# Patient Record
Sex: MALE | Race: WHITE | NOT HISPANIC OR LATINO | URBAN - METROPOLITAN AREA
[De-identification: names, ages, dates, MRNs, and addresses within clinical notes are randomized per-mention and may not be internally consistent; named-entity substitution may affect disease eponyms.]

---

## 2017-04-26 ENCOUNTER — OUTPATIENT (OUTPATIENT)
Dept: OUTPATIENT SERVICES | Facility: HOSPITAL | Age: 47
LOS: 1 days | Discharge: HOME | End: 2017-04-26

## 2017-06-28 DIAGNOSIS — B17.0: ICD-10-CM

## 2017-06-28 DIAGNOSIS — B20 HUMAN IMMUNODEFICIENCY VIRUS [HIV] DISEASE: ICD-10-CM

## 2017-10-25 ENCOUNTER — OUTPATIENT (OUTPATIENT)
Dept: OUTPATIENT SERVICES | Facility: HOSPITAL | Age: 47
LOS: 1 days | Discharge: HOME | End: 2017-10-25

## 2017-10-25 DIAGNOSIS — K76.6 PORTAL HYPERTENSION: ICD-10-CM

## 2017-10-25 DIAGNOSIS — F17.200 NICOTINE DEPENDENCE, UNSPECIFIED, UNCOMPLICATED: ICD-10-CM

## 2017-10-25 DIAGNOSIS — R76.11 NONSPECIFIC REACTION TO TUBERCULIN SKIN TEST WITHOUT ACTIVE TUBERCULOSIS: ICD-10-CM

## 2017-10-25 DIAGNOSIS — F10.20 ALCOHOL DEPENDENCE, UNCOMPLICATED: ICD-10-CM

## 2017-10-25 DIAGNOSIS — B19.20 UNSPECIFIED VIRAL HEPATITIS C WITHOUT HEPATIC COMA: ICD-10-CM

## 2017-10-25 DIAGNOSIS — F10.231 ALCOHOL DEPENDENCE WITH WITHDRAWAL DELIRIUM: ICD-10-CM

## 2017-10-25 DIAGNOSIS — K72.90 HEPATIC FAILURE, UNSPECIFIED WITHOUT COMA: ICD-10-CM

## 2017-10-25 DIAGNOSIS — Z21 ASYMPTOMATIC HUMAN IMMUNODEFICIENCY VIRUS [HIV] INFECTION STATUS: ICD-10-CM

## 2017-10-25 DIAGNOSIS — F11.20 OPIOID DEPENDENCE, UNCOMPLICATED: ICD-10-CM

## 2017-10-26 DIAGNOSIS — B20 HUMAN IMMUNODEFICIENCY VIRUS [HIV] DISEASE: ICD-10-CM

## 2017-12-04 PROBLEM — Z00.00 ENCOUNTER FOR PREVENTIVE HEALTH EXAMINATION: Status: ACTIVE | Noted: 2017-12-04

## 2017-12-18 ENCOUNTER — INPATIENT (INPATIENT)
Facility: HOSPITAL | Age: 47
LOS: 6 days | Discharge: HOME | End: 2017-12-25
Attending: SPECIALIST | Admitting: SPECIALIST

## 2017-12-18 DIAGNOSIS — B19.20 UNSPECIFIED VIRAL HEPATITIS C WITHOUT HEPATIC COMA: ICD-10-CM

## 2017-12-18 DIAGNOSIS — R76.11 NONSPECIFIC REACTION TO TUBERCULIN SKIN TEST WITHOUT ACTIVE TUBERCULOSIS: ICD-10-CM

## 2017-12-18 DIAGNOSIS — F11.20 OPIOID DEPENDENCE, UNCOMPLICATED: ICD-10-CM

## 2017-12-18 DIAGNOSIS — F10.20 ALCOHOL DEPENDENCE, UNCOMPLICATED: ICD-10-CM

## 2017-12-18 DIAGNOSIS — F10.231 ALCOHOL DEPENDENCE WITH WITHDRAWAL DELIRIUM: ICD-10-CM

## 2017-12-18 DIAGNOSIS — Z21 ASYMPTOMATIC HUMAN IMMUNODEFICIENCY VIRUS [HIV] INFECTION STATUS: ICD-10-CM

## 2017-12-18 DIAGNOSIS — K72.90 HEPATIC FAILURE, UNSPECIFIED WITHOUT COMA: ICD-10-CM

## 2017-12-18 DIAGNOSIS — F17.200 NICOTINE DEPENDENCE, UNSPECIFIED, UNCOMPLICATED: ICD-10-CM

## 2017-12-18 DIAGNOSIS — K76.6 PORTAL HYPERTENSION: ICD-10-CM

## 2017-12-28 ENCOUNTER — OUTPATIENT (OUTPATIENT)
Dept: OUTPATIENT SERVICES | Facility: HOSPITAL | Age: 47
LOS: 1 days | Discharge: HOME | End: 2017-12-28

## 2017-12-28 DIAGNOSIS — F17.200 NICOTINE DEPENDENCE, UNSPECIFIED, UNCOMPLICATED: ICD-10-CM

## 2017-12-28 DIAGNOSIS — Z21 ASYMPTOMATIC HUMAN IMMUNODEFICIENCY VIRUS [HIV] INFECTION STATUS: ICD-10-CM

## 2017-12-28 DIAGNOSIS — F10.231 ALCOHOL DEPENDENCE WITH WITHDRAWAL DELIRIUM: ICD-10-CM

## 2017-12-28 DIAGNOSIS — F10.20 ALCOHOL DEPENDENCE, UNCOMPLICATED: ICD-10-CM

## 2017-12-28 DIAGNOSIS — R76.11 NONSPECIFIC REACTION TO TUBERCULIN SKIN TEST WITHOUT ACTIVE TUBERCULOSIS: ICD-10-CM

## 2017-12-28 DIAGNOSIS — F11.20 OPIOID DEPENDENCE, UNCOMPLICATED: ICD-10-CM

## 2017-12-28 DIAGNOSIS — B19.20 UNSPECIFIED VIRAL HEPATITIS C WITHOUT HEPATIC COMA: ICD-10-CM

## 2017-12-28 DIAGNOSIS — B20 HUMAN IMMUNODEFICIENCY VIRUS [HIV] DISEASE: ICD-10-CM

## 2017-12-28 DIAGNOSIS — K76.6 PORTAL HYPERTENSION: ICD-10-CM

## 2017-12-28 DIAGNOSIS — K72.90 HEPATIC FAILURE, UNSPECIFIED WITHOUT COMA: ICD-10-CM

## 2017-12-29 ENCOUNTER — EMERGENCY (EMERGENCY)
Facility: HOSPITAL | Age: 47
LOS: 0 days | Discharge: LEFT BEFORE DISCHARGE | End: 2017-12-29
Admitting: SPECIALIST
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
## 2017-12-29 DIAGNOSIS — R06.02 SHORTNESS OF BREATH: ICD-10-CM

## 2017-12-29 DIAGNOSIS — F11.10 OPIOID ABUSE, UNCOMPLICATED: ICD-10-CM

## 2017-12-29 DIAGNOSIS — F10.231 ALCOHOL DEPENDENCE WITH WITHDRAWAL DELIRIUM: ICD-10-CM

## 2017-12-29 DIAGNOSIS — E87.2 ACIDOSIS: ICD-10-CM

## 2017-12-29 DIAGNOSIS — Z21 ASYMPTOMATIC HUMAN IMMUNODEFICIENCY VIRUS [HIV] INFECTION STATUS: ICD-10-CM

## 2017-12-29 DIAGNOSIS — B19.20 UNSPECIFIED VIRAL HEPATITIS C WITHOUT HEPATIC COMA: ICD-10-CM

## 2017-12-29 DIAGNOSIS — F11.23 OPIOID DEPENDENCE WITH WITHDRAWAL: ICD-10-CM

## 2017-12-29 DIAGNOSIS — R65.10 SYSTEMIC INFLAMMATORY RESPONSE SYNDROME (SIRS) OF NON-INFECTIOUS ORIGIN WITHOUT ACUTE ORGAN DYSFUNCTION: ICD-10-CM

## 2017-12-29 DIAGNOSIS — R76.11 NONSPECIFIC REACTION TO TUBERCULIN SKIN TEST WITHOUT ACTIVE TUBERCULOSIS: ICD-10-CM

## 2017-12-29 DIAGNOSIS — F11.20 OPIOID DEPENDENCE, UNCOMPLICATED: ICD-10-CM

## 2017-12-29 DIAGNOSIS — F10.20 ALCOHOL DEPENDENCE, UNCOMPLICATED: ICD-10-CM

## 2017-12-29 DIAGNOSIS — Z85.05 PERSONAL HISTORY OF MALIGNANT NEOPLASM OF LIVER: ICD-10-CM

## 2017-12-29 DIAGNOSIS — K72.90 HEPATIC FAILURE, UNSPECIFIED WITHOUT COMA: ICD-10-CM

## 2017-12-29 DIAGNOSIS — F17.200 NICOTINE DEPENDENCE, UNSPECIFIED, UNCOMPLICATED: ICD-10-CM

## 2017-12-29 DIAGNOSIS — G40.89 OTHER SEIZURES: ICD-10-CM

## 2017-12-29 DIAGNOSIS — J96.01 ACUTE RESPIRATORY FAILURE WITH HYPOXIA: ICD-10-CM

## 2017-12-29 DIAGNOSIS — R07.9 CHEST PAIN, UNSPECIFIED: ICD-10-CM

## 2017-12-29 DIAGNOSIS — N17.9 ACUTE KIDNEY FAILURE, UNSPECIFIED: ICD-10-CM

## 2017-12-29 DIAGNOSIS — G93.41 METABOLIC ENCEPHALOPATHY: ICD-10-CM

## 2017-12-29 DIAGNOSIS — F13.10 SEDATIVE, HYPNOTIC OR ANXIOLYTIC ABUSE, UNCOMPLICATED: ICD-10-CM

## 2017-12-29 DIAGNOSIS — Z79.899 OTHER LONG TERM (CURRENT) DRUG THERAPY: ICD-10-CM

## 2017-12-29 DIAGNOSIS — E83.42 HYPOMAGNESEMIA: ICD-10-CM

## 2017-12-29 DIAGNOSIS — E87.6 HYPOKALEMIA: ICD-10-CM

## 2017-12-29 DIAGNOSIS — Z87.891 PERSONAL HISTORY OF NICOTINE DEPENDENCE: ICD-10-CM

## 2017-12-29 DIAGNOSIS — Z90.49 ACQUIRED ABSENCE OF OTHER SPECIFIED PARTS OF DIGESTIVE TRACT: ICD-10-CM

## 2017-12-29 DIAGNOSIS — B20 HUMAN IMMUNODEFICIENCY VIRUS [HIV] DISEASE: ICD-10-CM

## 2017-12-29 DIAGNOSIS — K76.6 PORTAL HYPERTENSION: ICD-10-CM

## 2018-01-02 ENCOUNTER — INPATIENT (INPATIENT)
Facility: HOSPITAL | Age: 48
LOS: 14 days | Discharge: NURSING, REHAB OR LONG-TERM CARE | End: 2018-01-17
Attending: SPECIALIST | Admitting: SPECIALIST

## 2018-01-02 DIAGNOSIS — R76.11 NONSPECIFIC REACTION TO TUBERCULIN SKIN TEST WITHOUT ACTIVE TUBERCULOSIS: ICD-10-CM

## 2018-01-02 DIAGNOSIS — Z21 ASYMPTOMATIC HUMAN IMMUNODEFICIENCY VIRUS [HIV] INFECTION STATUS: ICD-10-CM

## 2018-01-02 DIAGNOSIS — F10.231 ALCOHOL DEPENDENCE WITH WITHDRAWAL DELIRIUM: ICD-10-CM

## 2018-01-02 DIAGNOSIS — F19.20 OTHER PSYCHOACTIVE SUBSTANCE DEPENDENCE, UNCOMPLICATED: ICD-10-CM

## 2018-01-02 DIAGNOSIS — R78.81 BACTEREMIA: ICD-10-CM

## 2018-01-02 DIAGNOSIS — B19.20 UNSPECIFIED VIRAL HEPATITIS C WITHOUT HEPATIC COMA: ICD-10-CM

## 2018-01-02 DIAGNOSIS — F10.20 ALCOHOL DEPENDENCE, UNCOMPLICATED: ICD-10-CM

## 2018-01-02 DIAGNOSIS — F11.20 OPIOID DEPENDENCE, UNCOMPLICATED: ICD-10-CM

## 2018-01-02 DIAGNOSIS — K76.6 PORTAL HYPERTENSION: ICD-10-CM

## 2018-01-04 DIAGNOSIS — T65.891A TOXIC EFFECT OF OTHER SPECIFIED SUBSTANCES, ACCIDENTAL (UNINTENTIONAL), INITIAL ENCOUNTER: ICD-10-CM

## 2018-01-04 DIAGNOSIS — G92 TOXIC ENCEPHALOPATHY: ICD-10-CM
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
## 2018-01-04 DIAGNOSIS — F10.239 ALCOHOL DEPENDENCE WITH WITHDRAWAL, UNSPECIFIED: ICD-10-CM

## 2018-01-04 DIAGNOSIS — T40.1X1A POISONING BY HEROIN, ACCIDENTAL (UNINTENTIONAL), INITIAL ENCOUNTER: ICD-10-CM

## 2018-01-20 ENCOUNTER — EMERGENCY (EMERGENCY)
Facility: HOSPITAL | Age: 48
LOS: 0 days | Discharge: NURSING, REHAB OR LONG-TERM CARE | End: 2018-01-20

## 2018-01-20 DIAGNOSIS — K76.6 PORTAL HYPERTENSION: ICD-10-CM
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
## 2018-01-20 DIAGNOSIS — F11.20 OPIOID DEPENDENCE, UNCOMPLICATED: ICD-10-CM

## 2018-01-20 DIAGNOSIS — Z21 ASYMPTOMATIC HUMAN IMMUNODEFICIENCY VIRUS [HIV] INFECTION STATUS: ICD-10-CM
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
## 2018-01-20 DIAGNOSIS — F19.20 OTHER PSYCHOACTIVE SUBSTANCE DEPENDENCE, UNCOMPLICATED: ICD-10-CM

## 2018-01-20 DIAGNOSIS — T82.898A OTHER SPECIFIED COMPLICATION OF VASCULAR PROSTHETIC DEVICES, IMPLANTS AND GRAFTS, INITIAL ENCOUNTER: ICD-10-CM

## 2018-01-20 DIAGNOSIS — B19.20 UNSPECIFIED VIRAL HEPATITIS C WITHOUT HEPATIC COMA: ICD-10-CM

## 2018-01-20 DIAGNOSIS — F10.20 ALCOHOL DEPENDENCE, UNCOMPLICATED: ICD-10-CM

## 2018-01-20 DIAGNOSIS — F10.231 ALCOHOL DEPENDENCE WITH WITHDRAWAL DELIRIUM: ICD-10-CM
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
## 2018-01-20 DIAGNOSIS — R78.81 BACTEREMIA: ICD-10-CM

## 2018-01-20 DIAGNOSIS — R76.11 NONSPECIFIC REACTION TO TUBERCULIN SKIN TEST WITHOUT ACTIVE TUBERCULOSIS: ICD-10-CM
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
## 2018-01-24 DIAGNOSIS — B96.89 OTHER SPECIFIED BACTERIAL AGENTS AS THE CAUSE OF DISEASES CLASSIFIED ELSEWHERE: ICD-10-CM

## 2018-01-24 DIAGNOSIS — B20 HUMAN IMMUNODEFICIENCY VIRUS [HIV] DISEASE: ICD-10-CM

## 2018-01-24 DIAGNOSIS — F11.10 OPIOID ABUSE, UNCOMPLICATED: ICD-10-CM

## 2018-01-24 DIAGNOSIS — R78.81 BACTEREMIA: ICD-10-CM

## 2018-01-24 DIAGNOSIS — I38 ENDOCARDITIS, VALVE UNSPECIFIED: ICD-10-CM

## 2018-01-24 DIAGNOSIS — F17.210 NICOTINE DEPENDENCE, CIGARETTES, UNCOMPLICATED: ICD-10-CM

## 2018-01-24 DIAGNOSIS — F10.10 ALCOHOL ABUSE, UNCOMPLICATED: ICD-10-CM

## 2018-01-24 DIAGNOSIS — K70.30 ALCOHOLIC CIRRHOSIS OF LIVER WITHOUT ASCITES: ICD-10-CM

## 2018-01-24 DIAGNOSIS — C22.0 LIVER CELL CARCINOMA: ICD-10-CM

## 2018-01-31 ENCOUNTER — INPATIENT (INPATIENT)
Facility: HOSPITAL | Age: 48
LOS: 2 days | Discharge: HOME | End: 2018-02-03
Attending: INTERNAL MEDICINE | Admitting: SPECIALIST

## 2018-02-07 DIAGNOSIS — F41.9 ANXIETY DISORDER, UNSPECIFIED: ICD-10-CM

## 2018-02-07 DIAGNOSIS — F11.20 OPIOID DEPENDENCE, UNCOMPLICATED: ICD-10-CM
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
## 2018-02-07 DIAGNOSIS — Z85.05 PERSONAL HISTORY OF MALIGNANT NEOPLASM OF LIVER: ICD-10-CM

## 2018-02-07 DIAGNOSIS — F10.20 ALCOHOL DEPENDENCE, UNCOMPLICATED: ICD-10-CM

## 2018-02-07 DIAGNOSIS — F17.210 NICOTINE DEPENDENCE, CIGARETTES, UNCOMPLICATED: ICD-10-CM

## 2018-02-15 ENCOUNTER — OUTPATIENT (OUTPATIENT)
Dept: OUTPATIENT SERVICES | Facility: HOSPITAL | Age: 48
LOS: 1 days | Discharge: HOME | End: 2018-02-15

## 2018-02-15 DIAGNOSIS — Z21 ASYMPTOMATIC HUMAN IMMUNODEFICIENCY VIRUS [HIV] INFECTION STATUS: ICD-10-CM

## 2018-02-16 ENCOUNTER — HOSPITAL ENCOUNTER (INPATIENT)
Dept: HOSPITAL 74 - YASAS | Age: 48
LOS: 4 days | Discharge: HOME | DRG: 773 | End: 2018-02-20
Attending: INTERNAL MEDICINE | Admitting: INTERNAL MEDICINE
Payer: COMMERCIAL

## 2018-02-16 VITALS — BODY MASS INDEX: 25.2 KG/M2

## 2018-02-16 DIAGNOSIS — F11.23: Primary | ICD-10-CM

## 2018-02-16 DIAGNOSIS — B20 HUMAN IMMUNODEFICIENCY VIRUS [HIV] DISEASE: ICD-10-CM

## 2018-02-16 DIAGNOSIS — Z21: ICD-10-CM

## 2018-02-16 DIAGNOSIS — F10.230: ICD-10-CM

## 2018-02-16 DIAGNOSIS — Z86.19: ICD-10-CM

## 2018-02-16 LAB
ALBUMIN SERPL-MCNC: 4.1 G/DL (ref 3.4–5)
ALP SERPL-CCNC: 230 U/L (ref 45–117)
ALT SERPL-CCNC: 24 U/L (ref 12–78)
ANION GAP SERPL CALC-SCNC: 10 MMOL/L (ref 8–16)
APPEARANCE UR: CLEAR
AST SERPL-CCNC: 41 U/L (ref 15–37)
BILIRUB SERPL-MCNC: 0.3 MG/DL (ref 0.2–1)
BILIRUB UR STRIP.AUTO-MCNC: NEGATIVE MG/DL
BUN SERPL-MCNC: 5 MG/DL (ref 7–18)
CALCIUM SERPL-MCNC: 8.5 MG/DL (ref 8.5–10.1)
CHLORIDE SERPL-SCNC: 100 MMOL/L (ref 98–107)
CO2 SERPL-SCNC: 27 MMOL/L (ref 21–32)
COLOR UR: (no result)
CREAT SERPL-MCNC: 0.8 MG/DL (ref 0.7–1.3)
DEPRECATED RDW RBC AUTO: 15.4 % (ref 11.9–15.9)
GLUCOSE SERPL-MCNC: 86 MG/DL (ref 74–106)
HCT VFR BLD CALC: 39.5 % (ref 35.4–49)
HGB BLD-MCNC: 13 GM/DL (ref 11.7–16.9)
KETONES UR QL STRIP: NEGATIVE
LEUKOCYTE ESTERASE UR QL STRIP.AUTO: NEGATIVE
MCH RBC QN AUTO: 29.4 PG (ref 25.7–33.7)
MCHC RBC AUTO-ENTMCNC: 33 G/DL (ref 32–35.9)
MCV RBC: 89.1 FL (ref 80–96)
NITRITE UR QL STRIP: NEGATIVE
PH UR: 5 [PH] (ref 5–8)
PLATELET # BLD AUTO: 136 K/MM3 (ref 134–434)
PMV BLD: 9.1 FL (ref 7.5–11.1)
POTASSIUM SERPLBLD-SCNC: 3.9 MMOL/L (ref 3.5–5.1)
PROT SERPL-MCNC: 8.3 G/DL (ref 6.4–8.2)
PROT UR QL STRIP: NEGATIVE
PROT UR QL STRIP: NEGATIVE
RBC # BLD AUTO: 4.44 M/MM3 (ref 4–5.6)
RBC # UR STRIP: NEGATIVE /UL
SODIUM SERPL-SCNC: 137 MMOL/L (ref 136–145)
SP GR UR: 1 (ref 1–1.03)
UROBILINOGEN UR STRIP-MCNC: NEGATIVE MG/DL (ref 0.2–1)
WBC # BLD AUTO: 5.2 K/MM3 (ref 4–10)

## 2018-02-16 PROCEDURE — HZ2ZZZZ DETOXIFICATION SERVICES FOR SUBSTANCE ABUSE TREATMENT: ICD-10-PCS | Performed by: INTERNAL MEDICINE

## 2018-02-16 RX ADMIN — DARUNAVIR SCH MG: 600 TABLET, FILM COATED ORAL at 22:22

## 2018-02-16 RX ADMIN — ETRAVIRINE SCH MG: 200 TABLET ORAL at 22:22

## 2018-02-16 RX ADMIN — RITONAVIR SCH MG: 100 TABLET, FILM COATED ORAL at 22:22

## 2018-02-16 RX ADMIN — NICOTINE SCH MG: 21 PATCH TRANSDERMAL at 15:07

## 2018-02-16 RX ADMIN — RALTEGRAVIR SCH MG: 400 TABLET, FILM COATED ORAL at 22:22

## 2018-02-16 RX ADMIN — Medication SCH: at 22:35

## 2018-02-16 NOTE — HP
COWS





- Scale


Resting Pulse: 2= -120


Sweatin= Chills/Flushing


Restless Observation: 3= Extraneous Movement


Pupil Size: 2= Moderately Dilated


Bone or Joint Aches: 4=Acute Joint/Muscle Pain


Runny Nose/ Eye Tearin= Nasal Congestion


GI Upset > 30mins: 1= Stomach Cramp


Tremor Observation: 2= Slight Tremor Visible


Yawning Observation: 1= 1-2x During Session


Anxiety or Irritability: 2=Irritable/Anxious


Goose Flesh Skin: 0=Smooth Skin


COWS Score: 19





CIWA Score





- CIWA Score


Nausea/Vomitin-No Nausea/No Vomiting


Muscle Tremors: 4-Moderate,w/Arms Extend


Anxiety: 4-Mod. Anxious/Guarded


Agitation: 4-Moderately Restless


Paroxysmal Sweats: 1-Minimal Palms Moist


Orientation: 0-Oriented


Tacttile Disturbances: 3-Moderate Itch/Numb/Burn


Auditory Disturbances: 0-None


Visual Disturbances: 0-None


Headache: 0-None Present


CIWA-Ar Total Score: 16





Admission ROS S





- HPI


Chief Complaint: 





WITHDRAWAL SX FROM HEROIN AND ALCOHOL


Allergies/Adverse Reactions: 


 Allergies











Allergy/AdvReac Type Severity Reaction Status Date / Time


 


No Known Allergies Allergy   Verified 18 12:11











History of Present Illness: 





48 Y/O  MALE WITH A HX OF HEROIN AND ALCOHOL DEPENDENCE SEEKING DETOX 

[place] 


Exam Limitations: No Limitations





- Ebola screening


Have you traveled outside of the country in the last 21 days: No (N)


Have you had contact with anyone from an Ebola affected area: No


Have you been sick,other than usual withdrawal symptoms: No


Do you have a fever: No





- Review of Systems


Constitutional: Chills, Loss of Appetite, Night Sweats, Changes in sleep, 

Unintentional Wgt. Loss


EENT: reports: Tearing, Nose Congestion


Respiratory: reports: No Symptoms reported


Cardiac: reports: No Symptoms Reported


GI: reports: Diarrhea, Nausea, Poor Appetite, Vomiting


: reports: No Symptoms Reported


Musculoskeletal: reports: Joint Pain (LEFT SHOULDER WRESTLING INJURY), Muscle 

Pain


Integumentary: reports: Bruising (IVD INJ SITES), Other (TATTOOS ON HANDS)


Neuro: reports: Tremors


Endocrine: reports: No Symptoms Reported


Hematology: reports: No Symptoms Reported


Psychiatric: reports: Orientated x3


Other Systems: Reviewed and Negative





Patient History





- Patient Medical History


Hx Anemia: No


Hx Asthma: No


Hx Chronic Obstructive Pulmonary Disease (COPD): No


Hx Cardiac Disorders: No


Hx Hypertension: No


HX Cerebrovascular Accident: No


Hx Seizures: No


Hx Diabetes: No


Hx Gastrointestinal Disorders: No


Hx Genitourinary Disorders: No


Hx Sexually Transmitted Disorders: No


Hx Renal Disease (ESRD): No


Hx Thyroid Disease: No


Hx Human Immunodeficiency Virus (HIV): Yes (SINCE ; ON MEDS)


Hx Hepatitis C: Yes (TREATED WITH HARVONI-RESOLVE)


Hx Depression: No


Hx Suicide Attempt: No


Hx Schizophrenia: No





- Patient Surgical History


Past Surgical History: Yes


Hx Neurologic Surgery: No


Hx Cataract Extraction: No


Hx Cardiac Surgery: No


Hx Lung Surgery: No


Hx Breast Surgery: No


Hx Breast Biopsy: No


Hx Abdominal Surgery: No


Hx Appendectomy: No


Hx Cholecystectomy: Yes (in )


Hx Genitourinary Surgery: No


Hx Orthopedic Surgery: No


Anesthesia Reaction: No





- PPD History


Previous Implant?: Yes


Documented Results: Negative w/o proof


Implanted On Prior R Admission?: No


PPD to be Administered?: Yes





- Reproductive History


Patient is a Female of Child Bearing Age (11 -55 yrs old): No (MALE)





- Smoking Cessation


Smoking history: Current every day smoker


Have you smoked in the past 12 months: Yes


Aproximately how many cigarettes per day: 20


Hx Chewing Tobacco Use: No


Initiated information on smoking cessation: Yes


'Breaking Loose' booklet given: 18





- Substance & Tx. History


Hx Alcohol Use: Yes (BEER/VODKA)


Hx Substance Use: Yes (HEROIN)


Substance Use Type: Alcohol, Heroin





- Substances Abused


  ** Heroin


Route: Injection


Frequency: Daily


Amount used: 6-8 bags


Age of first use: 13


Date of Last Use: 18





  ** Alcohol-beer/vodka


Route: Oral


Frequency: Daily


Amount used: 3-6 pks./1 pt.


Age of first use: 13


Date of Last Use: 18





Admission Physical Exam [facility]





- Vital Signs


Vital Signs: 


 Vital Signs - 24 hr











  18





  11:14


 


Temperature 97.1 F L


 


Pulse Rate 105 H


 


Respiratory 20





Rate 


 


Blood Pressure 128/78














- Physical


General Appearance: Yes: Moderate Distress, Alcohol on Breath, Intoxicated, 

Irritable, Anxious, Other (RESTLESS)


HEENTM: Yes: EOMI, Normocephalic, ANTOINE, Pharynx Normal, Nasal Congestion


Respiratory: Yes: Chest Non-Tender, Lungs Clear, Normal Breath Sounds, No 

Respiratory Distress


Neck: Yes: No masses,lesions,Nodules, Supple, Trachea in good position


Breast: Yes: Breast Exam Deferred


Cardiology: Yes: Regular Rhythm, Regular Rate, S1, S2


Abdominal: Yes: Normal Bowel Sounds, Non Tender, Flat, Soft


Genitourinary: Yes: Other (N/C)


Back: Yes: Within Normal Limits


Musculoskeletal: Yes: full range of Motion, Gait Steady


Extremities: Yes: Normal Range of Motion, Non-Tender


Neurological: Yes: CNs II-XII NML intact, Fully Oriented, Alert, Motor Strength 

5/5


Integumentary: Yes: Dry, Warm, Track Marks (BOTH FOREARMS. NO REDNESS OR 

SWELLING)


Lymphatic: Yes: Within Normal Limits





- Diagnostic


(1) Alcohol dependence with uncomplicated withdrawal


Current Visit: Yes   Status: Acute   





(2) Opioid dependence with withdrawal


Current Visit: Yes   Status: Acute   





(3) HIV (human immunodeficiency virus infection)


Current Visit: Yes   Status: Chronic   





Cleared for Admission [facility]





- Detox or Rehab


[facility] Level of Care: Medically Managed


Detox Regimen/Protocol: Methadone/Librium





[facility] Breath Alcohol Content


Breath Alcohol Content: 0.254





Urine Drug Screen





- Results


Drug Screen Negative: No


Urine Drug Screen Results: OPI-Opiates

## 2018-02-17 RX ADMIN — Medication SCH MG: at 22:33

## 2018-02-17 RX ADMIN — DARUNAVIR SCH MG: 600 TABLET, FILM COATED ORAL at 22:34

## 2018-02-17 RX ADMIN — RITONAVIR SCH MG: 100 TABLET, FILM COATED ORAL at 22:34

## 2018-02-17 RX ADMIN — RITONAVIR SCH: 100 TABLET, FILM COATED ORAL at 11:45

## 2018-02-17 RX ADMIN — NICOTINE SCH MG: 21 PATCH TRANSDERMAL at 10:53

## 2018-02-17 RX ADMIN — RALTEGRAVIR SCH: 400 TABLET, FILM COATED ORAL at 11:45

## 2018-02-17 RX ADMIN — ETRAVIRINE SCH MG: 200 TABLET ORAL at 22:34

## 2018-02-17 RX ADMIN — DARUNAVIR SCH: 600 TABLET, FILM COATED ORAL at 11:45

## 2018-02-17 RX ADMIN — ETRAVIRINE SCH: 200 TABLET ORAL at 11:45

## 2018-02-17 RX ADMIN — RALTEGRAVIR SCH MG: 400 TABLET, FILM COATED ORAL at 22:34

## 2018-02-17 RX ADMIN — Medication SCH TAB: at 10:52

## 2018-02-17 NOTE — EKG
Test Reason : 

Blood Pressure : ***/*** mmHG

Vent. Rate : 089 BPM     Atrial Rate : 089 BPM

   P-R Int : 142 ms          QRS Dur : 078 ms

    QT Int : 374 ms       P-R-T Axes : 008 032 039 degrees

   QTc Int : 455 ms

 

NORMAL SINUS RHYTHM

NORMAL ECG

NO PREVIOUS ECGS AVAILABLE

Confirmed by IRINA HOOK MD ([id number]) on 2/17/2018 12:20:57 PM

 

Referred By:             Confirmed By:IRINA HOOK MD

## 2018-02-17 NOTE — PN
S CIWA





- CIWA Score


Nausea/Vomiting: 3


Muscle Tremors: 3


Anxiety: 3


Agitation: 3


Paroxysmal Sweats: 2


Orientation: 0-Oriented


Tacttile Disturbances: 1-Very Mild Itch/Numbness


Auditory Disturbances: 1-Very Mild


Visual Disturbances: 0-None


Headache: 2-Mild


CIWA-Ar Total Score: 18





[facility] Progress Note (SOAP)


Subjective: 





ALERT,IRRITABLE,ANXIOUS,INTERRUPTED SLEEP,PAIN IN THE BODY AND BACK,TREMOR


Objective: 





02/17/18 11:32


 Vital Signs











Temperature  97.0 F L  02/17/18 09:53


 


Pulse Rate  97 H  02/17/18 09:53


 


Respiratory Rate  18   02/17/18 09:53


 


Blood Pressure  128/94   02/17/18 09:53


 


O2 Sat by Pulse Oximetry (%)      








EKG NSR,NORMAL ECG


 Laboratory Last Values











WBC  5.2 K/mm3 (4.0-10.0)   02/16/18  14:15    


 


RBC  4.44 M/mm3 (4.00-5.60)   02/16/18  14:15    


 


Hgb  13.0 GM/dL (11.7-16.9)   02/16/18  14:15    


 


Hct  39.5 % (35.4-49)   02/16/18  14:15    


 


MCV  89.1 fl (80-96)   02/16/18  14:15    


 


MCH  29.4 pg (25.7-33.7)   02/16/18  14:15    


 


MCHC  33.0 g/dl (32.0-35.9)   02/16/18  14:15    


 


RDW  15.4 % (11.9-15.9)   02/16/18  14:15    


 


Plt Count  136 K/MM3 (134-434)   02/16/18  14:15    


 


MPV  9.1 fl (7.5-11.1)   02/16/18  14:15    


 


Sodium  137 mmol/L (136-145)   02/16/18  14:15    


 


Potassium  3.9 mmol/L (3.5-5.1)   02/16/18  14:15    


 


Chloride  100 mmol/L ()   02/16/18  14:15    


 


Carbon Dioxide  27 mmol/L (21-32)   02/16/18  14:15    


 


Anion Gap  10  (8-16)   02/16/18  14:15    


 


BUN  5 mg/dL (7-18)  L  02/16/18  14:15    


 


Creatinine  0.8 mg/dL (0.7-1.3)   02/16/18  14:15    


 


Creat Clearance w eGFR  > 60  (>60)   02/16/18  14:15    


 


Random Glucose  86 mg/dL ()   02/16/18  14:15    


 


Calcium  8.5 mg/dL (8.5-10.1)   02/16/18  14:15    


 


Total Bilirubin  0.3 mg/dL (0.2-1.0)   02/16/18  14:15    


 


AST  41 U/L (15-37)  H  02/16/18  14:15    


 


ALT  24 U/L (12-78)   02/16/18  14:15    


 


Alkaline Phosphatase  230 U/L ()  H  02/16/18  14:15    


 


Total Protein  8.3 g/dl (6.4-8.2)  H  02/16/18  14:15    


 


Albumin  4.1 g/dl (3.4-5.0)   02/16/18  14:15    


 


Urine Color  Straw   02/16/18  15:00    


 


Urine Appearance  Clear   02/16/18  15:00    


 


Urine pH  5.0  (5.0-8.0)   02/16/18  15:00    


 


Ur Specific Gravity  1.005  (1.001-1.035)   02/16/18  15:00    


 


Urine Protein  Negative  (NEGATIVE)   02/16/18  15:00    


 


Urine Glucose (UA)  Negative  (NEGATIVE)   02/16/18  15:00    


 


Urine Ketones  Negative  (NEGATIVE)   02/16/18  15:00    


 


Urine Blood  Negative  (NEGATIVE)   02/16/18  15:00    


 


Urine Nitrite  Negative  (NEGATIVE)   02/16/18  15:00    


 


Urine Bilirubin  Negative  (NEGATIVE)   02/16/18  15:00    


 


Urine Urobilinogen  Negative mg/dL (0.2-1.0)   02/16/18  15:00    


 


Ur Leukocyte Esterase  Negative  (NEGATIVE)   02/16/18  15:00    


 


RPR Titer  Nonreactive  (NONREACTIVE)   02/16/18  14:15    











Assessment: 





02/17/18 11:33


WITHDRAWAL SYMPTOM


Plan: 





CONTINUE DETOX

## 2018-02-18 RX ADMIN — Medication SCH MG: at 22:53

## 2018-02-18 RX ADMIN — RITONAVIR SCH MG: 100 TABLET, FILM COATED ORAL at 22:53

## 2018-02-18 RX ADMIN — ETRAVIRINE SCH MG: 200 TABLET ORAL at 22:53

## 2018-02-18 RX ADMIN — DARUNAVIR SCH MG: 600 TABLET, FILM COATED ORAL at 22:53

## 2018-02-18 RX ADMIN — METHADONE HYDROCHLORIDE SCH MG: 5 TABLET ORAL at 10:39

## 2018-02-18 RX ADMIN — ETRAVIRINE SCH: 200 TABLET ORAL at 10:39

## 2018-02-18 RX ADMIN — HYDROXYZINE PAMOATE PRN MG: 50 CAPSULE ORAL at 22:56

## 2018-02-18 RX ADMIN — RALTEGRAVIR SCH: 400 TABLET, FILM COATED ORAL at 10:39

## 2018-02-18 RX ADMIN — HYDROXYZINE PAMOATE PRN MG: 50 CAPSULE ORAL at 10:38

## 2018-02-18 RX ADMIN — DARUNAVIR SCH: 600 TABLET, FILM COATED ORAL at 10:40

## 2018-02-18 RX ADMIN — NICOTINE SCH MG: 21 PATCH TRANSDERMAL at 10:39

## 2018-02-18 RX ADMIN — RITONAVIR SCH: 100 TABLET, FILM COATED ORAL at 10:40

## 2018-02-18 RX ADMIN — RALTEGRAVIR SCH MG: 400 TABLET, FILM COATED ORAL at 22:55

## 2018-02-18 RX ADMIN — Medication SCH: at 10:40

## 2018-02-18 NOTE — PN
[facility] CIWA





- CIWA Score


Nausea/Vomitin-Mild Nausea/No Vomiting


Muscle Tremors: 4-Moderate,w/Arms Extend


Anxiety: 3


Agitation: 3


Paroxysmal Sweats: 1-Minimal Palms Moist


Orientation: 1-Uncertain about Date


Tacttile Disturbances: 0-None


Auditory Disturbances: 0-None


Visual Disturbances: 0-None


Headache: 1-Very Mild


CIWA-Ar Total Score: 14





BHS COWS





- Scale


Resting Pulse: 1= PA 


Sweatin= Chills/Flushing


Restless Observation: 1= Difficult to Sit Still


Pupil Size: 1= Pupils >than Normal


Bone or Joint Aches: 1= Mild Discomfort


Runny Nose/ Eye Tearin= Nasal Congestion


GI Upset > 30mins: 1= Stomach Cramp


Tremor Observation of Outstretched Hands: 2= Slight Tremor Visible


Yawning Observation: 0= None


Anxiety or Irritability: 2=Irritable/Anxious


Goose Flesh Skin: 3=Piloerection


COWS Score: 14





BHS Progress Note (SOAP)


Subjective: 





joint aches


tremor


sweat


anxiety


irritable


stuffy running nose


Objective: 





18 11:57


 Vital Signs











Temperature  98.1 F   18 11:17


 


Pulse Rate  95 H  18 11:17


 


Respiratory Rate  18   18 11:17


 


Blood Pressure  110/91   18 11:17


 


O2 Sat by Pulse Oximetry (%)      








 Laboratory Last Values











WBC  5.2 K/mm3 (4.0-10.0)   18  14:15    


 


RBC  4.44 M/mm3 (4.00-5.60)   18  14:15    


 


Hgb  13.0 GM/dL (11.7-16.9)   18  14:15    


 


Hct  39.5 % (35.4-49)   18  14:15    


 


MCV  89.1 fl (80-96)   18  14:15    


 


MCH  29.4 pg (25.7-33.7)   18  14:15    


 


MCHC  33.0 g/dl (32.0-35.9)   18  14:15    


 


RDW  15.4 % (11.9-15.9)   18  14:15    


 


Plt Count  136 K/MM3 (134-434)   18  14:15    


 


MPV  9.1 fl (7.5-11.1)   18  14:15    


 


Sodium  137 mmol/L (136-145)   18  14:15    


 


Potassium  3.9 mmol/L (3.5-5.1)   18  14:15    


 


Chloride  100 mmol/L ()   18  14:15    


 


Carbon Dioxide  27 mmol/L (21-32)   18  14:15    


 


Anion Gap  10  (8-16)   18  14:15    


 


BUN  5 mg/dL (7-18)  L  18  14:15    


 


Creatinine  0.8 mg/dL (0.7-1.3)   18  14:15    


 


Creat Clearance w eGFR  > 60  (>60)   18  14:15    


 


Random Glucose  86 mg/dL ()   18  14:15    


 


Calcium  8.5 mg/dL (8.5-10.1)   18  14:15    


 


Total Bilirubin  0.3 mg/dL (0.2-1.0)   18  14:15    


 


AST  41 U/L (15-37)  H  18  14:15    


 


ALT  24 U/L (12-78)   18  14:15    


 


Alkaline Phosphatase  230 U/L ()  H  18  14:15    


 


Total Protein  8.3 g/dl (6.4-8.2)  H  18  14:15    


 


Albumin  4.1 g/dl (3.4-5.0)   18  14:15    


 


Urine Color  Straw   18  15:00    


 


Urine Appearance  Clear   18  15:00    


 


Urine pH  5.0  (5.0-8.0)   18  15:00    


 


Ur Specific Gravity  1.005  (1.001-1.035)   18  15:00    


 


Urine Protein  Negative  (NEGATIVE)   18  15:00    


 


Urine Glucose (UA)  Negative  (NEGATIVE)   18  15:00    


 


Urine Ketones  Negative  (NEGATIVE)   18  15:00    


 


Urine Blood  Negative  (NEGATIVE)   18  15:00    


 


Urine Nitrite  Negative  (NEGATIVE)   18  15:00    


 


Urine Bilirubin  Negative  (NEGATIVE)   18  15:00    


 


Urine Urobilinogen  Negative mg/dL (0.2-1.0)   18  15:00    


 


Ur Leukocyte Esterase  Negative  (NEGATIVE)   18  15:00    


 


RPR Titer  Nonreactive  (NONREACTIVE)   18  14:15    








lab noted


Assessment: 





18 11:58


withdrawal sx


Plan: 





continue detox

## 2018-02-19 RX ADMIN — Medication SCH: at 10:39

## 2018-02-19 RX ADMIN — DARUNAVIR SCH: 600 TABLET, FILM COATED ORAL at 10:39

## 2018-02-19 RX ADMIN — HYDROXYZINE PAMOATE PRN MG: 50 CAPSULE ORAL at 22:25

## 2018-02-19 RX ADMIN — Medication SCH MG: at 22:22

## 2018-02-19 RX ADMIN — ETRAVIRINE SCH: 200 TABLET ORAL at 10:39

## 2018-02-19 RX ADMIN — DARUNAVIR SCH MG: 600 TABLET, FILM COATED ORAL at 22:22

## 2018-02-19 RX ADMIN — RALTEGRAVIR SCH: 400 TABLET, FILM COATED ORAL at 10:39

## 2018-02-19 RX ADMIN — RITONAVIR SCH: 100 TABLET, FILM COATED ORAL at 10:39

## 2018-02-19 RX ADMIN — METHADONE HYDROCHLORIDE SCH MG: 5 TABLET ORAL at 10:38

## 2018-02-19 RX ADMIN — RITONAVIR SCH MG: 100 TABLET, FILM COATED ORAL at 22:22

## 2018-02-19 RX ADMIN — RALTEGRAVIR SCH MG: 400 TABLET, FILM COATED ORAL at 22:23

## 2018-02-19 RX ADMIN — ETRAVIRINE SCH MG: 200 TABLET ORAL at 22:23

## 2018-02-19 RX ADMIN — NICOTINE SCH MG: 21 PATCH TRANSDERMAL at 10:39

## 2018-02-19 NOTE — PN
[facility] Progress Note (SOAP)


Subjective: 





ALERT,IRRITABLE,ANXIOUS,INTERRUPTED SLEEP,PAIN IN THE BODY AND BACK


Objective: 





02/19/18 10:22


 Vital Signs











Temperature  98.0 F   02/19/18 06:00


 


Pulse Rate  80   02/19/18 06:00


 


Respiratory Rate  18   02/19/18 06:00


 


Blood Pressure  115/62   02/19/18 06:00


 


O2 Sat by Pulse Oximetry (%)      











Assessment: 





02/19/18 10:23


WITHDRAWAL SYMPTOM


Plan: 





CONTINUE DETOX

## 2018-02-20 VITALS — TEMPERATURE: 97.7 F | SYSTOLIC BLOOD PRESSURE: 108 MMHG | HEART RATE: 87 BPM | DIASTOLIC BLOOD PRESSURE: 71 MMHG

## 2018-02-20 RX ADMIN — ETRAVIRINE SCH: 200 TABLET ORAL at 09:40

## 2018-02-20 RX ADMIN — Medication SCH: at 09:42

## 2018-02-20 RX ADMIN — RITONAVIR SCH: 100 TABLET, FILM COATED ORAL at 09:40

## 2018-02-20 RX ADMIN — NICOTINE SCH: 21 PATCH TRANSDERMAL at 09:40

## 2018-02-20 RX ADMIN — RALTEGRAVIR SCH: 400 TABLET, FILM COATED ORAL at 09:42

## 2018-02-20 RX ADMIN — DARUNAVIR SCH: 600 TABLET, FILM COATED ORAL at 09:40

## 2018-02-20 NOTE — DS
[facility] Detox Discharge Summary


Admission Date: 


02/16/18





Discharge Date: 02/20/18





- History


Present History: Alcohol Dependence, Opioid Dependence


Additional Comments: 





FOLLOW UP WITH AFTER CARE PROGRAM AS ARRANGEMENT


Pertinent Past History: 





HIV





- Physical Exam Results


Vital Signs: 


 Vital Signs











Temperature  97.9 F   02/20/18 06:00


 


Pulse Rate  79   02/20/18 06:00


 


Respiratory Rate  18   02/20/18 06:00


 


Blood Pressure  108/66   02/20/18 06:00


 


O2 Sat by Pulse Oximetry (%)      











Pertinent Admission Physical Exam Findings: 





WITHDRAWAL SIGN AND SYMPTOM





- Treatment


Hospital Course: Detox Protocol Followed, Detoxed Safely, Responded well, 

Discharged Condition Good


Patient has Accepted a Rehab Referral to: [facility]





- Medication


Discharge Medications: 


Ambulatory Orders





Darunavir Ethanolate [Prezista -] 600 mg PO BID 02/16/18 


Etravirine [Intelence -] 200 mg PO BID 02/16/18 


Raltegravir [Isentress -] 400 mg PO BID 02/16/18 


Ritonavir [Norvir -] 100 mg PO BID 02/16/18 











- Diagnosis


(1) Opioid dependence with withdrawal


Current Visit: Yes   Status: Acute   





(2) Alcohol dependence with uncomplicated withdrawal


Current Visit: Yes   Status: Acute   





(3) HIV (human immunodeficiency virus infection)


Current Visit: Yes   Status: Chronic   





- AMA


Did Patient Leave Against Medical Advice: No

## 2018-02-20 NOTE — PN
[facility] Progress Note


Note: 





ALERT,NO COMPLAINT,NO WITHDRAWAL SYMPTOM,WOULD LIKE TO BE DISCHARGED TODAY,


PATIENT IS STABLE FOR DISCHARGE,FOLLOW UP WITH AFTER CARE PROGRAM AS ARRANGEMENT

## 2018-03-07 ENCOUNTER — APPOINTMENT (OUTPATIENT)
Dept: UROLOGY | Facility: CLINIC | Age: 48
End: 2018-03-07

## 2018-03-12 ENCOUNTER — TRANSCRIPTION ENCOUNTER (OUTPATIENT)
Age: 48
End: 2018-03-12

## 2018-03-28 ENCOUNTER — OUTPATIENT (OUTPATIENT)
Dept: OUTPATIENT SERVICES | Facility: HOSPITAL | Age: 48
LOS: 1 days | Discharge: HOME | End: 2018-03-28

## 2018-03-28 DIAGNOSIS — B20 HUMAN IMMUNODEFICIENCY VIRUS [HIV] DISEASE: ICD-10-CM

## 2018-04-10 ENCOUNTER — INPATIENT (INPATIENT)
Facility: HOSPITAL | Age: 48
LOS: 0 days | Discharge: LEFT BEFORE DISCHARGE | End: 2018-04-11
Attending: INTERNAL MEDICINE

## 2018-04-10 VITALS
HEIGHT: 68 IN | RESPIRATION RATE: 16 BRPM | WEIGHT: 160.06 LBS | SYSTOLIC BLOOD PRESSURE: 124 MMHG | DIASTOLIC BLOOD PRESSURE: 85 MMHG | TEMPERATURE: 99 F

## 2018-04-10 DIAGNOSIS — B20 HUMAN IMMUNODEFICIENCY VIRUS [HIV] DISEASE: ICD-10-CM

## 2018-04-10 DIAGNOSIS — F17.200 NICOTINE DEPENDENCE, UNSPECIFIED, UNCOMPLICATED: ICD-10-CM

## 2018-04-10 DIAGNOSIS — B19.20 UNSPECIFIED VIRAL HEPATITIS C WITHOUT HEPATIC COMA: ICD-10-CM

## 2018-04-10 DIAGNOSIS — F10.20 ALCOHOL DEPENDENCE, UNCOMPLICATED: ICD-10-CM

## 2018-04-10 DIAGNOSIS — F11.20 OPIOID DEPENDENCE, UNCOMPLICATED: ICD-10-CM

## 2018-04-10 DIAGNOSIS — R76.11 NONSPECIFIC REACTION TO TUBERCULIN SKIN TEST WITHOUT ACTIVE TUBERCULOSIS: ICD-10-CM

## 2018-04-10 LAB
APPEARANCE UR: CLEAR — SIGNIFICANT CHANGE UP
BILIRUB UR-MCNC: NEGATIVE — SIGNIFICANT CHANGE UP
COLOR SPEC: YELLOW — SIGNIFICANT CHANGE UP
DIFF PNL FLD: NEGATIVE — SIGNIFICANT CHANGE UP
GLUCOSE UR QL: NEGATIVE MG/DL — SIGNIFICANT CHANGE UP
KETONES UR-MCNC: NEGATIVE — SIGNIFICANT CHANGE UP
LEUKOCYTE ESTERASE UR-ACNC: NEGATIVE — SIGNIFICANT CHANGE UP
NITRITE UR-MCNC: NEGATIVE — SIGNIFICANT CHANGE UP
PH UR: 6 — SIGNIFICANT CHANGE UP (ref 5–8)
PROT UR-MCNC: NEGATIVE MG/DL — SIGNIFICANT CHANGE UP
SP GR SPEC: <=1.005 — SIGNIFICANT CHANGE UP (ref 1.01–1.03)
UROBILINOGEN FLD QL: 0.2 MG/DL — SIGNIFICANT CHANGE UP (ref 0.2–0.2)

## 2018-04-10 RX ORDER — NICOTINE POLACRILEX 2 MG
1 GUM BUCCAL DAILY
Qty: 0 | Refills: 0 | Status: DISCONTINUED | OUTPATIENT
Start: 2018-04-10 | End: 2018-04-11

## 2018-04-10 RX ORDER — ETRAVIRINE 200 MG/1
200 TABLET ORAL
Qty: 0 | Refills: 0 | Status: DISCONTINUED | OUTPATIENT
Start: 2018-04-10 | End: 2018-04-11

## 2018-04-10 RX ORDER — METHADONE HYDROCHLORIDE 40 MG/1
10 TABLET ORAL EVERY 12 HOURS
Qty: 0 | Refills: 0 | Status: DISCONTINUED | OUTPATIENT
Start: 2018-04-12 | End: 2018-04-11

## 2018-04-10 RX ORDER — RITONAVIR 100 MG/1
100 TABLET, FILM COATED ORAL
Qty: 0 | Refills: 0 | Status: DISCONTINUED | OUTPATIENT
Start: 2018-04-10 | End: 2018-04-11

## 2018-04-10 RX ORDER — ETRAVIRINE 200 MG/1
200 TABLET ORAL
Qty: 0 | Refills: 0 | Status: DISCONTINUED | OUTPATIENT
Start: 2018-04-10 | End: 2018-04-10

## 2018-04-10 RX ORDER — METHADONE HYDROCHLORIDE 40 MG/1
15 TABLET ORAL EVERY 12 HOURS
Qty: 0 | Refills: 0 | Status: COMPLETED | OUTPATIENT
Start: 2018-04-11 | End: 2018-04-11

## 2018-04-10 RX ORDER — METHADONE HYDROCHLORIDE 40 MG/1
5 TABLET ORAL EVERY 12 HOURS
Qty: 0 | Refills: 0 | Status: CANCELLED | OUTPATIENT
Start: 2018-04-13 | End: 2018-04-11

## 2018-04-10 RX ORDER — METHADONE HYDROCHLORIDE 40 MG/1
10 TABLET ORAL ONCE
Qty: 0 | Refills: 0 | Status: DISCONTINUED | OUTPATIENT
Start: 2018-04-10 | End: 2018-04-10

## 2018-04-10 RX ORDER — METHADONE HYDROCHLORIDE 40 MG/1
15 TABLET ORAL ONCE
Qty: 0 | Refills: 0 | Status: DISCONTINUED | OUTPATIENT
Start: 2018-04-10 | End: 2018-04-10

## 2018-04-10 RX ORDER — METHADONE HYDROCHLORIDE 40 MG/1
TABLET ORAL
Qty: 0 | Refills: 0 | Status: CANCELLED | OUTPATIENT
Start: 2018-04-11 | End: 2018-04-11

## 2018-04-10 RX ORDER — ACETAMINOPHEN 500 MG
650 TABLET ORAL EVERY 6 HOURS
Qty: 0 | Refills: 0 | Status: DISCONTINUED | OUTPATIENT
Start: 2018-04-10 | End: 2018-04-11

## 2018-04-10 RX ORDER — FOLIC ACID 0.8 MG
1 TABLET ORAL DAILY
Qty: 0 | Refills: 0 | Status: DISCONTINUED | OUTPATIENT
Start: 2018-04-10 | End: 2018-04-11

## 2018-04-10 RX ORDER — ETRAVIRINE 200 MG/1
100 TABLET ORAL
Qty: 0 | Refills: 0 | Status: DISCONTINUED | OUTPATIENT
Start: 2018-04-10 | End: 2018-04-10

## 2018-04-10 RX ORDER — HYDROXYZINE HCL 10 MG
50 TABLET ORAL EVERY 6 HOURS
Qty: 0 | Refills: 0 | Status: DISCONTINUED | OUTPATIENT
Start: 2018-04-10 | End: 2018-04-11

## 2018-04-10 RX ORDER — THIAMINE MONONITRATE (VIT B1) 100 MG
100 TABLET ORAL ONCE
Qty: 0 | Refills: 0 | Status: COMPLETED | OUTPATIENT
Start: 2018-04-10 | End: 2018-04-10

## 2018-04-10 RX ORDER — DARUNAVIR 75 MG/1
600 TABLET, FILM COATED ORAL
Qty: 0 | Refills: 0 | Status: DISCONTINUED | OUTPATIENT
Start: 2018-04-10 | End: 2018-04-11

## 2018-04-10 RX ORDER — IBUPROFEN 200 MG
400 TABLET ORAL EVERY 6 HOURS
Qty: 0 | Refills: 0 | Status: DISCONTINUED | OUTPATIENT
Start: 2018-04-10 | End: 2018-04-11

## 2018-04-10 RX ORDER — HYDROXYZINE HCL 10 MG
100 TABLET ORAL AT BEDTIME
Qty: 0 | Refills: 0 | Status: DISCONTINUED | OUTPATIENT
Start: 2018-04-10 | End: 2018-04-11

## 2018-04-10 RX ORDER — RALTEGRAVIR 400 MG/1
400 TABLET, FILM COATED ORAL
Qty: 0 | Refills: 0 | Status: DISCONTINUED | OUTPATIENT
Start: 2018-04-10 | End: 2018-04-11

## 2018-04-10 RX ADMIN — METHADONE HYDROCHLORIDE 10 MILLIGRAM(S): 40 TABLET ORAL at 18:45

## 2018-04-10 RX ADMIN — Medication 25 MILLIGRAM(S): at 17:59

## 2018-04-10 RX ADMIN — Medication 400 MILLIGRAM(S): at 18:44

## 2018-04-10 RX ADMIN — Medication 1 PATCH: at 22:11

## 2018-04-10 RX ADMIN — Medication 100 MILLIGRAM(S): at 21:49

## 2018-04-10 RX ADMIN — METHADONE HYDROCHLORIDE 15 MILLIGRAM(S): 40 TABLET ORAL at 20:46

## 2018-04-10 RX ADMIN — Medication 25 MILLIGRAM(S): at 20:31

## 2018-04-10 NOTE — H&P ADULT - ATTENDING COMMENTS
Patient interviewed and examined.    Chart reviewed.    PA's H&P noted and modified, as appropriate.    Case discussed on team rounds    Following is my summary of the case.    Admitted for detox: from ____ED, _x__Intake, ____Med/Surg Floor    Alcohol____   Opioid_____  Benzo___ Other_____    Substance amount, duration of use, last usage, and prior attempts at detox or rehabs, are outlined above in the H&P and discussed with patient.    Associated withdrawal symptoms presents.  Comorbid conditions noted. Chronic and Stable.    Past Medical Hx, Psych Hx, family Hx, Social Hx from H&P reviewed and NO changes.    Old medical record and medication Hx. Reviewed    Following items reviewed and addressed:  1. labs  2. EKG  3. Imaging from PACs module    Examination: no change from PA's exam.    Place on following protocol  _____Medically Managed  __X__Medically Supervised    Ciwa_x____Librium taper____Ativan taper___Methadone taper_x__ Phenobarb taper____ Suboxone Induction____MMTP____    Narcan Kit Offered    Psych Consult __X__N/A  ___Ordered    Physical Therapy  ___X N/A    ___  Ordered    Aftercare disposition to be addressed by counselors.    Estimated length of stay 3-5 days.

## 2018-04-10 NOTE — H&P ADULT - NSHPREVIEWOFSYSTEMS_GEN_ALL_CORE
REVIEW OF SYSTEMS:    CONSTITUTIONAL: +loss appetitie, +hot and chill intermittently, No weakness or fevers, No weight loss  PAIN (1 to 10 scles): 0  SLEEPING HABITS: +Insomnia, No OSCAR, Narcolepsy, or Somnambulism, Resltess leg  SKIN: No itching, rashes. pruritus, dryness, hair or nail changes.  EYES/ENT: No visual changes;  No vertigo or throat pain   NECK: No pain or stiffness  LYMPH NODES: No enlarged glands  RESPIRATORY: No cough, wheezing, hemoptysis; No shortness of breath  CARDIOVASCULAR: No chest pain or palpitations  BREASTS: No pain, masses, or nipple discharge   Last Mammography:   ENDOCRINE: No heat or cold intolerance; No hair loss  GASTROINTESTINAL: No Nausea or diarrhea in earlier, No abdominal pain. No vomiting, or hematemesis;  No melena or    hematochezia.  GENITOURINARY: No dysuria, frequency or hematuria, No penile discharge or testicular pain  NEUROLOGICAL: No numbness or weakness  MUSCULOSKELETAL: No arthritis, +myalgias, No joint and muscle stiffness  PSYCHIATRIC: + Anxiety, +Depression,  No mood swings, or difficulty sleeping, Denies S/H ideation, Denies AVH  Others:

## 2018-04-10 NOTE — H&P ADULT - NSHPPHYSICALEXAM_GEN_ALL_CORE
REVIEW OF SYSTEMS:    CONSTITUTIONAL: +loss appetitie, +hot and chill intermittently, No weakness or fevers, No weight loss  PAIN (1 to 10 scles):   SLEEPING HABITS: +Insomnia, No OSCAR, Narcolepsy, or Somnambulism, Resltess leg  SKIN: No itching, rashes. pruritus, dryness, hair or nail changes.  EYES/ENT: No visual changes;  No vertigo or throat pain   NECK: No pain or stiffness  LYMPH NODES: No enlarged glands  RESPIRATORY: No cough, wheezing, hemoptysis; No shortness of breath  CARDIOVASCULAR: No chest pain or palpitations  BREASTS: No pain, masses, or nipple discharge   Last Mammography:   ENDOCRINE: No heat or cold intolerance; No hair loss  GASTROINTESTINAL: No Nausea or diarrhea in earlier, No abdominal pain. No vomiting, or hematemesis;  No melena or    hematochezia.  GENITOURINARY: No dysuria, frequency or hematuria, No penile discharge or testicular pain  NEUROLOGICAL: No numbness or weakness  MUSCULOSKELETAL: No arthritis, +myalgias, No joint and muscle stiffness(+) Skin Track marks B/L UE  PSYCHIATRIC: + Anxiety, +Depression,  No mood swings, or difficulty sleeping, Denies S/H ideation, Denies AVH  Others:

## 2018-04-10 NOTE — H&P ADULT - PROBLEM SELECTOR PLAN 2
Check Urine Toxicology  Librium 25 mg po q4 h prn X 72 H  Thiamine 100mg PO daily  Folic Acid 1mg PO daily  MVI 1 tablet PO daily  Monitor VS and withdrawal symptoms  PRN Medications

## 2018-04-10 NOTE — H&P ADULT - ASSESSMENT
48 Y/O White Male with PMHx of (+)HIV,and (+)HEP-C requesting Detox, Prior hx of > 20 detoxes in the past ,Last Detox At [facility] 1/31/18,relapsed 2 months ago.

## 2018-04-10 NOTE — H&P ADULT - PROBLEM SELECTOR PLAN 4
continue :  Norvir 100 mg po BID  Isentress 400 mg po BID  Prezista 600 mg po BID  Intelence 200 Mg po BID  Universal Precaution  F/U with Dr. Mccloud after D/C

## 2018-04-10 NOTE — H&P ADULT - PMH
EtOH dependence    Hepatitis-C    HIV (human immunodeficiency virus infection)    Nicotine dependence    PPD+ (purified protein derivative positive)

## 2018-04-10 NOTE — H&P ADULT - PROBLEM SELECTOR PLAN 1
Check Urine Toxicology  Methadone Taper Protocol  Monitor VS and withdrawal symptoms  Prn Medication

## 2018-04-10 NOTE — H&P ADULT - HISTORY OF PRESENT ILLNESS
46 Y/O White Male with PMHx of (+)HIV,and (+)HEP-C requesting Detox, Prior hx of > 20 detoxes in the past ,Last Detox At [facility] 1/31/18,relapsed 2 months ago.  pt Currently using:  DRUG	AGE OF ONSET	ROUTE	FREQ	AMOUNT	LAST USE	LENGTH OF CURRENT USE	  Heroin 	17	IV	Daily	15 Bags	4/10/18 3 Bags	2 Month	  Alcohol	17	Po	Daily	2Pints Vodka,and A case of BEER	4/10/18 1 Pint	2 month	  Pt denied any other Drugs abuse							  							  							    Screening history	Last tested	Result	History of treatment	  HIV	1993	(+)	N/A	  Hepatitis C	1/18	(+)	N/A	  PPD  	1990	(+)	N/A

## 2018-04-11 VITALS
DIASTOLIC BLOOD PRESSURE: 69 MMHG | HEART RATE: 86 BPM | TEMPERATURE: 99 F | SYSTOLIC BLOOD PRESSURE: 138 MMHG | RESPIRATION RATE: 16 BRPM

## 2018-04-11 LAB
AMPHET UR-MCNC: NEGATIVE — SIGNIFICANT CHANGE UP
BARBITURATES UR SCN-MCNC: NEGATIVE — SIGNIFICANT CHANGE UP
BENZODIAZ UR-MCNC: NEGATIVE — SIGNIFICANT CHANGE UP
COCAINE METAB.OTHER UR-MCNC: NEGATIVE — SIGNIFICANT CHANGE UP
DRUG SCREEN 1, URINE RESULT: SIGNIFICANT CHANGE UP
METHADONE UR-MCNC: NEGATIVE — SIGNIFICANT CHANGE UP
OPIATES UR-MCNC: POSITIVE
PCP UR-MCNC: NEGATIVE — SIGNIFICANT CHANGE UP
PROPOXYPHENE QUALITATIVE URINE RESULT: NEGATIVE — SIGNIFICANT CHANGE UP
THC UR QL: NEGATIVE — SIGNIFICANT CHANGE UP

## 2018-04-11 RX ORDER — TUBERCULIN PURIFIED PROTEIN DERIVATIVE 5 [IU]/.1ML
5 INJECTION, SOLUTION INTRADERMAL ONCE
Qty: 0 | Refills: 0 | Status: DISCONTINUED | OUTPATIENT
Start: 2018-04-11 | End: 2018-04-11

## 2018-04-11 RX ADMIN — Medication 50 MILLIGRAM(S): at 03:21

## 2018-04-11 RX ADMIN — Medication 25 MILLIGRAM(S): at 07:27

## 2018-04-11 RX ADMIN — METHADONE HYDROCHLORIDE 15 MILLIGRAM(S): 40 TABLET ORAL at 09:18

## 2018-04-11 RX ADMIN — Medication 0.1 MILLIGRAM(S): at 03:21

## 2018-04-11 RX ADMIN — Medication 300 MILLIGRAM(S): at 07:27

## 2018-04-11 RX ADMIN — Medication 2 MILLIGRAM(S): at 09:19

## 2018-04-11 RX ADMIN — Medication 25 MILLIGRAM(S): at 03:21

## 2018-04-11 NOTE — CHART NOTE - NSCHARTNOTEFT_GEN_A_CORE
Allergies:  No Known Allergies      Diet: Regular    Activity: as tolerated    Follow up with    1. PMD in 2 weeks    2. Ensure to follow up with Oncology regarding your treatment.    Extra Instructions:      Flu Vaccine given  Yes_____         No______      Diagnosis:  Chemical Dependency   Pt insisting on leaving AMA bc he is not feeling well, despit additonional mediations given overnight. Pt still does not want to stay , even if addionalr methadone or librium is ordered. He has hx liver ca/HIV, scedhuled for alek,pterapy. Pt is A&Ox3, denies SI/HI. Instructed to ensure Follow-up with PMD in 1-2 weeks. with ocology amd PMD. Educated on riks of WD, including seizure/death. Pt demonstrates undertsanding.      Patient Signature___________________________________________  Date_________________      Nurse Signature_____________________________________________Date_________________

## 2018-04-16 DIAGNOSIS — F10.20 ALCOHOL DEPENDENCE, UNCOMPLICATED: ICD-10-CM

## 2018-04-16 DIAGNOSIS — F17.210 NICOTINE DEPENDENCE, CIGARETTES, UNCOMPLICATED: ICD-10-CM

## 2018-04-16 DIAGNOSIS — Z21 ASYMPTOMATIC HUMAN IMMUNODEFICIENCY VIRUS [HIV] INFECTION STATUS: ICD-10-CM

## 2018-04-16 DIAGNOSIS — B19.20 UNSPECIFIED VIRAL HEPATITIS C WITHOUT HEPATIC COMA: ICD-10-CM

## 2018-04-16 DIAGNOSIS — F11.20 OPIOID DEPENDENCE, UNCOMPLICATED: ICD-10-CM

## 2018-05-16 ENCOUNTER — OUTPATIENT (OUTPATIENT)
Dept: OUTPATIENT SERVICES | Facility: HOSPITAL | Age: 48
LOS: 1 days | Discharge: HOME | End: 2018-05-16

## 2018-05-16 DIAGNOSIS — B20 HUMAN IMMUNODEFICIENCY VIRUS [HIV] DISEASE: ICD-10-CM

## 2018-06-05 ENCOUNTER — APPOINTMENT (OUTPATIENT)
Dept: HEMATOLOGY ONCOLOGY | Facility: CLINIC | Age: 48
End: 2018-06-05

## 2018-06-05 ENCOUNTER — INPATIENT (INPATIENT)
Facility: HOSPITAL | Age: 48
LOS: 16 days | End: 2018-06-22
Attending: SPECIALIST | Admitting: SPECIALIST

## 2018-06-05 ENCOUNTER — OUTPATIENT (OUTPATIENT)
Dept: OUTPATIENT SERVICES | Facility: HOSPITAL | Age: 48
LOS: 1 days | Discharge: HOME | End: 2018-06-05

## 2018-06-05 VITALS
HEART RATE: 115 BPM | RESPIRATION RATE: 18 BRPM | DIASTOLIC BLOOD PRESSURE: 75 MMHG | OXYGEN SATURATION: 95 % | TEMPERATURE: 98 F | SYSTOLIC BLOOD PRESSURE: 135 MMHG

## 2018-06-05 VITALS
HEART RATE: 119 BPM | BODY MASS INDEX: 24.25 KG/M2 | HEIGHT: 68 IN | WEIGHT: 160 LBS | TEMPERATURE: 98.9 F | SYSTOLIC BLOOD PRESSURE: 140 MMHG | RESPIRATION RATE: 14 BRPM | DIASTOLIC BLOOD PRESSURE: 85 MMHG

## 2018-06-05 DIAGNOSIS — Z72.0 TOBACCO USE: ICD-10-CM

## 2018-06-05 DIAGNOSIS — B20 HUMAN IMMUNODEFICIENCY VIRUS [HIV] DISEASE: ICD-10-CM

## 2018-06-05 DIAGNOSIS — C22.9 MALIGNANT NEOPLASM OF LIVER, NOT SPECIFIED AS PRIMARY OR SECONDARY: ICD-10-CM

## 2018-06-05 DIAGNOSIS — Z86.19 PERSONAL HISTORY OF OTHER INFECTIOUS AND PARASITIC DISEASES: ICD-10-CM

## 2018-06-05 LAB
ALBUMIN SERPL ELPH-MCNC: 2.8 G/DL — LOW (ref 3.5–5.2)
ALP SERPL-CCNC: 279 U/L — HIGH (ref 30–115)
ALT FLD-CCNC: 135 U/L — HIGH (ref 0–41)
ANION GAP SERPL CALC-SCNC: 18 MMOL/L — HIGH (ref 7–14)
APTT BLD: 26.5 SEC — LOW (ref 27–39.2)
AST SERPL-CCNC: 466 U/L — HIGH (ref 0–41)
BASOPHILS # BLD AUTO: 0.04 K/UL — SIGNIFICANT CHANGE UP (ref 0–0.2)
BASOPHILS NFR BLD AUTO: 0.4 % — SIGNIFICANT CHANGE UP (ref 0–1)
BILIRUB DIRECT SERPL-MCNC: 0.4 MG/DL — HIGH (ref 0–0.2)
BILIRUB INDIRECT FLD-MCNC: 0.9 MG/DL — SIGNIFICANT CHANGE UP (ref 0.2–1.2)
BILIRUB SERPL-MCNC: 1.3 MG/DL — HIGH (ref 0.2–1.2)
BUN SERPL-MCNC: 7 MG/DL — LOW (ref 10–20)
CALCIUM SERPL-MCNC: 8.4 MG/DL — LOW (ref 8.5–10.1)
CHLORIDE SERPL-SCNC: 80 MMOL/L — LOW (ref 98–110)
CO2 SERPL-SCNC: 25 MMOL/L — SIGNIFICANT CHANGE UP (ref 17–32)
CREAT SERPL-MCNC: 0.6 MG/DL — LOW (ref 0.7–1.5)
EOSINOPHIL # BLD AUTO: 0.03 K/UL — SIGNIFICANT CHANGE UP (ref 0–0.7)
EOSINOPHIL NFR BLD AUTO: 0.3 % — SIGNIFICANT CHANGE UP (ref 0–8)
GLUCOSE SERPL-MCNC: 70 MG/DL — SIGNIFICANT CHANGE UP (ref 70–99)
HCT VFR BLD CALC: 36 % — LOW (ref 42–52)
HGB BLD-MCNC: 11.8 G/DL — LOW (ref 14–18)
IMM GRANULOCYTES NFR BLD AUTO: 1.1 % — HIGH (ref 0.1–0.3)
INR BLD: 1.36 RATIO — HIGH (ref 0.65–1.3)
LIDOCAIN IGE QN: 82 U/L — HIGH (ref 7–60)
LYMPHOCYTES # BLD AUTO: 0.79 K/UL — LOW (ref 1.2–3.4)
LYMPHOCYTES # BLD AUTO: 7.4 % — LOW (ref 20.5–51.1)
MCHC RBC-ENTMCNC: 27.6 PG — SIGNIFICANT CHANGE UP (ref 27–31)
MCHC RBC-ENTMCNC: 32.8 G/DL — SIGNIFICANT CHANGE UP (ref 32–37)
MCV RBC AUTO: 84.3 FL — SIGNIFICANT CHANGE UP (ref 80–94)
MONOCYTES # BLD AUTO: 1.47 K/UL — HIGH (ref 0.1–0.6)
MONOCYTES NFR BLD AUTO: 13.8 % — HIGH (ref 1.7–9.3)
NEUTROPHILS # BLD AUTO: 8.23 K/UL — HIGH (ref 1.4–6.5)
NEUTROPHILS NFR BLD AUTO: 77 % — HIGH (ref 42.2–75.2)
NRBC # BLD: 0 /100 WBCS — SIGNIFICANT CHANGE UP (ref 0–0)
PLATELET # BLD AUTO: 200 K/UL — SIGNIFICANT CHANGE UP (ref 130–400)
POTASSIUM SERPL-MCNC: 5.5 MMOL/L — HIGH (ref 3.5–5)
POTASSIUM SERPL-SCNC: 5.5 MMOL/L — HIGH (ref 3.5–5)
PROT SERPL-MCNC: 7.6 G/DL — SIGNIFICANT CHANGE UP (ref 6–8)
PROTHROM AB SERPL-ACNC: 14.8 SEC — HIGH (ref 9.95–12.87)
RBC # BLD: 4.27 M/UL — LOW (ref 4.7–6.1)
RBC # FLD: 15.6 % — HIGH (ref 11.5–14.5)
SODIUM SERPL-SCNC: 123 MMOL/L — LOW (ref 135–146)
WBC # BLD: 10.68 K/UL — SIGNIFICANT CHANGE UP (ref 4.8–10.8)
WBC # FLD AUTO: 10.68 K/UL — SIGNIFICANT CHANGE UP (ref 4.8–10.8)

## 2018-06-05 RX ORDER — AMOXICILLIN 500 MG/1
500 CAPSULE ORAL
Qty: 150 | Refills: 0 | Status: DISCONTINUED | COMMUNITY
Start: 2018-01-23

## 2018-06-05 RX ORDER — DRONABINOL 10 MG/1
10 CAPSULE ORAL TWICE DAILY
Qty: 60 | Refills: 0 | Status: ACTIVE | COMMUNITY
Start: 2018-06-05 | End: 1900-01-01

## 2018-06-05 RX ORDER — METHADONE HYDROCHLORIDE 5 MG/1
TABLET ORAL
Refills: 0 | Status: ACTIVE | COMMUNITY

## 2018-06-05 RX ORDER — GENTAMICIN SULFATE 40 MG/ML
40 INJECTION, SOLUTION INTRAMUSCULAR; INTRAVENOUS
Qty: 24 | Refills: 0 | Status: DISCONTINUED | COMMUNITY
Start: 2018-01-17

## 2018-06-05 RX ORDER — AMPICILLIN 2 G/1
2 INJECTION, POWDER, FOR SOLUTION INTRAMUSCULAR; INTRAVENOUS
Qty: 8 | Refills: 0 | Status: DISCONTINUED | COMMUNITY
Start: 2018-01-17

## 2018-06-05 RX ORDER — OXYCODONE 5 MG/1
5 TABLET ORAL
Qty: 30 | Refills: 0 | Status: DISCONTINUED | COMMUNITY
Start: 2017-12-04

## 2018-06-05 NOTE — ED PROVIDER NOTE - PHYSICAL EXAMINATION
VITAL SIGNS: I have reviewed nursing notes and confirm.  CONSTITUTIONAL: Chronically ill appearing male sitting on stretcher; in no acute distress.  SKIN: Skin exam is warm and dry, no acute rash.  HEAD: Normocephalic; atraumatic.  EYES: Conjunctiva and sclera clear.  ENT: No nasal discharge; airway clear.   NECK: Supple; non tender.  CARD: S1, S2 normal; no murmurs, gallops, or rubs. Regular rate and rhythm.  RESP: No wheezes, rales or rhonchi. Speaking in full sentences.   ABD: Normal bowel sounds; soft; (+) diffuse distention. No TTP.   EXT: Normal ROM. No clubbing, cyanosis or edema.  NEURO: Alert, oriented. Grossly unremarkable. No focal deficits.

## 2018-06-05 NOTE — ED ADULT NURSE REASSESSMENT NOTE - NS ED NURSE REASSESS COMMENT FT1
Paracentesis done at bedside on abdomen LLQ, time out taken, patient identification confirmed, sterility confirmed, 3 Liters of fluids drained. No acute signs of distress noted.

## 2018-06-05 NOTE — ED PROVIDER NOTE - MEDICAL DECISION MAKING DETAILS
pt admitted for hyponatemia. ascities drained, fluid sent to lab. inpatient team to order fluid testing if needed

## 2018-06-05 NOTE — ED PROVIDER NOTE - CARE PLAN
Principal Discharge DX:	Hyponatremia  Secondary Diagnosis:	Ascites  Secondary Diagnosis:	HIV (human immunodeficiency virus infection)

## 2018-06-05 NOTE — ED PROVIDER NOTE - PROGRESS NOTE DETAILS
Attending note:  48 y/o M with HX of liver CA, here for abdominal distention. Pt reports that he saw Dr. Reilly today in the office who felt that the pt would benefit from a therapeutic paracentesis to be done in the ED. Pt reports gradually worsening abdominal distention over the past few weeks. He has appropriate follow-up with hepatology, hem/onc, and pain management.   On exam: Pt is in no distress, S1S2, CTAB, abdomen distended/non-tender, neuro grossly intact.    Plan: Will check basic labs and discuss with hem/onc fellow. Discussed case with Dr. Bolton (heme/onc fellow) states can take off 2-3 L and pt can follow up with Dr. Reilly on Monday.

## 2018-06-05 NOTE — ED ADULT NURSE NOTE - NS ED NURSE REPORT GIVEN TO FT
Left a VM (at preferred number )-  Awaiting for Pt to call back to go over recommendations. BRENT 3B RN

## 2018-06-06 DIAGNOSIS — Z90.49 ACQUIRED ABSENCE OF OTHER SPECIFIED PARTS OF DIGESTIVE TRACT: Chronic | ICD-10-CM

## 2018-06-06 PROBLEM — C22.9: Status: ACTIVE | Noted: 2018-06-05

## 2018-06-06 LAB
ALBUMIN SERPL ELPH-MCNC: 2.6 G/DL — LOW (ref 3.5–5.2)
ALP SERPL-CCNC: 247 U/L — HIGH (ref 30–115)
ALT FLD-CCNC: 127 U/L — HIGH (ref 0–41)
ANION GAP SERPL CALC-SCNC: 16 MMOL/L — HIGH (ref 7–14)
ANION GAP SERPL CALC-SCNC: 17 MMOL/L — HIGH (ref 7–14)
AST SERPL-CCNC: 371 U/L — HIGH (ref 0–41)
BILIRUB SERPL-MCNC: 1.4 MG/DL — HIGH (ref 0.2–1.2)
BUN SERPL-MCNC: 7 MG/DL — LOW (ref 10–20)
BUN SERPL-MCNC: 8 MG/DL — LOW (ref 10–20)
CALCIUM SERPL-MCNC: 8.1 MG/DL — LOW (ref 8.5–10.1)
CALCIUM SERPL-MCNC: 8.2 MG/DL — LOW (ref 8.5–10.1)
CHLORIDE SERPL-SCNC: 82 MMOL/L — LOW (ref 98–110)
CHLORIDE SERPL-SCNC: 86 MMOL/L — LOW (ref 98–110)
CO2 SERPL-SCNC: 23 MMOL/L — SIGNIFICANT CHANGE UP (ref 17–32)
CO2 SERPL-SCNC: 25 MMOL/L — SIGNIFICANT CHANGE UP (ref 17–32)
CREAT SERPL-MCNC: 0.5 MG/DL — LOW (ref 0.7–1.5)
CREAT SERPL-MCNC: 0.7 MG/DL — SIGNIFICANT CHANGE UP (ref 0.7–1.5)
GLUCOSE SERPL-MCNC: 69 MG/DL — LOW (ref 70–99)
GLUCOSE SERPL-MCNC: 95 MG/DL — SIGNIFICANT CHANGE UP (ref 70–99)
HCT VFR BLD CALC: 34.9 % — LOW (ref 42–52)
HGB BLD-MCNC: 11.4 G/DL — LOW (ref 14–18)
MAGNESIUM SERPL-MCNC: 1.6 MG/DL — LOW (ref 1.8–2.4)
MCHC RBC-ENTMCNC: 27.6 PG — SIGNIFICANT CHANGE UP (ref 27–31)
MCHC RBC-ENTMCNC: 32.7 G/DL — SIGNIFICANT CHANGE UP (ref 32–37)
MCV RBC AUTO: 84.5 FL — SIGNIFICANT CHANGE UP (ref 80–94)
NRBC # BLD: 0 /100 WBCS — SIGNIFICANT CHANGE UP (ref 0–0)
PHOSPHATE SERPL-MCNC: 2.9 MG/DL — SIGNIFICANT CHANGE UP (ref 2.1–4.9)
PLATELET # BLD AUTO: 153 K/UL — SIGNIFICANT CHANGE UP (ref 130–400)
POTASSIUM SERPL-MCNC: 4.3 MMOL/L — SIGNIFICANT CHANGE UP (ref 3.5–5)
POTASSIUM SERPL-MCNC: 4.5 MMOL/L — SIGNIFICANT CHANGE UP (ref 3.5–5)
POTASSIUM SERPL-SCNC: 4.3 MMOL/L — SIGNIFICANT CHANGE UP (ref 3.5–5)
POTASSIUM SERPL-SCNC: 4.5 MMOL/L — SIGNIFICANT CHANGE UP (ref 3.5–5)
PROT SERPL-MCNC: 6.5 G/DL — SIGNIFICANT CHANGE UP (ref 6–8)
RBC # BLD: 4.13 M/UL — LOW (ref 4.7–6.1)
RBC # FLD: 15.5 % — HIGH (ref 11.5–14.5)
SODIUM SERPL-SCNC: 124 MMOL/L — LOW (ref 135–146)
SODIUM SERPL-SCNC: 125 MMOL/L — LOW (ref 135–146)
WBC # BLD: 8.05 K/UL — SIGNIFICANT CHANGE UP (ref 4.8–10.8)
WBC # FLD AUTO: 8.05 K/UL — SIGNIFICANT CHANGE UP (ref 4.8–10.8)

## 2018-06-06 RX ORDER — MORPHINE SULFATE 50 MG/1
3 CAPSULE, EXTENDED RELEASE ORAL ONCE
Qty: 0 | Refills: 0 | Status: DISCONTINUED | OUTPATIENT
Start: 2018-06-06 | End: 2018-06-06

## 2018-06-06 RX ORDER — ETRAVIRINE 200 MG/1
200 TABLET ORAL
Qty: 0 | Refills: 0 | Status: DISCONTINUED | OUTPATIENT
Start: 2018-06-06 | End: 2018-06-08

## 2018-06-06 RX ORDER — NICOTINE POLACRILEX 2 MG
1 GUM BUCCAL DAILY
Qty: 0 | Refills: 0 | Status: DISCONTINUED | OUTPATIENT
Start: 2018-06-06 | End: 2018-06-13

## 2018-06-06 RX ORDER — SODIUM CHLORIDE 9 MG/ML
1000 INJECTION INTRAMUSCULAR; INTRAVENOUS; SUBCUTANEOUS ONCE
Qty: 0 | Refills: 0 | Status: COMPLETED | OUTPATIENT
Start: 2018-06-06 | End: 2018-06-06

## 2018-06-06 RX ORDER — MULTIVIT-MIN/FERROUS GLUCONATE 9 MG/15 ML
1 LIQUID (ML) ORAL DAILY
Qty: 0 | Refills: 0 | Status: DISCONTINUED | OUTPATIENT
Start: 2018-06-06 | End: 2018-06-12

## 2018-06-06 RX ORDER — METHADONE HYDROCHLORIDE 40 MG/1
110 TABLET ORAL DAILY
Qty: 0 | Refills: 0 | Status: DISCONTINUED | OUTPATIENT
Start: 2018-06-06 | End: 2018-06-13

## 2018-06-06 RX ORDER — RITONAVIR 100 MG/1
100 TABLET, FILM COATED ORAL
Qty: 0 | Refills: 0 | Status: DISCONTINUED | OUTPATIENT
Start: 2018-06-06 | End: 2018-06-08

## 2018-06-06 RX ORDER — DRONABINOL 2.5 MG
105 CAPSULE ORAL
Qty: 0 | Refills: 0 | Status: DISCONTINUED | OUTPATIENT
Start: 2018-06-06 | End: 2018-06-06

## 2018-06-06 RX ORDER — MAGNESIUM SULFATE 500 MG/ML
2 VIAL (ML) INJECTION ONCE
Qty: 0 | Refills: 0 | Status: COMPLETED | OUTPATIENT
Start: 2018-06-06 | End: 2018-06-06

## 2018-06-06 RX ORDER — ENOXAPARIN SODIUM 100 MG/ML
30 INJECTION SUBCUTANEOUS DAILY
Qty: 0 | Refills: 0 | Status: DISCONTINUED | OUTPATIENT
Start: 2018-06-06 | End: 2018-06-12

## 2018-06-06 RX ORDER — IBUPROFEN 200 MG
400 TABLET ORAL THREE TIMES A DAY
Qty: 0 | Refills: 0 | Status: DISCONTINUED | OUTPATIENT
Start: 2018-06-06 | End: 2018-06-12

## 2018-06-06 RX ORDER — SPIRONOLACTONE 25 MG/1
100 TABLET, FILM COATED ORAL DAILY
Qty: 0 | Refills: 0 | Status: DISCONTINUED | OUTPATIENT
Start: 2018-06-06 | End: 2018-06-13

## 2018-06-06 RX ORDER — RALTEGRAVIR 400 MG/1
400 TABLET, FILM COATED ORAL
Qty: 0 | Refills: 0 | Status: DISCONTINUED | OUTPATIENT
Start: 2018-06-06 | End: 2018-06-08

## 2018-06-06 RX ORDER — THIAMINE MONONITRATE (VIT B1) 100 MG
100 TABLET ORAL DAILY
Qty: 0 | Refills: 0 | Status: DISCONTINUED | OUTPATIENT
Start: 2018-06-06 | End: 2018-06-12

## 2018-06-06 RX ORDER — FUROSEMIDE 40 MG
40 TABLET ORAL DAILY
Qty: 0 | Refills: 0 | Status: DISCONTINUED | OUTPATIENT
Start: 2018-06-06 | End: 2018-06-13

## 2018-06-06 RX ORDER — DARUNAVIR 75 MG/1
600 TABLET, FILM COATED ORAL
Qty: 0 | Refills: 0 | Status: DISCONTINUED | OUTPATIENT
Start: 2018-06-06 | End: 2018-06-08

## 2018-06-06 RX ORDER — LACTULOSE 10 G/15ML
20 SOLUTION ORAL
Qty: 0 | Refills: 0 | Status: DISCONTINUED | OUTPATIENT
Start: 2018-06-06 | End: 2018-06-07

## 2018-06-06 RX ORDER — MORPHINE SULFATE 50 MG/1
2 CAPSULE, EXTENDED RELEASE ORAL ONCE
Qty: 0 | Refills: 0 | Status: DISCONTINUED | OUTPATIENT
Start: 2018-06-06 | End: 2018-06-06

## 2018-06-06 RX ORDER — DRONABINOL 2.5 MG
10 CAPSULE ORAL
Qty: 0 | Refills: 0 | Status: DISCONTINUED | OUTPATIENT
Start: 2018-06-06 | End: 2018-06-13

## 2018-06-06 RX ORDER — FOLIC ACID 0.8 MG
1 TABLET ORAL DAILY
Qty: 0 | Refills: 0 | Status: DISCONTINUED | OUTPATIENT
Start: 2018-06-06 | End: 2018-06-12

## 2018-06-06 RX ADMIN — Medication 2 MILLIGRAM(S): at 21:01

## 2018-06-06 RX ADMIN — ETRAVIRINE 200 MILLIGRAM(S): 200 TABLET ORAL at 21:05

## 2018-06-06 RX ADMIN — MORPHINE SULFATE 3 MILLIGRAM(S): 50 CAPSULE, EXTENDED RELEASE ORAL at 03:57

## 2018-06-06 RX ADMIN — RALTEGRAVIR 400 MILLIGRAM(S): 400 TABLET, FILM COATED ORAL at 21:04

## 2018-06-06 RX ADMIN — DARUNAVIR 600 MILLIGRAM(S): 75 TABLET, FILM COATED ORAL at 21:04

## 2018-06-06 RX ADMIN — MORPHINE SULFATE 3 MILLIGRAM(S): 50 CAPSULE, EXTENDED RELEASE ORAL at 04:47

## 2018-06-06 RX ADMIN — MORPHINE SULFATE 2 MILLIGRAM(S): 50 CAPSULE, EXTENDED RELEASE ORAL at 10:45

## 2018-06-06 RX ADMIN — Medication 400 MILLIGRAM(S): at 21:40

## 2018-06-06 RX ADMIN — Medication 50 GRAM(S): at 16:33

## 2018-06-06 RX ADMIN — MORPHINE SULFATE 2 MILLIGRAM(S): 50 CAPSULE, EXTENDED RELEASE ORAL at 10:59

## 2018-06-06 RX ADMIN — METHADONE HYDROCHLORIDE 110 MILLIGRAM(S): 40 TABLET ORAL at 12:17

## 2018-06-06 RX ADMIN — ETRAVIRINE 200 MILLIGRAM(S): 200 TABLET ORAL at 10:25

## 2018-06-06 RX ADMIN — RITONAVIR 100 MILLIGRAM(S): 100 TABLET, FILM COATED ORAL at 05:38

## 2018-06-06 RX ADMIN — Medication 2 MILLIGRAM(S): at 05:38

## 2018-06-06 RX ADMIN — RITONAVIR 100 MILLIGRAM(S): 100 TABLET, FILM COATED ORAL at 21:05

## 2018-06-06 RX ADMIN — ENOXAPARIN SODIUM 30 MILLIGRAM(S): 100 INJECTION SUBCUTANEOUS at 12:17

## 2018-06-06 RX ADMIN — Medication 1 PATCH: at 02:53

## 2018-06-06 RX ADMIN — DARUNAVIR 600 MILLIGRAM(S): 75 TABLET, FILM COATED ORAL at 05:37

## 2018-06-06 RX ADMIN — Medication 2 MILLIGRAM(S): at 13:02

## 2018-06-06 RX ADMIN — Medication 2 MILLIGRAM(S): at 10:24

## 2018-06-06 RX ADMIN — RALTEGRAVIR 400 MILLIGRAM(S): 400 TABLET, FILM COATED ORAL at 05:37

## 2018-06-06 RX ADMIN — Medication 2 MILLIGRAM(S): at 17:11

## 2018-06-06 RX ADMIN — Medication 40 MILLIGRAM(S): at 05:38

## 2018-06-06 RX ADMIN — SODIUM CHLORIDE 1000 MILLILITER(S): 9 INJECTION INTRAMUSCULAR; INTRAVENOUS; SUBCUTANEOUS at 01:24

## 2018-06-06 NOTE — H&P ADULT - NSHPPHYSICALEXAM_GEN_ALL_CORE
GENERAL: c/o abdominal pain, looks in pain  HEAD:  Atraumatic, Normocephalic  EYES: conjuctiva/sclera clear  NECK: Supple  CHEST/LUNG: Clear to auscultation bilaterally; No wheeze  HEART: Regular rate and rhythm; No murmurs, rubs, or gallops  ABDOMEN: distended, soft in lower abdomen, hard, fixed, irregular mass palpable in epigastric region and RUQ, tender in epigastric and RUQ   EXTREMITIES:  b/l pitting edema +  PSYCH: AAOx3  NEUROLOGY: non-focal

## 2018-06-06 NOTE — CONSULT NOTE ADULT - SUBJECTIVE AND OBJECTIVE BOX
NEPHROLOGY CONSULTATION NOTE    Patient is a 47 year old male with past medical history of HIV on HAART therapy (CD4 152), liver cirrhosis/carcinoma, hepatitis C infection s/p treatment, alcohol abuse, heroin abuse no methadone treatment who was sent by Dr Reilly, oncologist, for therapeutic paracentesis. Patient complained of nausea, nonbloody, nonbilious vomiting and abdominal distention for the past month. Patient also reported on and off fever and chills x 1 month.     In the ED 3L of ascitic fluid was removed. Patient was to be discharged but was found to be hyponatremic, serum Na 123.     Upon ROS patient currently denies fever, chills, nausea, vomiting, diarrhea, constipation or changes in urination.     PAST MEDICAL & SURGICAL HISTORY:  PPD+ (purified protein derivative positive)  Hepatitis-C: s/p treatment  Nicotine dependence  HIV (human immunodeficiency virus infection): on HAART  EtOH dependence  H/O resection of liver: partial    Allergies:  No Known Allergies    Home Medications Reviewed  Hospital Medications:   MEDICATIONS  (STANDING):  darunavir 600 milliGRAM(s) Oral two times a day  dronabinol 10 milliGRAM(s) Oral two times a day  enoxaparin Injectable 30 milliGRAM(s) SubCutaneous daily  etravirine 200 milliGRAM(s) Oral two times a day after meals  folic acid 1 milliGRAM(s) Oral daily  furosemide    Tablet 40 milliGRAM(s) Oral daily  LORazepam   Injectable 2 milliGRAM(s) IV Push every 4 hours  LORazepam   Injectable   IV Push   magnesium sulfate  IVPB 2 Gram(s) IV Intermittent once  methadone    Tablet 110 milliGRAM(s) Oral daily  multivitamin/minerals 1 Tablet(s) Oral daily  nicotine - 21 mG/24Hr(s) Patch 1 patch Transdermal daily  raltegravir Tablet 400 milliGRAM(s) Oral two times a day  ritonavir Tablet 100 milliGRAM(s) Oral two times a day  spironolactone 100 milliGRAM(s) Oral daily  thiamine 100 milliGRAM(s) Oral daily      SOCIAL HISTORY:  Denies ETOH,Smoking,   FAMILY HISTORY:  No pertinent family history in first degree relatives        REVIEW OF SYSTEMS:   All other review of systems is negative unless indicated above.    VITALS:  T(F): 96.7 (06-06-18 @ 12:55), Max: 98 (06-05-18 @ 19:03)  HR: 107 (06-06-18 @ 12:55)  BP: 110/75 (06-06-18 @ 12:55)  RR: 20 (06-06-18 @ 12:55)  SpO2: 95% (06-06-18 @ 10:26)    06-06 @ 07:01  -  06-06 @ 15:32  --------------------------------------------------------  IN: 0 mL / OUT: 1 mL / NET: -1 mL      Height (cm): 172.72 (06-06 @ 04:44)  Weight (kg): 71 (06-06 @ 04:44)  BMI (kg/m2): 23.8 (06-06 @ 04:44)  BSA (m2): 1.84 (06-06 @ 04:44)      I&O's Detail    06 Jun 2018 07:01  -  06 Jun 2018 15:32  --------------------------------------------------------  IN:  Total IN: 0 mL    OUT:    Voided: 1 mL  Total OUT: 1 mL    Total NET: -1 mL            PHYSICAL EXAM:  Constitutional: NAD  HEENT: anicteric sclera, oropharynx clear, MMM  Neck: No JVD  Respiratory: CTAB, no wheezes, rales or rhonchi  Cardiovascular: S1, S2, RRR  Gastrointestinal: rigid, distended, nontender, bowel sounds present, caput medusa present  Extremities: No cyanosis or clubbing. No peripheral edema  Neurological: A/O x 3, no focal deficits  Psychiatric: Normal mood, normal affect  : No CVA tenderness. No rivera.   Skin: No rashes  Vascular Access: in place    LABS:  06-06    125<L>  |  86<L>  |  7<L>  ----------------------------<  69<L>  4.3   |  23  |  0.5<L>    SODIUM TREND:  Sodium 125 [06-06 @ 11:55]  Sodium 123 [06-05 @ 20:43]    Ca    8.1<L>      06 Jun 2018 11:55  Phos  2.9     06-06  Mg     1.6     06-06    TPro  6.5  /  Alb  2.6<L>  /  TBili  1.4<H>  /  DBili      /  AST  371<H>  /  ALT  127<H>  /  AlkPhos  247<H>  06-06    Creatinine Trend: 0.5 <--, 0.6 <--                        11.4   8.05  )-----------( 153      ( 06 Jun 2018 11:55 )             34.9     Urine Studies: pending    RADIOLOGY & ADDITIONAL STUDIES:

## 2018-06-06 NOTE — H&P ADULT - NSHPSOCIALHISTORY_GEN_ALL_CORE
Social History:    Marital Status:  (  X )    (   ) Single    (   )    (  )   Lives with: (  ) alone  (  ) children   ( X ) spouse   (  ) parents  (  ) other    Substance Use (street drugs): (  ) never used  ( X  ) other: heroin user, per detox note in 04/10--> heroin IV, 15 bags daily since age of 17, last use per patient 3 weeks ago--> now in methadone program  Tobacco Usage:  (   ) never smoked   (   ) former smoker   ( X  ) current smoker  (  36   ) pack year  (        ) last cigarette date  Alcohol Usage: before 2 pints of vodka daily and 1 case of beer daily--> now pt cut back to 6-7 beers daily

## 2018-06-06 NOTE — H&P ADULT - ASSESSMENT
46 yo M w/ h/o HIV on HAART therapy, liver cirrhosis/liver carcinoma, Hepatitis C infection s/p treatment, alcohol abuse, withdrawal seizures in past, h/o heroin abuse-on methadone program since 2 weeks now  sent in Dr Reilly from his office for therapeutic paracentesis. pt has been having progressive abdominal distension with abdominal pain for about a month. Since past few days he has been having difficulty ambulating and SOB on exertion so Dr Reilly sent him in for therapeutic para. He also c/o nausea/vomiting, on/off fever, abdominal pain since being diagnosed with cancer but worse for last month. In ED, 3 L of ascitic fluid was removed and pt was supposed to be d/luis from ED but due to low sodium on BMP, pt is being admitted.     VS in ED stable    A & P:    # Chronic moderate hyponatremia likely 2/2 cirrhosis   - hard to tell if pt has symptoms as pt has chronic nausea/vomiting, fatigue, lethargy since 2-3 months due to his cancer  - admit to medicine  - recheck level in AM  - fluid restriction to less than 1 L per day  - nephrology evaluation  - check urine osmolality urine sodium, serum osmolality  - add lasix spironolactone regimen    # Ascites, b/l LE edema 2/2 cirrhosis/liver cancer  - s/p paracentesis in ED--> 3 L was removed, no tests were sent  - pt does have c/o nausea/vomiting, abdominal pain, fever/chills, + abdominal tenderness but all these symptoms appears to be chronic--> if pt spikes fever while being in the hospital or develops confusion, worsening abdominal pain--> consider ruling out SBP  - start the patient on spironolactone/lasix regimen as it can prevent ascites in future    # h/o HIV--> last CD4 count 152 from 05/18 and HIV RNA undetectable  --> pt has been taking HAART on/off for last month due to abdominal pain  --> c/w home regimen    # h/o hepatocellular ca--> pt is being considered for therapy by Dr Reilly  - OP f/u with hem/onc    # Alcohol abuse disorder  - last drink yesterday 4 pm   - CIWA 9 on my assessment--> start ativan taper protocol po in light of high CIWA and h/o withdrawal seizure in past  - serial CIWA scores, monitor electrolytes  - start IVF w/ folate, multivitamin, thiamine    # h/o heroin abuse on methadone   - per pt facility no is [phone]--> called them to confirm the dose but they are unable to confirm at this time--> said to call during office hours  [facility]  - monitor for withdrawal symptoms, consider BZD or clonidine as needed    # h/o latent TB--> check quantiferon test    # DVT Px--> lovenox    # Code status- full code    # Dispo plan to home, if cleared by nephro and ID 46 yo M w/ h/o HIV on HAART therapy, liver cirrhosis/liver carcinoma, Hepatitis C infection s/p treatment, alcohol abuse, withdrawal seizures in past, h/o heroin abuse-on methadone program since 2 weeks now  sent in Dr Reilly from his office for therapeutic paracentesis. pt has been having progressive abdominal distension with abdominal pain for about a month. Since past few days he has been having difficulty ambulating and SOB on exertion so Dr Reilly sent him in for therapeutic para. He also c/o nausea/vomiting, on/off fever, abdominal pain since being diagnosed with cancer but worse for last month. In ED, 3 L of ascitic fluid was removed and pt was supposed to be d/luis from ED but due to low sodium on BMP, pt is being admitted.     VS in ED stable    A & P:    # Chronic moderate hyponatremia likely 2/2 cirrhosis   - hard to tell if pt has symptoms as pt has chronic nausea/vomiting, fatigue, lethargy since 2-3 months due to his cancer  - admit to medicine  - recheck level in AM  - fluid restriction to less than 1 L per day  - nephrology evaluation  - check urine osmolality urine sodium, serum osmolality  - add lasix spironolactone regimen    # Ascites, b/l LE edema 2/2 cirrhosis/liver cancer  - s/p paracentesis in ED--> 3 L was removed, no tests were sent  - pt does have c/o nausea/vomiting, abdominal pain, fever/chills, + abdominal tenderness but all these symptoms appears to be chronic--> if pt spikes fever while being in the hospital or develops confusion, worsening abdominal pain--> consider ruling out SBP  - start the patient on spironolactone/lasix regimen as it can prevent ascites in future    # h/o HIV--> last CD4 count 152 from 05/18 and HIV RNA undetectable  --> pt has been taking HAART on/off for last month due to abdominal pain  --> c/w home regimen    # h/o hepatocellular ca--> pt is being considered for therapy by Dr Reilly  - OP f/u with hem/onc  - inpatient pain management c/s with Dr Mullen for chronic abdominal pain and goals of care discussion    # Alcohol abuse disorder  - last drink yesterday 4 pm   - CIWA 9 on my assessment--> start IV ativan taper protocol in light of high CIWA and h/o withdrawal seizure in past  - serial CIWA scores, monitor electrolytes  - folate, multivitamin, thiamine    # h/o heroin abuse on methadone   - per pt facility no is [phone]--> called them to confirm the dose but they are unable to confirm at this time--> said to call during office hours  [facility]  - monitor for withdrawal symptoms, consider BZD or clonidine as needed    # h/o latent TB--> check quantiferon test    # DVT Px--> lovenox    # Code status- full code    # Dispo plan to home, if cleared by nephro and ID 48 yo M w/ h/o HIV on HAART therapy, liver cirrhosis/liver carcinoma, Hepatitis C infection s/p treatment, alcohol abuse, withdrawal seizures in past, h/o heroin abuse-on methadone program since 2 weeks now  sent in Dr Reilly from his office for therapeutic paracentesis. pt has been having progressive abdominal distension with abdominal pain for about a month. Since past few days he has been having difficulty ambulating and SOB on exertion so Dr Reilly sent him in for therapeutic para. He also c/o nausea/vomiting, on/off fever, abdominal pain since being diagnosed with cancer but worse for last month. In ED, 3 L of ascitic fluid was removed and pt was supposed to be d/luis from ED but due to low sodium on BMP, pt is being admitted.     VS in ED stable    A & P:    # Chronic moderate hyponatremia likely 2/2 cirrhosis   - hard to tell if pt has symptoms as pt has chronic nausea/vomiting, fatigue, lethargy since 2-3 months due to his cancer  - admit to medicine  - recheck level in AM  - fluid restriction to less than 1 L per day  - nephrology evaluation  - check urine osmolality urine sodium, serum osmolality  - add lasix spironolactone regimen    # Ascites, b/l LE edema 2/2 cirrhosis/liver cancer  - s/p paracentesis in ED--> 3 L was removed, no tests were sent  - pt does have c/o nausea/vomiting, abdominal pain, fever/chills, + abdominal tenderness but all these symptoms appears to be chronic--> if pt spikes fever while being in the hospital or develops confusion, worsening abdominal pain--> consider ruling out SBP  - start the patient on spironolactone/lasix regimen as it can prevent ascites in future    # h/o HIV--> last CD4 count 152 from 05/18 and HIV RNA undetectable  --> pt has been taking HAART on/off for last month due to abdominal pain  --> c/w home regimen  --> although CD$ is less than 200--> hold off bactrim px as pt has undetectable viral RNA copies    # h/o hepatocellular ca--> pt is being considered for therapy by Dr Reilly  - OP f/u with hem/onc  - inpatient pain management c/s with Dr Mullen for chronic abdominal pain and goals of care discussion    # Alcohol abuse disorder  - last drink yesterday 4 pm   - CIWA 9 on my assessment--> start IV ativan taper protocol in light of high CIWA and h/o withdrawal seizure in past  - serial CIWA scores, monitor electrolytes, VS  - folate, multivitamin, thiamine    # h/o heroin abuse on methadone   - per pt facility no is [phone]--> called them to confirm the dose but they are unable to confirm at this time--> said to call during office hours  [facility]  - monitor for withdrawal symptoms, consider BZD or clonidine as needed    # h/o latent TB--> check quantiferon test    # DVT Px--> lovenox    # Code status- full code    # Dispo plan to home, if cleared by nephro and ID 46 yo M w/ h/o HIV on HAART therapy, liver cirrhosis/liver carcinoma, Hepatitis C infection s/p treatment, alcohol abuse, withdrawal seizures in past, h/o heroin abuse-on methadone program since 2 weeks now  sent in Dr Reilly from his office for therapeutic paracentesis. pt has been having progressive abdominal distension with abdominal pain for about a month. Since past few days he has been having difficulty ambulating and SOB on exertion so Dr Reilly sent him in for therapeutic para. He also c/o nausea/vomiting, on/off fever, abdominal pain since being diagnosed with cancer but worse for last month. In ED, 3 L of ascitic fluid was removed and pt was supposed to be d/luis from ED but due to low sodium on BMP, pt is being admitted.     VS in ED stable    A & P:  # R/O SBP. Needs a diagnostic and therapeutic paracentesis  # Chronic moderate hyponatremia likely 2/2 cirrhosis   - fluid restriction to less than 1 L per day  - nephrology evaluation  - check urine osmolality urine sodium, serum osmolality  - add lasix / spironolactone regimen.  Blood cultures.   Rocephin 2 gm iv q24h  ART continue.    # Ascites, b/l LE edema 2/2 cirrhosis/liver cancer  - s/p paracentesis in ED--> 3 L was removed, no tests were sent  - pt does have c/o nausea/vomiting, abdominal pain, fever/chills, + abdominal tenderness but all these symptoms appears to be chronic--> if pt spikes fever while being in the hospital or develops confusion, worsening abdominal pain--> consider ruling out SBP  - start the patient on spironolactone/lasix regimen as it can prevent ascites in future    # h/o HIV--> last CD4 count 152 from 05/18 and HIV RNA undetectable  --> pt has been taking HAART on/off for last month due to abdominal pain  --> c/w home regimen  --> although CD$ is less than 200--> hold off bactrim px as pt has undetectable viral RNA copies    # h/o hepatocellular ca--> pt is being considered for therapy by Dr Reilly  - OP f/u with hem/onc  - inpatient pain management c/s with Dr Mullen for chronic abdominal pain and goals of care discussion    # Alcohol abuse disorder  - last drink yesterday 4 pm   - CIWA 9 on my assessment--> start IV ativan taper protocol in light of high CIWA and h/o withdrawal seizure in past  - serial CIWA scores, monitor electrolytes, VS  - folate, multivitamin, thiamine    # h/o heroin abuse on methadone   - per pt facility no is [phone]--> called them to confirm the dose but they are unable to confirm at this time--> said to call during office hours  - sign out given to [facility]  - monitor for withdrawal symptoms, consider BZD or clonidine as needed    # h/o latent TB--> check quantiferon test    # DVT Px--> lovenox    # Code status- full code    # Dispo plan to home, if cleared by nephro and ID

## 2018-06-06 NOTE — H&P ADULT - NSHPLABSRESULTS_GEN_ALL_CORE
11.8   10.68 )-----------( 200      ( 05 Jun 2018 20:43 )             36.0       06-05    123<L>  |  80<L>  |  7<L>  ----------------------------<  70  5.5<H>   |  25  |  0.6<L>    Ca    8.4<L>      05 Jun 2018 20:43    TPro  7.6  /  Alb  2.8<L>  /  TBili  1.3<H>  /  DBili  0.4<H>  /  AST  466<H>  /  ALT  135<H>  /  AlkPhos  279<H>  06-05        PT/INR - ( 05 Jun 2018 20:43 )   PT: 14.80 sec;   INR: 1.36 ratio      PTT - ( 05 Jun 2018 20:43 )  PTT:26.5 sec

## 2018-06-06 NOTE — CONSULT NOTE ADULT - ASSESSMENT
Patient is a 46 YO male with pmh of HIV, liver cirrhosis/carcinoma, hep c infection s/p tx, alcohol abuse, heroin abuse here for therapeutic paracentesis and was found to be hyponatremic    ·	Hyponatremia most likely hypervolemic hyperosmolar in the setting of liver cirrhosis   ·	c/w diuresis   ·	f/u Molly, urine osmo, urine Cr, pending  ·	BMP in AM  ·	will follow  ·	Liver cirrhosis/carcinoma most likely secondary to hep c infection and alcohol abuse   ·	HIV on HAART therapy   ·	Alcohol abuse  ·	Heroin abuse     Please see attending consult note

## 2018-06-06 NOTE — CONSULT NOTE ADULT - ASSESSMENT
Patient is a 46 YO male with pmh of HIV, liver cirrhosis/carcinoma, hep c infection s/p tx, alcohol abuse, heroin abuse here for therapeutic paracentesis and was found to be hyponatremic    ·	Hyponatremia most likely hypervolemic hyperosmolar in the setting of liver cirrhosis   ·	c/w diuresis lasix and aldactone as above serum sodium better   ·	f/u Molly, urine osmo, urine Cr, serum osmolarity  ·	BMP in AM  ·	will follow  ·	Liver cirrhosis/carcinoma most likely secondary to hep c infection and alcohol abuse   ·	consider paracentesis again, avoid large volume paracentesis   ·	HIV on HAART therapy

## 2018-06-06 NOTE — H&P ADULT - HISTORY OF PRESENT ILLNESS
46 yo M w/ h/o HIV on HAART therapy, liver cirrhosis/liver carcinoma, Hepatitis C infection s/p treatment, alcohol abuse, withdrawal seizures in past, h/o heroin abuse-on methadone program since 2 weeks now  sent in Dr Reilly from his office for therapeutic paracentesis.    Pt went to see Dr Reilly for his liver cancer. He has been having progressive abdominal distention for about 1 month. It is associated with abdominal pain, nausea/vomiting, sometimes fever/chills, b/l LE edema. He has been having difficulty walking around due to the belly distension so Dr Reilly thought the paracentesis would help him and sent him here. The abdominal pain is manly in upper abdomen, severe per pt, per wife it chronic but getting worse. He is going to see pain management on Friday. He has been having nausea/vomiting daily for past few months, yesterday 4 times, non-bloody. He is also having fever/chills on and off for about a month per wife. Wife also mentioned him being a little confused during day. On my assessment, pt is AAO*3.      In ED, 3 L of ascitic fluid was removed and pt was supposed to be d/luis from ED but due to low sodium on BMP, pt is being admitted.     pt denies any dizziness, numbness.

## 2018-06-06 NOTE — H&P ADULT - PMH
EtOH dependence    Hepatitis-C  s/p treatment  HIV (human immunodeficiency virus infection)  on HAART  Nicotine dependence    PPD+ (purified protein derivative positive)

## 2018-06-06 NOTE — CONSULT NOTE ADULT - SUBJECTIVE AND OBJECTIVE BOX
NEPHROLOGY CONSULTATION NOTE    Patient is a 47 year old male with past medical history of HIV on HAART therapy (CD4 152), liver cirrhosis/carcinoma, hepatitis C infection s/p treatment, alcohol abuse, heroin abuse no methadone treatment who was sent by Dr Reilly, oncologist, for therapeutic paracentesis. Patient complained of nausea, nonbloody, nonbilious vomiting and abdominal distention for the past month. Patient also reported on and off fever and chills x 1 month.     In the ED 3L of ascitic fluid was removed. Patient was to be discharged but was found to be hyponatremic, serum Na 123.     Upon ROS patient currently denies fever, chills, nausea, vomiting, diarrhea, constipation or changes in urination.     PAST MEDICAL & SURGICAL HISTORY:  PPD+ (purified protein derivative positive)  Hepatitis-C: s/p treatment  Nicotine dependence  HIV (human immunodeficiency virus infection): on HAART  EtOH dependence  H/O resection of liver: partial    Allergies:  No Known Allergies    Home Medications Reviewed  Hospital Medications:   MEDICATIONS  (STANDING):  darunavir 600 milliGRAM(s) Oral two times a day  dronabinol 10 milliGRAM(s) Oral two times a day  enoxaparin Injectable 30 milliGRAM(s) SubCutaneous daily  etravirine 200 milliGRAM(s) Oral two times a day after meals  folic acid 1 milliGRAM(s) Oral daily  furosemide    Tablet 40 milliGRAM(s) Oral daily  LORazepam   Injectable 2 milliGRAM(s) IV Push every 4 hours  LORazepam   Injectable   IV Push   magnesium sulfate  IVPB 2 Gram(s) IV Intermittent once  methadone    Tablet 110 milliGRAM(s) Oral daily  multivitamin/minerals 1 Tablet(s) Oral daily  nicotine - 21 mG/24Hr(s) Patch 1 patch Transdermal daily  raltegravir Tablet 400 milliGRAM(s) Oral two times a day  ritonavir Tablet 100 milliGRAM(s) Oral two times a day  spironolactone 100 milliGRAM(s) Oral daily  thiamine 100 milliGRAM(s) Oral daily      SOCIAL HISTORY:  Denies ETOH,Smoking,   FAMILY HISTORY:  No pertinent family history in first degree relatives        REVIEW OF SYSTEMS:  CONSTITUTIONAL: No weakness, fevers or chills  EYES/ENT: No visual changes;  No vertigo or throat pain   NECK: No pain or stiffness  RESPIRATORY: No cough, wheezing, hemoptysis; No shortness of breath  CARDIOVASCULAR: No chest pain or palpitations.  GASTROINTESTINAL: No abdominal or epigastric pain. No nausea, vomiting, or hematemesis; No diarrhea or constipation. No melena or hematochezia.  GENITOURINARY: No dysuria, frequency, foamy urine, urinary urgency, incontinence or hematuria  NEUROLOGICAL: No numbness or weakness  SKIN: No itching, burning, rashes, or lesions   VASCULAR: No bilateral lower extremity edema.   All other review of systems is negative unless indicated above.    VITALS:  T(F): 96.7 (06-06-18 @ 12:55), Max: 98 (06-05-18 @ 19:03)  HR: 107 (06-06-18 @ 12:55)  BP: 110/75 (06-06-18 @ 12:55)  RR: 20 (06-06-18 @ 12:55)  SpO2: 95% (06-06-18 @ 10:26)    06-06 @ 07:01  -  06-06 @ 15:32  --------------------------------------------------------  IN: 0 mL / OUT: 1 mL / NET: -1 mL      Height (cm): 172.72 (06-06 @ 04:44)  Weight (kg): 71 (06-06 @ 04:44)  BMI (kg/m2): 23.8 (06-06 @ 04:44)  BSA (m2): 1.84 (06-06 @ 04:44)      I&O's Detail    06 Jun 2018 07:01  -  06 Jun 2018 15:32  --------------------------------------------------------  IN:  Total IN: 0 mL    OUT:    Voided: 1 mL  Total OUT: 1 mL    Total NET: -1 mL            PHYSICAL EXAM:  Constitutional: NAD  HEENT: anicteric sclera, oropharynx clear, MMM  Neck: No JVD  Respiratory: CTAB, no wheezes, rales or rhonchi  Cardiovascular: S1, S2, RRR  Gastrointestinal: rigid, distended, nontender, bowel sounds present, caput medusa present  Extremities: No cyanosis or clubbing. No peripheral edema  Neurological: A/O x 3, no focal deficits  Psychiatric: Normal mood, normal affect  : No CVA tenderness. No rivera.   Skin: No rashes  Vascular Access: in place    LABS:  06-06    125<L>  |  86<L>  |  7<L>  ----------------------------<  69<L>  4.3   |  23  |  0.5<L>    Ca    8.1<L>      06 Jun 2018 11:55  Phos  2.9     06-06  Mg     1.6     06-06    TPro  6.5  /  Alb  2.6<L>  /  TBili  1.4<H>  /  DBili      /  AST  371<H>  /  ALT  127<H>  /  AlkPhos  247<H>  06-06    Creatinine Trend: 0.5 <--, 0.6 <--                        11.4   8.05  )-----------( 153      ( 06 Jun 2018 11:55 )             34.9     Urine Studies: pending    RADIOLOGY & ADDITIONAL STUDIES: NEPHROLOGY CONSULTATION NOTE    Patient is a 47 year old male with past medical history of HIV on HAART therapy (CD4 152), liver cirrhosis/carcinoma, hepatitis C infection s/p treatment, alcohol abuse, heroin abuse no methadone treatment who was sent by Dr Reilly, oncologist, for therapeutic paracentesis. Patient complained of nausea, nonbloody, nonbilious vomiting and abdominal distention for the past month. Patient also reported on and off fever and chills x 1 month.     In the ED 3L of ascitic fluid was removed. Patient was to be discharged but was found to be hyponatremic, serum Na 123.     Upon ROS patient currently denies fever, chills, nausea, vomiting, diarrhea, constipation or changes in urination.     PAST MEDICAL & SURGICAL HISTORY:  PPD+ (purified protein derivative positive)  Hepatitis-C: s/p treatment  Nicotine dependence  HIV (human immunodeficiency virus infection): on HAART  EtOH dependence  H/O resection of liver: partial    Allergies:  No Known Allergies    Home Medications Reviewed  Hospital Medications:   MEDICATIONS  (STANDING):  darunavir 600 milliGRAM(s) Oral two times a day  dronabinol 10 milliGRAM(s) Oral two times a day  enoxaparin Injectable 30 milliGRAM(s) SubCutaneous daily  etravirine 200 milliGRAM(s) Oral two times a day after meals  folic acid 1 milliGRAM(s) Oral daily  furosemide    Tablet 40 milliGRAM(s) Oral daily  LORazepam   Injectable 2 milliGRAM(s) IV Push every 4 hours  LORazepam   Injectable   IV Push   magnesium sulfate  IVPB 2 Gram(s) IV Intermittent once  methadone    Tablet 110 milliGRAM(s) Oral daily  multivitamin/minerals 1 Tablet(s) Oral daily  nicotine - 21 mG/24Hr(s) Patch 1 patch Transdermal daily  raltegravir Tablet 400 milliGRAM(s) Oral two times a day  ritonavir Tablet 100 milliGRAM(s) Oral two times a day  spironolactone 100 milliGRAM(s) Oral daily  thiamine 100 milliGRAM(s) Oral daily      SOCIAL HISTORY:  Denies ETOH,Smoking,   FAMILY HISTORY:  No pertinent family history in first degree relatives        REVIEW OF SYSTEMS:  CONSTITUTIONAL: No weakness, fevers or chills  EYES/ENT: No visual changes;  No vertigo or throat pain   NECK: No pain or stiffness  RESPIRATORY: No cough, wheezing, hemoptysis; No shortness of breath  CARDIOVASCULAR: No chest pain or palpitations.  GASTROINTESTINAL: No abdominal or epigastric pain. No nausea, vomiting, or hematemesis; No diarrhea or constipation. No melena or hematochezia.  GENITOURINARY: No dysuria, frequency, foamy urine, urinary urgency, incontinence or hematuria  NEUROLOGICAL: No numbness or weakness  SKIN: No itching, burning, rashes, or lesions   VASCULAR: No bilateral lower extremity edema.   All other review of systems is negative unless indicated above.    VITALS:  T(F): 96.7 (06-06-18 @ 12:55), Max: 98 (06-05-18 @ 19:03)  HR: 107 (06-06-18 @ 12:55)  BP: 110/75 (06-06-18 @ 12:55)  RR: 20 (06-06-18 @ 12:55)  SpO2: 95% (06-06-18 @ 10:26)    06-06 @ 07:01  -  06-06 @ 15:32  --------------------------------------------------------  IN: 0 mL / OUT: 1 mL / NET: -1 mL      Height (cm): 172.72 (06-06 @ 04:44)  Weight (kg): 71 (06-06 @ 04:44)  BMI (kg/m2): 23.8 (06-06 @ 04:44)  BSA (m2): 1.84 (06-06 @ 04:44)      I&O's Detail    06 Jun 2018 07:01  -  06 Jun 2018 15:32  --------------------------------------------------------  IN:  Total IN: 0 mL    OUT:    Voided: 1 mL  Total OUT: 1 mL    Total NET: -1 mL            PHYSICAL EXAM:  Constitutional: NAD  HEENT: anicteric sclera, oropharynx clear, MMM  Neck: No JVD  Respiratory: CTAB, no wheezes, rales or rhonchi  Cardiovascular: S1, S2, RRR  Gastrointestinal: rigid, distended, nontender, bowel sounds present, caput medusa present  Extremities: No cyanosis or clubbing. No peripheral edema  Neurological: A/O x 3, no focal deficits  Psychiatric: Normal mood, normal affect  : No CVA tenderness. No rivera.   Skin: No rashes  Vascular Access: in place    LABS:  06-06    125<L>  |  86<L>  |  7<L>  ----------------------------<  69<L>  4.3   |  23  |  0.5<L>    SODIUM TREND:  Sodium 125 [06-06 @ 11:55]  Sodium 123 [06-05 @ 20:43]    Ca    8.1<L>      06 Jun 2018 11:55  Phos  2.9     06-06  Mg     1.6     06-06    TPro  6.5  /  Alb  2.6<L>  /  TBili  1.4<H>  /  DBili      /  AST  371<H>  /  ALT  127<H>  /  AlkPhos  247<H>  06-06    Creatinine Trend: 0.5 <--, 0.6 <--                        11.4   8.05  )-----------( 153      ( 06 Jun 2018 11:55 )             34.9     Urine Studies: pending    RADIOLOGY & ADDITIONAL STUDIES:

## 2018-06-07 DIAGNOSIS — C22.9 MALIGNANT NEOPLASM OF LIVER, NOT SPECIFIED AS PRIMARY OR SECONDARY: ICD-10-CM

## 2018-06-07 LAB
4/8 RATIO: 1.3 RATIO — SIGNIFICANT CHANGE UP (ref 1.2–3.4)
ABS CD8: 109 /UL — LOW (ref 206–494)
ALBUMIN SERPL ELPH-MCNC: 2.8 G/DL — LOW (ref 3.5–5.2)
ALP SERPL-CCNC: 262 U/L — HIGH (ref 30–115)
ALT FLD-CCNC: 216 U/L — HIGH (ref 0–41)
AMMONIA BLD-MCNC: 91 UMOL/L — HIGH (ref 11–55)
ANION GAP SERPL CALC-SCNC: 19 MMOL/L — HIGH (ref 7–14)
AST SERPL-CCNC: 639 U/L — HIGH (ref 0–41)
B PERT IGG+IGM PNL SER: (no result)
BILIRUB SERPL-MCNC: 1.3 MG/DL — HIGH (ref 0.2–1.2)
BUN SERPL-MCNC: 9 MG/DL — LOW (ref 10–20)
CALCIUM SERPL-MCNC: 8.5 MG/DL — SIGNIFICANT CHANGE UP (ref 8.5–10.1)
CD16+CD56+ CELLS NFR BLD: 16 % — SIGNIFICANT CHANGE UP (ref 4–20)
CD16+CD56+ CELLS NFR SPEC: SIGNIFICANT CHANGE UP /UL (ref 89–385)
CD19 BLASTS SPEC-ACNC: 22 % — SIGNIFICANT CHANGE UP (ref 10–28)
CD19 BLASTS SPEC-ACNC: 97 /UL — SIGNIFICANT CHANGE UP (ref 72–502)
CD3 BLASTS SPEC-ACNC: 60 % — SIGNIFICANT CHANGE UP (ref 59–81)
CD3 BLASTS SPEC-ACNC: SIGNIFICANT CHANGE UP /UL (ref 800–2012)
CD4 %: SIGNIFICANT CHANGE UP % (ref 42–58)
CD8 %: 25 % — SIGNIFICANT CHANGE UP (ref 14–30)
CHLORIDE SERPL-SCNC: 81 MMOL/L — LOW (ref 98–110)
CHLORIDE UR-SCNC: 23 — SIGNIFICANT CHANGE UP
CO2 SERPL-SCNC: 25 MMOL/L — SIGNIFICANT CHANGE UP (ref 17–32)
COLOR FLD: SIGNIFICANT CHANGE UP
CREAT ?TM UR-MCNC: 151 MG/DL — SIGNIFICANT CHANGE UP
CREAT SERPL-MCNC: 0.6 MG/DL — LOW (ref 0.7–1.5)
FLUID INTAKE SUBSTANCE CLASS: SIGNIFICANT CHANGE UP
FLUID SEGMENTED GRANULOCYTES: SIGNIFICANT CHANGE UP %
GLUCOSE SERPL-MCNC: 139 MG/DL — HIGH (ref 70–99)
HCT VFR BLD CALC: 37.8 % — LOW (ref 42–52)
HGB BLD-MCNC: 12.1 G/DL — LOW (ref 14–18)
LYMPHOCYTES # FLD: SIGNIFICANT CHANGE UP
MAGNESIUM SERPL-MCNC: 2 MG/DL — SIGNIFICANT CHANGE UP (ref 1.8–2.4)
MCHC RBC-ENTMCNC: 27.4 PG — SIGNIFICANT CHANGE UP (ref 27–31)
MCHC RBC-ENTMCNC: 32 G/DL — SIGNIFICANT CHANGE UP (ref 32–37)
MCV RBC AUTO: 85.5 FL — SIGNIFICANT CHANGE UP (ref 80–94)
MONOS+MACROS # FLD: SIGNIFICANT CHANGE UP %
NRBC # BLD: 0 /100 WBCS — SIGNIFICANT CHANGE UP (ref 0–0)
OSMOLALITY UR: 433 MOS/KG — SIGNIFICANT CHANGE UP (ref 50–1400)
OTHER CELLS FLD MANUAL: SIGNIFICANT CHANGE UP %
PLATELET # BLD AUTO: 169 K/UL — SIGNIFICANT CHANGE UP (ref 130–400)
POTASSIUM SERPL-MCNC: 4.5 MMOL/L — SIGNIFICANT CHANGE UP (ref 3.5–5)
POTASSIUM SERPL-SCNC: 4.5 MMOL/L — SIGNIFICANT CHANGE UP (ref 3.5–5)
PROT SERPL-MCNC: 7.2 G/DL — SIGNIFICANT CHANGE UP (ref 6–8)
RBC # BLD: 4.42 M/UL — LOW (ref 4.7–6.1)
RBC # FLD: 15.6 % — HIGH (ref 11.5–14.5)
RCV VOL RI: 7000 /UL — HIGH (ref 0–5)
SODIUM SERPL-SCNC: 125 MMOL/L — LOW (ref 135–146)
SODIUM UR-SCNC: 20 MMOL/L — SIGNIFICANT CHANGE UP
T-CELL CD4 SUBSET PNL BLD: 147 /UL — LOW (ref 495–1312)
TOTAL NUCLEATED CELL COUNT, BODY FLUID: 1314 /UL — HIGH (ref 0–5)
TUBE TYPE: SIGNIFICANT CHANGE UP
UUN UR-MCNC: 725 MG/DL — SIGNIFICANT CHANGE UP
WBC # BLD: 11.32 K/UL — HIGH (ref 4.8–10.8)
WBC # FLD AUTO: 11.32 K/UL — HIGH (ref 4.8–10.8)

## 2018-06-07 RX ORDER — MORPHINE SULFATE 50 MG/1
2 CAPSULE, EXTENDED RELEASE ORAL EVERY 6 HOURS
Qty: 0 | Refills: 0 | Status: DISCONTINUED | OUTPATIENT
Start: 2018-06-07 | End: 2018-06-10

## 2018-06-07 RX ORDER — CEFTRIAXONE 500 MG/1
2 INJECTION, POWDER, FOR SOLUTION INTRAMUSCULAR; INTRAVENOUS ONCE
Qty: 0 | Refills: 0 | Status: COMPLETED | OUTPATIENT
Start: 2018-06-07 | End: 2018-06-07

## 2018-06-07 RX ORDER — LACTULOSE 10 G/15ML
20 SOLUTION ORAL
Qty: 0 | Refills: 0 | Status: DISCONTINUED | OUTPATIENT
Start: 2018-06-07 | End: 2018-06-08

## 2018-06-07 RX ORDER — CEFTRIAXONE 500 MG/1
INJECTION, POWDER, FOR SOLUTION INTRAMUSCULAR; INTRAVENOUS
Qty: 0 | Refills: 0 | Status: DISCONTINUED | OUTPATIENT
Start: 2018-06-07 | End: 2018-06-07

## 2018-06-07 RX ORDER — LACTULOSE 10 G/15ML
20 SOLUTION ORAL THREE TIMES A DAY
Qty: 0 | Refills: 0 | Status: DISCONTINUED | OUTPATIENT
Start: 2018-06-07 | End: 2018-06-07

## 2018-06-07 RX ORDER — TRAMADOL HYDROCHLORIDE 50 MG/1
50 TABLET ORAL
Qty: 0 | Refills: 0 | Status: DISCONTINUED | OUTPATIENT
Start: 2018-06-07 | End: 2018-06-12

## 2018-06-07 RX ORDER — LACTULOSE 10 G/15ML
20 SOLUTION ORAL
Qty: 0 | Refills: 0 | Status: DISCONTINUED | OUTPATIENT
Start: 2018-06-07 | End: 2018-06-07

## 2018-06-07 RX ORDER — CEFTRIAXONE 500 MG/1
2 INJECTION, POWDER, FOR SOLUTION INTRAMUSCULAR; INTRAVENOUS EVERY 24 HOURS
Qty: 0 | Refills: 0 | Status: DISCONTINUED | OUTPATIENT
Start: 2018-06-07 | End: 2018-06-12

## 2018-06-07 RX ORDER — MORPHINE SULFATE 50 MG/1
2 CAPSULE, EXTENDED RELEASE ORAL ONCE
Qty: 0 | Refills: 0 | Status: DISCONTINUED | OUTPATIENT
Start: 2018-06-07 | End: 2018-06-07

## 2018-06-07 RX ADMIN — RALTEGRAVIR 400 MILLIGRAM(S): 400 TABLET, FILM COATED ORAL at 17:12

## 2018-06-07 RX ADMIN — SPIRONOLACTONE 100 MILLIGRAM(S): 25 TABLET, FILM COATED ORAL at 06:35

## 2018-06-07 RX ADMIN — LACTULOSE 20 GRAM(S): 10 SOLUTION ORAL at 22:53

## 2018-06-07 RX ADMIN — LACTULOSE 20 GRAM(S): 10 SOLUTION ORAL at 17:12

## 2018-06-07 RX ADMIN — DARUNAVIR 600 MILLIGRAM(S): 75 TABLET, FILM COATED ORAL at 17:14

## 2018-06-07 RX ADMIN — TRAMADOL HYDROCHLORIDE 50 MILLIGRAM(S): 50 TABLET ORAL at 04:48

## 2018-06-07 RX ADMIN — MORPHINE SULFATE 2 MILLIGRAM(S): 50 CAPSULE, EXTENDED RELEASE ORAL at 19:50

## 2018-06-07 RX ADMIN — Medication 1.5 MILLIGRAM(S): at 14:28

## 2018-06-07 RX ADMIN — Medication 400 MILLIGRAM(S): at 05:08

## 2018-06-07 RX ADMIN — MORPHINE SULFATE 2 MILLIGRAM(S): 50 CAPSULE, EXTENDED RELEASE ORAL at 23:55

## 2018-06-07 RX ADMIN — RITONAVIR 100 MILLIGRAM(S): 100 TABLET, FILM COATED ORAL at 17:14

## 2018-06-07 RX ADMIN — Medication 1.5 MILLIGRAM(S): at 09:08

## 2018-06-07 RX ADMIN — Medication 1.5 MILLIGRAM(S): at 01:10

## 2018-06-07 RX ADMIN — LACTULOSE 20 GRAM(S): 10 SOLUTION ORAL at 12:28

## 2018-06-07 RX ADMIN — MORPHINE SULFATE 2 MILLIGRAM(S): 50 CAPSULE, EXTENDED RELEASE ORAL at 23:30

## 2018-06-07 RX ADMIN — LACTULOSE 20 GRAM(S): 10 SOLUTION ORAL at 19:55

## 2018-06-07 RX ADMIN — Medication 1.5 MILLIGRAM(S): at 05:37

## 2018-06-07 RX ADMIN — CEFTRIAXONE 100 GRAM(S): 500 INJECTION, POWDER, FOR SOLUTION INTRAMUSCULAR; INTRAVENOUS at 08:26

## 2018-06-07 RX ADMIN — Medication 1 PATCH: at 06:49

## 2018-06-07 RX ADMIN — Medication 1 TABLET(S): at 12:28

## 2018-06-07 RX ADMIN — Medication 1.5 MILLIGRAM(S): at 17:16

## 2018-06-07 RX ADMIN — Medication 100 MILLIGRAM(S): at 12:28

## 2018-06-07 RX ADMIN — Medication 10 MILLIGRAM(S): at 17:12

## 2018-06-07 RX ADMIN — Medication 10 MILLIGRAM(S): at 06:30

## 2018-06-07 RX ADMIN — ETRAVIRINE 200 MILLIGRAM(S): 200 TABLET ORAL at 17:17

## 2018-06-07 RX ADMIN — TRAMADOL HYDROCHLORIDE 50 MILLIGRAM(S): 50 TABLET ORAL at 05:15

## 2018-06-07 RX ADMIN — Medication 1 PATCH: at 02:36

## 2018-06-07 RX ADMIN — Medication 1.5 MILLIGRAM(S): at 21:09

## 2018-06-07 RX ADMIN — Medication 40 MILLIGRAM(S): at 06:28

## 2018-06-07 RX ADMIN — LACTULOSE 20 GRAM(S): 10 SOLUTION ORAL at 16:40

## 2018-06-07 RX ADMIN — ENOXAPARIN SODIUM 30 MILLIGRAM(S): 100 INJECTION SUBCUTANEOUS at 12:29

## 2018-06-07 RX ADMIN — METHADONE HYDROCHLORIDE 110 MILLIGRAM(S): 40 TABLET ORAL at 12:28

## 2018-06-07 RX ADMIN — Medication 1 MILLIGRAM(S): at 12:28

## 2018-06-07 RX ADMIN — MORPHINE SULFATE 2 MILLIGRAM(S): 50 CAPSULE, EXTENDED RELEASE ORAL at 06:30

## 2018-06-07 NOTE — PROGRESS NOTE ADULT - ASSESSMENT
48 yo M w/ h/o HIV on HAART therapy, liver cirrhosis/liver carcinoma, Hepatitis C infection s/p treatment, alcohol abuse, withdrawal seizures in past, h/o heroin abuse on methadone program sent in Dr Reilly from his office for therapeutic paracentesis. pt has been having progressive abdominal distension with abdominal pain for about a month. Since past few days he has been having difficulty ambulating and SOB on exertion so Dr Reilly sent him in for therapeutic para. In ED, 3 L of ascitic fluid was removed and pt was supposed to be d/luis from ED but due to low sodium on BMP, he was admitted to medicine. His problems are being managed as follows --     #Abdominal pain, fever, ascites - r/o SBP   - s/p paracentesis in ED,3 L was removed, no tests were sent  - IR c/s for diagnostic para  - f/u ascitic fluid studies including cx and path  - started rocephin  - f/u bcx  - ID following appreciate recs    #AMS, 2/2 hepatic encephalopathy vs polypharmacy (on ativan ciwa protocol and methadone)  - increased lactulose dose, titrate to 2 soft BMs per day  - f/u ammonia  - started rifaximin    #Chronic moderate hyponatremia likely 2/2 cirrhosis vs HIV related SIADH  - fluid restriction to less than 1 L per day  - nephrology c/s appreciate recs  - c/w lasix and aldactone     #HIV, last CD4 count 152 from 05/18 and HIV RNA undetectable  - c/w HAART  therapy    #Metastatic hepatocellular ca s/p L lobe resection  - pt is being considered for therapy by Dr Reilly  - OP f/u with hem/onc  - pain management c/s pending     #Alcohol abuse  - serial CIWA scores, monitor electrolytes, VS  - folate, multivitamin, thiamine  - ativan protocol    #h/o heroin abuse on methadone   - c/w methadone 110 mg, dose verified with [facility]    #DVT ppx: lovenox    #Diet: npo for now pending paracentesis w/ IR    #Dispo: pending SBP w/u , from home

## 2018-06-07 NOTE — PROGRESS NOTE ADULT - ASSESSMENT
EXCELLENT PGY 1 note.    Ammonia level.  Repeat paracentesis and send off fluid numbers and cultures.  Blood cultures.  Lactulose and Rifaximin  Fluid restriction.  Dr Gibson evaluation for nutritional needs.  Continue with Rocephin

## 2018-06-07 NOTE — PROGRESS NOTE ADULT - SUBJECTIVE AND OBJECTIVE BOX
JACINTO GAMEZ  47y, Male      OVERNIGHT EVENTS:    Wife at bedside. Lethargic. Non communicative.    VITALS:  T(F): 97.6, Max: 101.1 (06-06-18 @ 20:46)  HR: 114  BP: 135/95  RR: 20Vital Signs Last 24 Hrs  T(C): 36.4 (06 Jun 2018 22:30), Max: 38.4 (06 Jun 2018 20:46)  T(F): 97.6 (06 Jun 2018 22:30), Max: 101.1 (06 Jun 2018 20:46)  HR: 114 (07 Jun 2018 05:00) (107 - 121)  BP: 135/95 (07 Jun 2018 05:00) (110/75 - 135/95)  BP(mean): --  RR: 20 (07 Jun 2018 05:00) (18 - 20)  SpO2: 84% (07 Jun 2018 05:00) (84% - 96%)    TESTS & MEASUREMENTS:                        12.1   11.32 )-----------( 169      ( 07 Jun 2018 08:48 )             37.8     06-07    125<L>  |  81<L>  |  9<L>  ----------------------------<  139<H>  4.5   |  25  |  0.6<L>    Ca    8.5      07 Jun 2018 08:48  Phos  2.9     06-06  Mg     2.0     06-07    TPro  7.2  /  Alb  2.8<L>  /  TBili  1.3<H>  /  DBili  x   /  AST  639<H>  /  ALT  216<H>  /  AlkPhos  262<H>  06-07    LIVER FUNCTIONS - ( 07 Jun 2018 08:48 )  Alb: 2.8 g/dL / Pro: 7.2 g/dL / ALK PHOS: 262 U/L / ALT: 216 U/L / AST: 639 U/L / GGT: x                   RADIOLOGY & ADDITIONAL TESTS:    ANTIBIOTICS:  cefTRIAXone   IVPB 2 Gram(s) IV Intermittent every 24 hours  darunavir 600 milliGRAM(s) Oral two times a day  etravirine 200 milliGRAM(s) Oral two times a day after meals  raltegravir Tablet 400 milliGRAM(s) Oral two times a day  rifaximin 550 milliGRAM(s) Oral two times a day  ritonavir Tablet 100 milliGRAM(s) Oral two times a day

## 2018-06-07 NOTE — PROGRESS NOTE ADULT - SUBJECTIVE AND OBJECTIVE BOX
SUBJECTIVE:    Patient is a 47y old Male who presents with a chief complaint of hyponatremia (06 Jun 2018 03:34)    Currently admitted to medicine with the primary diagnosis of Hyponatremia     Today is hospital day 1d. This morning he is resting comfortably in bed and reports no new issues or overnight events.     PAST MEDICAL & SURGICAL HISTORY  PPD+ (purified protein derivative positive)  Hepatitis-C: s/p treatment  Nicotine dependence  HIV (human immunodeficiency virus infection): on HAART  EtOH dependence  H/O resection of liver: partial    SOCIAL HISTORY:  Negative for smoking/alcohol/drug use.     ALLERGIES:  No Known Allergies    MEDICATIONS:  STANDING MEDICATIONS  cefTRIAXone   IVPB 2 Gram(s) IV Intermittent every 24 hours  darunavir 600 milliGRAM(s) Oral two times a day  dronabinol 10 milliGRAM(s) Oral two times a day  enoxaparin Injectable 30 milliGRAM(s) SubCutaneous daily  etravirine 200 milliGRAM(s) Oral two times a day after meals  folic acid 1 milliGRAM(s) Oral daily  furosemide    Tablet 40 milliGRAM(s) Oral daily  lactulose Syrup 20 Gram(s) Oral five times a day  LORazepam   Injectable 1.5 milliGRAM(s) IV Push every 4 hours  LORazepam   Injectable   IV Push   methadone    Tablet 110 milliGRAM(s) Oral daily  multivitamin/minerals 1 Tablet(s) Oral daily  nicotine - 21 mG/24Hr(s) Patch 1 patch Transdermal daily  raltegravir Tablet 400 milliGRAM(s) Oral two times a day  rifaximin 400 milliGRAM(s) Oral every 8 hours  ritonavir Tablet 100 milliGRAM(s) Oral two times a day  spironolactone 100 milliGRAM(s) Oral daily  thiamine 100 milliGRAM(s) Oral daily    PRN MEDICATIONS  ibuprofen  Tablet 400 milliGRAM(s) Oral three times a day PRN  traMADol 50 milliGRAM(s) Oral two times a day PRN    VITALS:   T(F): 97.6  HR: 114  BP: 135/95  RR: 20  SpO2: 84%    LABS:                        11.4   8.05  )-----------( 153      ( 06 Jun 2018 11:55 )             34.9     06-06    124<L>  |  82<L>  |  8<L>  ----------------------------<  95  4.5   |  25  |  0.7    Ca    8.2<L>      06 Jun 2018 21:47  Phos  2.9     06-06  Mg     1.6     06-06    TPro  6.5  /  Alb  2.6<L>  /  TBili  1.4<H>  /  DBili  x   /  AST  371<H>  /  ALT  127<H>  /  AlkPhos  247<H>  06-06    PT/INR - ( 05 Jun 2018 20:43 )   PT: 14.80 sec;   INR: 1.36 ratio         PTT - ( 05 Jun 2018 20:43 )  PTT:26.5 sec        RADIOLOGY:  < from: CT Abdomen and Pelvis No Cont (06.07.18 @ 06:26) >  IMPRESSION:        Diffuse moderate volume ascites, which itself limits evaluation of   intra-abdominal organs without contrast administration. Nodefinite acute   process, however diffuse epigastric and periaortic nodularity is seen,   suspicious for metastatic disease in this patient with known   hepatocellular carcinoma; several of these nodules are indistinguishable   from what are instead likely varices.  Ascites appears to be simple fluid however SBP cannot be excluded given   the presentation of acute pain    < end of copied text >    PHYSICAL EXAM:  GEN: No acute distress  LUNGS: Clear to auscultation bilaterally   HEART: S1/S2 present. RRR.   ABD: firm and distended. no ttp.  Bowel sounds present  NEURO: AAOX3 lethargic but easily arousable

## 2018-06-07 NOTE — PROGRESS NOTE ADULT - SUBJECTIVE AND OBJECTIVE BOX
Nephrology progress note    Patient is seen and examined, events over the last 24 h noted.     Allergies:  No Known Allergies    Hospital Medications:   MEDICATIONS  (STANDING):  cefTRIAXone   IVPB 2 Gram(s) IV Intermittent every 24 hours  darunavir 600 milliGRAM(s) Oral two times a day  dronabinol 10 milliGRAM(s) Oral two times a day  enoxaparin Injectable 30 milliGRAM(s) SubCutaneous daily  etravirine 200 milliGRAM(s) Oral two times a day after meals  folic acid 1 milliGRAM(s) Oral daily  furosemide    Tablet 40 milliGRAM(s) Oral daily  lactulose Syrup 20 Gram(s) Oral five times a day  LORazepam   Injectable 1.5 milliGRAM(s) IV Push every 4 hours  LORazepam   Injectable   IV Push   methadone    Tablet 110 milliGRAM(s) Oral daily  multivitamin/minerals 1 Tablet(s) Oral daily  nicotine - 21 mG/24Hr(s) Patch 1 patch Transdermal daily  raltegravir Tablet 400 milliGRAM(s) Oral two times a day  rifaximin 550 milliGRAM(s) Oral two times a day  ritonavir Tablet 100 milliGRAM(s) Oral two times a day  spironolactone 100 milliGRAM(s) Oral daily  thiamine 100 milliGRAM(s) Oral daily        VITALS:  T(F): 97.3 (06-07-18 @ 12:37), Max: 101.1 (06-06-18 @ 20:46)  HR: 104 (06-07-18 @ 12:37)  BP: 102/67 (06-07-18 @ 12:37)  RR: 20 (06-07-18 @ 12:37)  SpO2: 84% (06-07-18 @ 05:00)  Wt(kg): --    06-06 @ 07:01  -  06-07 @ 07:00  --------------------------------------------------------  IN: 50 mL / OUT: 1 mL / NET: 49 mL    PHYSICAL EXAM:  Constitutional: NAD  HEENT: anicteric sclera, oropharynx clear, MMM  Neck: No JVD  Respiratory: CTAB, no wheezes, rales or rhonchi  Cardiovascular: S1, S2, RRR  Gastrointestinal: abdomen rigid, distended, bowel sounds present  Extremities: No cyanosis or clubbing. No peripheral edema  :  No rivera.   Skin: No rashes    LABS:  06-07    125<L>  |  81<L>  |  9<L>  ----------------------------<  139<H>  4.5   |  25  |  0.6<L>    Ca    8.5      07 Jun 2018 08:48  Phos  2.9     06-06  Mg     2.0     06-07    TPro  7.2  /  Alb  2.8<L>  /  TBili  1.3<H>  /  DBili      /  AST  639<H>  /  ALT  216<H>  /  AlkPhos  262<H>  06-07                          12.1   11.32 )-----------( 169      ( 07 Jun 2018 08:48 )             37.8       Urine Studies:    Osmolality, Random Urine: 433 mos/kg (06-07 @ 01:59)  Sodium, Random Urine: 20.0 mmoL/L (06-07 @ 01:58)  Creatinine, Random Urine: 151 mg/dL (06-07 @ 01:58)  Chloride, Random Urine: 23 (06-07 @ 01:58)    RADIOLOGY & ADDITIONAL STUDIES:

## 2018-06-07 NOTE — PROGRESS NOTE ADULT - SUBJECTIVE AND OBJECTIVE BOX
Interventional Radiology Brief- Operative Note    Procedure: right sided image guided paracentesis - diagnostic and therapeutic    Pre-Op Diagnosis: metastatic HCC, Hep C, HIV    Post-Op Diagnosis: metastatic HCC, Hep C, HIV     Attending: Darin  Physician Assistant: Macho    Anesthesia (type):  [ ] General Anesthesia  [ ] Sedation  [ ] Spinal Anesthesia  [ x ] Local/Regional    Contrast: none    Estimated Blood Loss: <5mL    Condition:   [ ] Critical  [ ] Serious  [ x ] Fair   [ ] Good    Findings/Follow up Plan of Care: 3.870L dominic color fluid removed    Specimens Removed: 160cc fluid removed to be sent for diagnostic testing - tests ordered by primary team    Implants: none    Complications: none    Condition/Disposition: no complications, return to floor     Please call Interventional Radiology [phone] with any questions, concerns, or issues.

## 2018-06-07 NOTE — PROGRESS NOTE ADULT - ASSESSMENT
Patient is a 48 YO male with pmh of HIV, liver cirrhosis/carcinoma, hep c infection s/p tx, alcohol abuse, heroin abuse here for therapeutic paracentesis and was found to be hyponatremic    ·	Hyponatremia most likely multifactorial - intravascular volume depletion, liver cirrhosis, low solute intake  ·	- low Urine sodium 20, high Urine Osm >400 c/w prerenal state  ·	would hold Lasix, start NGT feeds or give NS 50 cc/hr  ·	-s/p paracenthesis 2nd one today 3 L, SBP on ABx Rocephine    ·	Liver cirrhosis/carcinoma most likely secondary to hep c infection and alcohol abuse   ·	hem/onc f/u  ·	HIV on HAART therapy

## 2018-06-07 NOTE — PROGRESS NOTE ADULT - ASSESSMENT
Patient is a 46 YO male with pmh of HIV, liver cirrhosis/carcinoma, hep c infection s/p tx, alcohol abuse, heroin abuse here for therapeutic paracentesis and was found to be hyponatremic    ·	Hyponatremia most likely hypervolemic hyperosmolar in the setting of liver cirrhosis   ·	c/w diuresis lasix and aldactone as above serum sodium better   ·	urine lytes noted   ·	BMP in AM  ·	will follow  ·	Liver cirrhosis/carcinoma most likely secondary to hep c infection and alcohol abuse   ·	consider paracentesis again, avoid large volume paracentesis   ·	HIV on HAART therapy     Please see attending progress note

## 2018-06-08 LAB
ALBUMIN FLD-MCNC: 1.3 G/DL — SIGNIFICANT CHANGE UP
ALBUMIN SERPL ELPH-MCNC: 2.6 G/DL — LOW (ref 3.5–5.2)
ALP SERPL-CCNC: 265 U/L — HIGH (ref 30–115)
ALT FLD-CCNC: 279 U/L — HIGH (ref 0–41)
ANION GAP SERPL CALC-SCNC: 14 MMOL/L — SIGNIFICANT CHANGE UP (ref 7–14)
AST SERPL-CCNC: 765 U/L — HIGH (ref 0–41)
BILIRUB DIRECT SERPL-MCNC: 0.9 MG/DL — HIGH (ref 0–0.2)
BILIRUB INDIRECT FLD-MCNC: 0.4 MG/DL — SIGNIFICANT CHANGE UP (ref 0.2–1.2)
BILIRUB SERPL-MCNC: 1.3 MG/DL — HIGH (ref 0.2–1.2)
BUN SERPL-MCNC: 11 MG/DL — SIGNIFICANT CHANGE UP (ref 10–20)
CALCIUM SERPL-MCNC: 8.5 MG/DL — SIGNIFICANT CHANGE UP (ref 8.5–10.1)
CHLORIDE SERPL-SCNC: 87 MMOL/L — LOW (ref 98–110)
CO2 SERPL-SCNC: 26 MMOL/L — SIGNIFICANT CHANGE UP (ref 17–32)
CREAT SERPL-MCNC: 0.6 MG/DL — LOW (ref 0.7–1.5)
GLUCOSE FLD-MCNC: 113 MG/DL — SIGNIFICANT CHANGE UP
GLUCOSE SERPL-MCNC: 110 MG/DL — HIGH (ref 70–99)
GRAM STN FLD: SIGNIFICANT CHANGE UP
HCT VFR BLD CALC: 35 % — LOW (ref 42–52)
HGB BLD-MCNC: 11.4 G/DL — LOW (ref 14–18)
HIV-1 VIRAL LOAD RESULT: SIGNIFICANT CHANGE UP
HIV1 RNA # SERPL NAA+PROBE: SIGNIFICANT CHANGE UP
HIV1 RNA SERPL NAA+PROBE-ACNC: SIGNIFICANT CHANGE UP
HIV1 RNA SERPL NAA+PROBE-LOG#: SIGNIFICANT CHANGE UP LG COP/ML
LDH SERPL L TO P-CCNC: 231 U/L — SIGNIFICANT CHANGE UP
M TB TUBERC IFN-G BLD QL: (no result)
M TB TUBERC IFN-G BLD QL: 0 IU/ML — SIGNIFICANT CHANGE UP
M TB TUBERC IFN-G BLD QL: 0.04 IU/ML — SIGNIFICANT CHANGE UP
MAGNESIUM SERPL-MCNC: 1.9 MG/DL — SIGNIFICANT CHANGE UP (ref 1.8–2.4)
MCHC RBC-ENTMCNC: 27.7 PG — SIGNIFICANT CHANGE UP (ref 27–31)
MCHC RBC-ENTMCNC: 32.6 G/DL — SIGNIFICANT CHANGE UP (ref 32–37)
MCV RBC AUTO: 85 FL — SIGNIFICANT CHANGE UP (ref 80–94)
MITOGEN IGNF BCKGRD COR BLD-ACNC: 0.13 IU/ML — SIGNIFICANT CHANGE UP
NIGHT BLUE STAIN TISS: SIGNIFICANT CHANGE UP
NRBC # BLD: 0 /100 WBCS — SIGNIFICANT CHANGE UP (ref 0–0)
PLATELET # BLD AUTO: 184 K/UL — SIGNIFICANT CHANGE UP (ref 130–400)
POTASSIUM SERPL-MCNC: 4.3 MMOL/L — SIGNIFICANT CHANGE UP (ref 3.5–5)
POTASSIUM SERPL-SCNC: 4.3 MMOL/L — SIGNIFICANT CHANGE UP (ref 3.5–5)
PROT FLD-MCNC: 3.6 G/DL — SIGNIFICANT CHANGE UP
PROT SERPL-MCNC: 6.6 G/DL — SIGNIFICANT CHANGE UP (ref 6–8)
RBC # BLD: 4.12 M/UL — LOW (ref 4.7–6.1)
RBC # FLD: 15.9 % — HIGH (ref 11.5–14.5)
SODIUM SERPL-SCNC: 127 MMOL/L — LOW (ref 135–146)
SPECIMEN SOURCE: SIGNIFICANT CHANGE UP
SPECIMEN SOURCE: SIGNIFICANT CHANGE UP
WBC # BLD: 10.26 K/UL — SIGNIFICANT CHANGE UP (ref 4.8–10.8)
WBC # FLD AUTO: 10.26 K/UL — SIGNIFICANT CHANGE UP (ref 4.8–10.8)

## 2018-06-08 RX ORDER — LACTULOSE 10 G/15ML
200 SOLUTION ORAL
Qty: 0 | Refills: 0 | Status: DISCONTINUED | OUTPATIENT
Start: 2018-06-08 | End: 2018-06-10

## 2018-06-08 RX ORDER — LACTULOSE 10 G/15ML
20 SOLUTION ORAL EVERY 4 HOURS
Qty: 0 | Refills: 0 | Status: DISCONTINUED | OUTPATIENT
Start: 2018-06-08 | End: 2018-06-09

## 2018-06-08 RX ORDER — RALTEGRAVIR 400 MG/1
400 TABLET, FILM COATED ORAL EVERY 12 HOURS
Qty: 0 | Refills: 0 | Status: DISCONTINUED | OUTPATIENT
Start: 2018-06-08 | End: 2018-06-13

## 2018-06-08 RX ORDER — DARUNAVIR 75 MG/1
600 TABLET, FILM COATED ORAL EVERY 12 HOURS
Qty: 0 | Refills: 0 | Status: DISCONTINUED | OUTPATIENT
Start: 2018-06-08 | End: 2018-06-13

## 2018-06-08 RX ORDER — RITONAVIR 100 MG/1
100 TABLET, FILM COATED ORAL EVERY 12 HOURS
Qty: 0 | Refills: 0 | Status: DISCONTINUED | OUTPATIENT
Start: 2018-06-08 | End: 2018-06-13

## 2018-06-08 RX ORDER — I.V. FAT EMULSION 20 G/100ML
1.4 EMULSION INTRAVENOUS
Qty: 99.4 | Refills: 0 | Status: DISCONTINUED | OUTPATIENT
Start: 2018-06-09 | End: 2018-06-09

## 2018-06-08 RX ORDER — ELECTROLYTE SOLUTION,INJ
1 VIAL (ML) INTRAVENOUS
Qty: 0 | Refills: 0 | Status: DISCONTINUED | OUTPATIENT
Start: 2018-06-09 | End: 2018-06-09

## 2018-06-08 RX ORDER — ETRAVIRINE 200 MG/1
200 TABLET ORAL EVERY 12 HOURS
Qty: 0 | Refills: 0 | Status: DISCONTINUED | OUTPATIENT
Start: 2018-06-08 | End: 2018-06-13

## 2018-06-08 RX ADMIN — LACTULOSE 20 GRAM(S): 10 SOLUTION ORAL at 23:04

## 2018-06-08 RX ADMIN — Medication 1 MILLIGRAM(S): at 10:33

## 2018-06-08 RX ADMIN — LACTULOSE 20 GRAM(S): 10 SOLUTION ORAL at 08:31

## 2018-06-08 RX ADMIN — LACTULOSE 20 GRAM(S): 10 SOLUTION ORAL at 05:57

## 2018-06-08 RX ADMIN — Medication 1 MILLIGRAM(S): at 11:27

## 2018-06-08 RX ADMIN — LACTULOSE 20 GRAM(S): 10 SOLUTION ORAL at 10:32

## 2018-06-08 RX ADMIN — Medication 1 MILLIGRAM(S): at 17:32

## 2018-06-08 RX ADMIN — Medication 100 MILLIGRAM(S): at 11:28

## 2018-06-08 RX ADMIN — MORPHINE SULFATE 2 MILLIGRAM(S): 50 CAPSULE, EXTENDED RELEASE ORAL at 09:43

## 2018-06-08 RX ADMIN — Medication 10 MILLIGRAM(S): at 17:24

## 2018-06-08 RX ADMIN — METHADONE HYDROCHLORIDE 110 MILLIGRAM(S): 40 TABLET ORAL at 11:26

## 2018-06-08 RX ADMIN — Medication 1 PATCH: at 07:21

## 2018-06-08 RX ADMIN — LACTULOSE 20 GRAM(S): 10 SOLUTION ORAL at 00:30

## 2018-06-08 RX ADMIN — Medication 1 TABLET(S): at 11:27

## 2018-06-08 RX ADMIN — SPIRONOLACTONE 100 MILLIGRAM(S): 25 TABLET, FILM COATED ORAL at 05:57

## 2018-06-08 RX ADMIN — Medication 1 MILLIGRAM(S): at 05:57

## 2018-06-08 RX ADMIN — Medication 1 MILLIGRAM(S): at 21:43

## 2018-06-08 RX ADMIN — LACTULOSE 20 GRAM(S): 10 SOLUTION ORAL at 03:54

## 2018-06-08 RX ADMIN — Medication 10 MILLIGRAM(S): at 05:58

## 2018-06-08 RX ADMIN — Medication 1 MILLIGRAM(S): at 01:54

## 2018-06-08 RX ADMIN — LACTULOSE 20 GRAM(S): 10 SOLUTION ORAL at 17:25

## 2018-06-08 RX ADMIN — Medication 400 MILLIGRAM(S): at 23:24

## 2018-06-08 RX ADMIN — Medication 1 PATCH: at 11:26

## 2018-06-08 RX ADMIN — MORPHINE SULFATE 2 MILLIGRAM(S): 50 CAPSULE, EXTENDED RELEASE ORAL at 20:30

## 2018-06-08 RX ADMIN — LACTULOSE 20 GRAM(S): 10 SOLUTION ORAL at 01:54

## 2018-06-08 RX ADMIN — LACTULOSE 20 GRAM(S): 10 SOLUTION ORAL at 14:41

## 2018-06-08 RX ADMIN — MORPHINE SULFATE 2 MILLIGRAM(S): 50 CAPSULE, EXTENDED RELEASE ORAL at 20:00

## 2018-06-08 RX ADMIN — Medication 40 MILLIGRAM(S): at 05:56

## 2018-06-08 RX ADMIN — TRAMADOL HYDROCHLORIDE 50 MILLIGRAM(S): 50 TABLET ORAL at 23:04

## 2018-06-08 RX ADMIN — Medication 1 MILLIGRAM(S): at 14:41

## 2018-06-08 RX ADMIN — CEFTRIAXONE 100 GRAM(S): 500 INJECTION, POWDER, FOR SOLUTION INTRAMUSCULAR; INTRAVENOUS at 11:26

## 2018-06-08 RX ADMIN — ENOXAPARIN SODIUM 30 MILLIGRAM(S): 100 INJECTION SUBCUTANEOUS at 11:25

## 2018-06-08 NOTE — PROGRESS NOTE ADULT - ASSESSMENT
Patient is a 48 YO male with pmh of HIV, liver cirrhosis/carcinoma, hep c infection s/p tx, alcohol abuse, heroin abuse here for therapeutic paracentesis and was found to be hyponatremic    ·	Hyponatremia multifactorial - intravascular volume depletion, liver cirrhosis, low solute intake  ·	- low Urine sodium 20, high Urine Osm >400 c/w prerenal state  ·	encourage po solute intake, free water restriction 1 L daily  ·	Na improving slowly  ·	-s/p paracenthesis twice, SBP on ABx Rocephine    ·	Decompensated Liver cirrhosis/carcinoma most likely secondary to hep c infection and alcohol abuse   ·	hem/onc f/u  ·	GI f/u appreciated  ·	HIV on HAART therapy

## 2018-06-08 NOTE — CONSULT NOTE ADULT - ASSESSMENT
IMP:  - HIV on HAART  - hep C  - HCC with peritoneal mets  - decompensated cirrhosis  - active EtOH and drug use  - hepatic encephalopathy  - poor po intake likely over many months, but worsened now. Hope is that encephalopathy will improve with current treatments, and if so, pt can be encouraged to eat adequately.    SUGGEST:  - would advise placement of small bore NG feeding tube, but due to probably varices, this won't be done at this time.  - if pt at some point is stable and GI does EGD, would strongly recommend placement of small bore (preferably) naso-duodenal tube for supplementation and meds.  - check baseline triglyceride level  - will treat 5-7 days of PPN while pt receiving lactulose, etc  - prognosis is dismal

## 2018-06-08 NOTE — PROGRESS NOTE ADULT - SUBJECTIVE AND OBJECTIVE BOX
FRANCO JACINTO  47y, Male      OVERNIGHT EVENTS:    Lethargic. Wife at bedside. does try to respond     VITALS:  T(F): 98.1, Max: 100.3 (06-07-18 @ 20:51)  HR: 108  BP: 114/78  RR: 16Vital Signs Last 24 Hrs  T(C): 36.7 (08 Jun 2018 06:30), Max: 37.9 (07 Jun 2018 20:51)  T(F): 98.1 (08 Jun 2018 06:30), Max: 100.3 (07 Jun 2018 20:51)  HR: 108 (08 Jun 2018 06:30) (104 - 111)  BP: 114/78 (08 Jun 2018 06:30) (97/62 - 114/78)  BP(mean): --  RR: 16 (08 Jun 2018 06:30) (12 - 20)  SpO2: --    TESTS & MEASUREMENTS:                        11.4   10.26 )-----------( 184      ( 08 Jun 2018 07:38 )             35.0     06-08    127<L>  |  87<L>  |  11  ----------------------------<  110<H>  4.3   |  26  |  0.6<L>    Ca    8.5      08 Jun 2018 07:38  Mg     1.9     06-08    TPro  6.6  /  Alb  2.6<L>  /  TBili  1.3<H>  /  DBili  0.9<H>  /  AST  765<H>  /  ALT  279<H>  /  AlkPhos  265<H>  06-08    LIVER FUNCTIONS - ( 08 Jun 2018 07:38 )  Alb: 2.6 g/dL / Pro: 6.6 g/dL / ALK PHOS: 265 U/L / ALT: 279 U/L / AST: 765 U/L / GGT: x             Culture - Body Fluid with Gram Stain (collected 06-07-18 @ 08:01)  Source: .Body Fluid Abdominal Fluid  Gram Stain (06-08-18 @ 02:41):    polymorphonuclear leukocytes seen per low power field    No organisms seen per oil power field    by cytocentrifuge            RADIOLOGY & ADDITIONAL TESTS:    ANTIBIOTICS:  cefTRIAXone   IVPB 2 Gram(s) IV Intermittent every 24 hours  darunavir 600 milliGRAM(s) Oral two times a day  etravirine 200 milliGRAM(s) Oral two times a day after meals  raltegravir Tablet 400 milliGRAM(s) Oral two times a day  rifaximin 550 milliGRAM(s) Oral two times a day  ritonavir Tablet 100 milliGRAM(s) Oral two times a day

## 2018-06-08 NOTE — CONSULT NOTE ADULT - ASSESSMENT
48 yo M admitted for decompensated liver disease/cirrhosis (2/2 to treated Hep C in 2015, with SVR as per wife) by criteria of Hepatic encephalopathy, SBP, HCC with peritoneal mets? SP Right hepatectomy at [place], active ETOH use at risk for withdrawal, resultant hyponatremia. MELD=20    1)Decompensated cirrhosis  2)Hep C (treated: reportedly maintains SVR)  3)Hepatic encephalopathy  4)HCC with suspected mets  5)ETOH excessive use  6)Risk for withdrawal  7)SBP  8)?esophageal varices     Rec:  1)lactulose to achieve 4-6 BMs/day then anthony to maintain 2 BMs per day  2)Rifaximin  3)Daily CMP, INR (Avoid HRS)  4)Onc to comment on possibility for sorafenib  5)CIWA and ativan protocol then avoid benzos  6)Rocephin x5-7 days  7)High protein diet  8)May consider initiation of lasix/aldactone (start at 20/50 QD) ONCE hyponatremia and SBP treated.  9)Poor prognosis family is inquiring about home hospice but if clinically improves then would need screening EGD 48 yo M admitted for decompensated liver disease/cirrhosis (2/2 to treated Hep C in 2015, with SVR as per wife), Hepatic encephalopathy, SBP, HCC with peritoneal mets? SP Right hepatectomy at [place], active ETOH use at risk for withdrawal, resultant hyponatremia. MELD=20    1)Decompensated cirrhosis  2)Hep C (treated: reportedly maintains SVR)  3)Hepatic encephalopathy  4)HCC with suspected peritoneal mets  5)ETOH excessive use  6)Risk for withdrawal  7)SBP  8)?esophageal varices     Rec:  1)lactulose to achieve 4-6 BMs/day then taper up to maintain 2 BMs per day  2)Rifaximin  3)Daily CMP, INR (Avoid HRS)  4)Onc to comment on possibility for sorafenib  5)CIWA and ativan protocol then avoid benzos  6)Rocephin x5-7 days  7)High protein diet  8)May consider initiation of lasix/aldactone (start at 20/50 QD) ONCE hyponatremia and SBP treated.  9)Poor prognosis family is inquiring about home hospice but if clinically improves then would need screening EGD (out patient)

## 2018-06-08 NOTE — CONSULT NOTE ADULT - SUBJECTIVE AND OBJECTIVE BOX
48 yo M w/ h/o HIV on HAART therapy, liver cirrhosis/liver carcinoma, Hepatitis C infection s/p treatment, alcohol abuse, withdrawal seizures in past, h/o heroin abuse-on methadone program x 2 weeks (but reportedly + recent heroine use and active EtOH ingestion). Pt sent in Dr Reilly from his office for therapeutic paracentesis.  pt has been having progressive abdominal distension with abdominal pain for about a months, and for the past few days he has been having difficulty ambulating and SOB on exertion. He also c/o nausea/vomiting, on/off fever, abdominal pain since being diagnosed with cancer but worse for last month. In ED, 3 L of ascitic fluid was removed and pt was supposed to be d/luis from ED but due to low sodium on BMP, pt is being admitted.   Family is concerned that pt isn't eating because of worsened encephalopathy.    Past Medical History:  EtOH dependence    Hepatitis-C  s/p treatment  HIV (human immunodeficiency virus infection)  on HAART  Nicotine dependence    PPD+ (purified protein derivative positive).  decompensated cirrhosis, probable esophageal varices  hepatocellular ca with peritoneal mets  H/O partial resection of liver     EXAM:  lethargic, verbal and appropriate when wakened  markedly cachectic with severe loss of both LBM and fat mass (more of the former) throughout  abd distended, not soft, +tender everywhere, +c/o back pain, s/p paracentesis while here x 2 without much improvement  iv in right hand  T(F): 97 (08 Jun 2018 13:05), Max: 100.3 (07 Jun 2018 20:51)  HR: 99 (08 Jun 2018 13:05) (99 - 111)  BP: 110/76 (08 Jun 2018 13:05) (97/62 - 114/78)  RR: 24 (08 Jun 2018 13:05) (12 - 24)  Height (cm): 172.72 (06 Jun 2018 04:44)  Weight (kg): 71 (06 Jun 2018 04:44)           ------------ invalid r/t BMI or other assessments due to tumor mass and ascites    MEDICATIONS  (STANDING):  cefTRIAXone   IVPB 2 Gram(s) IV Intermittent every 24 hours  darunavir 600 milliGRAM(s) Oral two times a day  dronabinol 10 milliGRAM(s) Oral two times a day  enoxaparin Injectable 30 milliGRAM(s) SubCutaneous daily  etravirine 200 milliGRAM(s) Oral two times a day after meals  folic acid 1 milliGRAM(s) Oral daily  furosemide    Tablet 40 milliGRAM(s) Oral daily  lactulose Retention Enema 200 Gram(s) Rectal two times a day  lactulose Syrup 20 Gram(s) Oral every 4 hours  LORazepam   Injectable 1 milliGRAM(s) IV Push every 4 hours  LORazepam   Injectable   IV Push   methadone    Tablet 110 milliGRAM(s) Oral daily  multivitamin/minerals 1 Tablet(s) Oral daily  nicotine - 21 mG/24Hr(s) Patch 1 patch Transdermal daily  raltegravir Tablet 400 milliGRAM(s) Oral two times a day  rifaximin 550 milliGRAM(s) Oral two times a day  ritonavir Tablet 100 milliGRAM(s) Oral two times a day  spironolactone 100 milliGRAM(s) Oral daily  thiamine 100 milliGRAM(s) Oral daily                        11.4   10.26 )-----------( 184      ( 08 Jun 2018 07:38 )             35.0   06-08    127<L>  |  87<L>  |  11  ----------------------------<  110<H>  4.3   |  26  |  0.6<L>    Ca    8.5      08 Jun 2018 07:38  Mg     1.9     06-08    TPro  6.6  /  Alb  2.6<L>  /  TBili  1.3<H>  /  DBili  0.9<H>  /  AST  765<H>  /  ALT  279<H>  /  AlkPhos  265<H>  06-08

## 2018-06-08 NOTE — PROGRESS NOTE ADULT - SUBJECTIVE AND OBJECTIVE BOX
SUBJECTIVE:    Patient is a 47y old Male who presents with a chief complaint of hyponatremia (06 Jun 2018 03:34)    Currently admitted to medicine with the primary diagnosis of Hyponatremia     Today is hospital day 2d. This morning he is resting comfortably in bed and reports no new issues or overnight events.     PAST MEDICAL & SURGICAL HISTORY  PPD+ (purified protein derivative positive)  Hepatitis-C: s/p treatment  Nicotine dependence  HIV (human immunodeficiency virus infection): on HAART  EtOH dependence  H/O resection of liver: partial    SOCIAL HISTORY:  Negative for smoking/alcohol/drug use.     ALLERGIES:  No Known Allergies    MEDICATIONS:  STANDING MEDICATIONS  cefTRIAXone   IVPB 2 Gram(s) IV Intermittent every 24 hours  darunavir 600 milliGRAM(s) Oral two times a day  dronabinol 10 milliGRAM(s) Oral two times a day  enoxaparin Injectable 30 milliGRAM(s) SubCutaneous daily  etravirine 200 milliGRAM(s) Oral two times a day after meals  folic acid 1 milliGRAM(s) Oral daily  furosemide    Tablet 40 milliGRAM(s) Oral daily  lactulose Retention Enema 200 Gram(s) Rectal two times a day  lactulose Syrup 20 Gram(s) Oral every 2 hours  LORazepam   Injectable 1 milliGRAM(s) IV Push every 4 hours  LORazepam   Injectable   IV Push   methadone    Tablet 110 milliGRAM(s) Oral daily  multivitamin/minerals 1 Tablet(s) Oral daily  nicotine - 21 mG/24Hr(s) Patch 1 patch Transdermal daily  raltegravir Tablet 400 milliGRAM(s) Oral two times a day  rifaximin 550 milliGRAM(s) Oral two times a day  ritonavir Tablet 100 milliGRAM(s) Oral two times a day  spironolactone 100 milliGRAM(s) Oral daily  thiamine 100 milliGRAM(s) Oral daily    PRN MEDICATIONS  ibuprofen  Tablet 400 milliGRAM(s) Oral three times a day PRN  morphine  - Injectable 2 milliGRAM(s) IV Push every 6 hours PRN  traMADol 50 milliGRAM(s) Oral two times a day PRN    VITALS:   T(F): 98.1  HR: 108  BP: 114/78  RR: 16  SpO2: --    LABS:                        11.4   10.26 )-----------( 184      ( 08 Jun 2018 07:38 )             35.0     06-08    127<L>  |  87<L>  |  11  ----------------------------<  110<H>  4.3   |  26  |  0.6<L>    Ca    8.5      08 Jun 2018 07:38  Mg     1.9     06-08    TPro  6.6  /  Alb  2.6<L>  /  TBili  1.3<H>  /  DBili  0.9<H>  /  AST  765<H>  /  ALT  279<H>  /  AlkPhos  265<H>  06-08        Culture - Body Fluid with Gram Stain (collected 07 Jun 2018 08:01)  Source: .Body Fluid Abdominal Fluid  Gram Stain (08 Jun 2018 02:41):    polymorphonuclear leukocytes seen per low power field    No organisms seen per oil power field    by cytocentrifuge    PHYSICAL EXAM:  GEN: No acute distress  LUNGS: Clear to auscultation bilaterally   HEART: S1/S2 present. RRR.   ABD: Soft, non-tender, + distended. Bowel sounds present  EXT: NC/NC/NE/2+PP/RUTH  NEURO: AAOX2, lethargic but easily arousable

## 2018-06-08 NOTE — CONSULT NOTE ADULT - ASSESSMENT
1. Metastatic Hepatocellular carcinoma with ascites, peritoneal nodules admitted initially for therapuetic paracentesis. Hospital course complicated by fevers, lethargy seconadry to SBP. History is also complicated with HIV, alcohol  abuse.       #Metastatic hepatocellular carcinoma.   - Given his multiple comorbidities, he may not tolerate systemic chemotherapy ( TKI ).   -He is encephalopathic now, once the SBP is treated and AMS is improved, will discuss regarding whether to pursue systemic treatments  or not     # Spontaneous bacterial peritonitis  - f/u ascitic fluid studies including cx and path  - s ON rocephin      #AMS, 2/2 hepatic encephalopathy vs polypharmacy (on ativan ciwa protocol and methadone)  - increased lactulose dose, titrate to 2 soft BMs per day  - f/u ammonia  - Agree with  rifaximin    #Chronic moderate hyponatremia likely 2/2 cirrhosis vs HIV related SIADH  - fluid restriction to less than 1 L per day  - c/w lasix and aldactone     #HIV, last CD4 count 152 from 05/18 and HIV RNA undetectable  - c/w HAART  therapy    #Alcohol abuse  - serial CIWA scores, monitor electrolytes, VS  - folate, multivitamin, thiamine  - ativan protocol 1. Metastatic Hepatocellular carcinoma with ascites, peritoneal nodules admitted initially for therapuetic paracentesis. Hospital course complicated by fevers, lethargy seconadry to SBP. History is also complicated with HIV, alcohol  abuse.       #Metastatic hepatocellular carcinoma.   - Given his multiple comorbidities, he may not tolerate systemic chemotherapy.   -He is encephalopathic now, once the SBP is treated and AMS is improved, will discuss regarding whether to pursue systemic treatments  or not     # Spontaneous bacterial peritonitis  - f/u ascitic fluid studies including cx and path  - s ON rocephin      #AMS, 2/2 hepatic encephalopathy vs polypharmacy (on ativan ciwa protocol and methadone)  - increased lactulose dose, titrate to 2 soft BMs per day  - f/u ammonia  - Agree with  rifaximin    #Chronic moderate hyponatremia likely 2/2 cirrhosis vs HIV related SIADH  - fluid restriction to less than 1 L per day  - c/w lasix and aldactone     #HIV, last CD4 count 152 from 05/18 and HIV RNA undetectable  - c/w HAART  therapy 1. Metastatic Hepatocellular carcinoma with ascites, peritoneal nodules admitted initially for therapuetic paracentesis. Hospital course complicated by fevers, lethargy seconadry to SBP. History is also complicated with HIV, alcohol  abuse.       #Metastatic hepatocellular carcinoma. s/p paracentesis  2X  , total more than 5 liters removed . abdomen less distended. .   -He is encephalopathic now, once the SBP is treated and AMS is improved, will discuss regarding whether to pursue systemic treatments  or not     # Spontaneous bacterial peritonitis  - f/u ascitic fluid studies including cx and path  - s ON rocephin      #AMS, 2/2 hepatic encephalopathy vs polypharmacy (on ativan ciwa protocol and methadone)  - increased lactulose dose, titrate to 2 soft BMs per day  - f/u ammonia  - Agree with  rifaximin    #Chronic moderate hyponatremia likely 2/2 cirrhosis vs HIV related SIADH  - fluid restriction to less than 1 L per day  - c/w lasix and aldactone     #HIV, last CD4 count 152 from 05/18 and HIV RNA undetectable  - c/w HAART  therapy

## 2018-06-08 NOTE — CONSULT NOTE ADULT - SUBJECTIVE AND OBJECTIVE BOX
Gastroenterology Consultation    47y year old  Male with :  Patient is a 47y old  Male who presents with a chief complaint of hyponatremia (06 Jun 2018 03:34)    HPI:  46 yo M w/ h/o HIV on HAART therapy, liver cirrhosis/liver carcinoma, Hepatitis C infection s/p treatment, alcohol abuse, withdrawal seizures in past, h/o heroin abuse-on methadone program since 2 weeks now  sent in Dr Reilly from his office for therapeutic paracentesis.    Pt went to see Dr Reilly for his liver cancer. He has been having progressive abdominal distention for about 1 month. It is associated with abdominal pain, nausea/vomiting, sometimes fever/chills, b/l LE edema. He has been having difficulty walking around due to the belly distension so Dr Reilly thought the paracentesis would help him and sent him here. The abdominal pain is manly in upper abdomen, severe per pt, per wife it chronic but getting worse. He is going to see pain management on Friday. He has been having nausea/vomiting daily for past few months, yesterday 4 times, non-bloody. He is also having fever/chills on and off for about a month per wife. Wife also mentioned him being a little confused during day. On my assessment, pt is AAO*3.      In ED, 3 L of ascitic fluid was removed and pt was supposed to be d/luis from ED but due to low sodium on BMP, pt is being admitted.     pt denies any dizziness, numbness. (06 Jun 2018 03:34)      Previous colonoscopy(ies): None  Previous EGD(s): None on file reports EGD in the past ?varices? but denies variceal bleeding      Review of Systems: noncontributory other than listed in Admission H & P  Social Hx: IVDA, ETOH excessive use (>6 beers/day) last drink on tuesday    PAST MEDICAL & SURGICAL HISTORY:  PPD+ (purified protein derivative positive)  Hepatitis-C: s/p treatment  Nicotine dependence  HIV (human immunodeficiency virus infection): on HAART  EtOH dependence  H/O resection of liver: partial      Intelence 200 mg oral tablet: 1 tab(s) orally 2 times a day  Isentress 400 mg oral tablet: 1 tab(s) orally 2 times a day  lactulose:   Norvir 100 mg oral capsule: 1 cap(s) orally 2 times a day  Prezista 600 mg oral tablet: 1 tab(s) orally 2 times a day      Allergies: No Known Allergies    Medications:   cefTRIAXone   IVPB 2 Gram(s) IV Intermittent every 24 hours  darunavir 600 milliGRAM(s) Oral two times a day  dronabinol 10 milliGRAM(s) Oral two times a day  enoxaparin Injectable 30 milliGRAM(s) SubCutaneous daily  etravirine 200 milliGRAM(s) Oral two times a day after meals  folic acid 1 milliGRAM(s) Oral daily  furosemide    Tablet 40 milliGRAM(s) Oral daily  ibuprofen  Tablet 400 milliGRAM(s) Oral three times a day PRN  lactulose Retention Enema 200 Gram(s) Rectal two times a day  lactulose Syrup 20 Gram(s) Oral every 2 hours  LORazepam   Injectable 1 milliGRAM(s) IV Push every 4 hours  LORazepam   Injectable   IV Push   methadone    Tablet 110 milliGRAM(s) Oral daily  morphine  - Injectable 2 milliGRAM(s) IV Push every 6 hours PRN  multivitamin/minerals 1 Tablet(s) Oral daily  nicotine - 21 mG/24Hr(s) Patch 1 patch Transdermal daily  raltegravir Tablet 400 milliGRAM(s) Oral two times a day  rifaximin 550 milliGRAM(s) Oral two times a day  ritonavir Tablet 100 milliGRAM(s) Oral two times a day  spironolactone 100 milliGRAM(s) Oral daily  thiamine 100 milliGRAM(s) Oral daily  traMADol 50 milliGRAM(s) Oral two times a day PRN        Physical Examination:  T(C): 36.1 (06-08-18 @ 13:05), Max: 37.9 (06-07-18 @ 20:51)  HR: 99 (06-08-18 @ 13:05) (99 - 111)  BP: 110/76 (06-08-18 @ 13:05) (97/62 - 114/78)  RR: 24 (06-08-18 @ 13:05) (12 - 24)  SpO2: --    GA: NAD  HEENT: PERRLA  Heart: Regular  Lungs: CTA  Abdomen: Distended  (+) asterixis      Data:                        11.4   10.26 )-----------( 184      ( 08 Jun 2018 07:38 )             35.0     MCV 85.0 (06-08-18)    RDW 15.9 (06-08-18)    11.4  06-08-18 @ 07:38  12.1  06-07-18 @ 08:48  11.4  06-06-18 @ 11:55  11.8  06-05-18 @ 20:43        06-08    127<L>  |  87<L>  |  11  ----------------------------<  110<H>  4.3   |  26  |  0.6<L>    Ca    8.5      08 Jun 2018 07:38  Mg     1.9     06-08    TPro  6.6  /  Alb  2.6<L>  /  TBili  1.3<H>  /  DBili  0.9<H>  /  AST  765<H>  /  ALT  279<H>  /  AlkPhos  265<H>  06-08    Liver panel trend:  TBili 1.3   /      /      /   AlkP 265   /   Tptn 6.6   /   Alb 2.6    /   DBili 0.9      06-08  TBili 1.3   /      /      /   AlkP 262   /   Tptn 7.2   /   Alb 2.8    /   DBili --      06-07  TBili 1.4   /      /      /   AlkP 247   /   Tptn 6.5   /   Alb 2.6    /   DBili --      06-06  TBili 1.3   /      /      /   AlkP 279   /   Tptn 7.6   /   Alb 2.8    /   DBili 0.4      06-05      INR: 1.36: NO ANTICOAGULANT,NORMAL            0.65 -  1.30  ORAL ANTICOAGULANT,STD DOSE        2.00 - 3.00  MECHANICAL HEART VALVES            2.50 - 3.50  NOTE: INR RESULTS ARE INTENDED FOR USE ONLY TO  MONITOR  ORAL ANTICOAGULANT THERAPY IN STABILIZED  PATIENTS. ratio (06.05.18 @ 20:43)      Culture - Body Fluid with Gram Stain (collected 07 Jun 2018 08:01)  Source: .Body Fluid Abdominal Fluid  Gram Stain (08 Jun 2018 02:41):    polymorphonuclear leukocytes seen per low power field    No organisms seen per oil power field    by cytocentrifuge    < from: CT Abdomen and Pelvis No Cont (06.07.18 @ 06:26) >    HEPATOBILIARY: New diffuse heterogeneity ofthe left hepatic lobe,   limited evaluation without intravenous contrast, however suspicious for   infiltrative neoplasm.  Post partial right hepatic lobe resection, with   resultant leftward shift of the remaining liver.     SPLEEN: Unremarkable.    PANCREAS: Unremarkable.    ADRENAL GLANDS: Unremarkable.    KIDNEYS: Unremarkable.    ABDOMINOPELVIC NODES: Multiple subcentimeter and mildly enlarged   retroperitoneal lymph nodes, measuring up to 2.2 x 1.7 cm within the left   para-aortic region (series 3, image 57).    PELVIC ORGANS: Unremarkable..    PERITONEUM/MESENTERY/BOWEL: Moderate volume abdominopelvic ascites and   innumerable subcentimeter peritoneal soft tissue nodules, suspicious for   peritoneal metastases.  Air and stool filled colon, with maximal diameter   of 6 cm in the transverse colon. No evidence of bowel obstruction. No   free intraperitoneal air.    < end of copied text >

## 2018-06-08 NOTE — CONSULT NOTE ADULT - SUBJECTIVE AND OBJECTIVE BOX
HPI:    46 yo M w/ h/o HIV on HAART therapy, liver cirrhosis/liver carcinoma, Hepatitis C infection s/p treatment, alcohol abuse, withdrawal seizures in past, h/o heroin abuse-on methadone program since    2 weeks now  sent to ER  for therapeutic paracentesis.    Patient was treated for Hepatitis C last year. In November 2017, he was noted to have Liver lesion on routine sonogram, was evaluated by [facility] and Resection was performed. In February 2018, on a surveillance sonogram, was found to have multiple liver and diaphragmatic nodules.   CT chest showed multiple Lung nodules.   He was deemed not a candidate for surgery or chemotherapy    In ED, 3 L of ascitic fluid was removed and pt was supposed to be d/luis from ED but due to low sodium on BMP he was admitted.   However Post paracentesis, He was found to be more lethargic, spiking fevers concerning for SBP.         PAST MEDICAL & SURGICAL HISTORY:  PPD+ (purified protein derivative positive)  Hepatitis-C: s/p treatment  Nicotine dependence  HIV (human immunodeficiency virus infection): on HAART  EtOH dependence  H/O resection of liver: partial        FAMILY HISTORY:  No pertinent family history in first degree relatives      Social History:    Allergies    No Known Allergies    Intolerances        MEDICATIONS  (STANDING):  cefTRIAXone   IVPB 2 Gram(s) IV Intermittent every 24 hours  darunavir 600 milliGRAM(s) Oral two times a day  dronabinol 10 milliGRAM(s) Oral two times a day  enoxaparin Injectable 30 milliGRAM(s) SubCutaneous daily  etravirine 200 milliGRAM(s) Oral two times a day after meals  folic acid 1 milliGRAM(s) Oral daily  furosemide    Tablet 40 milliGRAM(s) Oral daily  lactulose Retention Enema 200 Gram(s) Rectal two times a day  lactulose Syrup 20 Gram(s) Oral every 2 hours  LORazepam   Injectable 1 milliGRAM(s) IV Push every 4 hours  LORazepam   Injectable   IV Push   methadone    Tablet 110 milliGRAM(s) Oral daily  multivitamin/minerals 1 Tablet(s) Oral daily  nicotine - 21 mG/24Hr(s) Patch 1 patch Transdermal daily  raltegravir Tablet 400 milliGRAM(s) Oral two times a day  rifaximin 550 milliGRAM(s) Oral two times a day  ritonavir Tablet 100 milliGRAM(s) Oral two times a day  spironolactone 100 milliGRAM(s) Oral daily  thiamine 100 milliGRAM(s) Oral daily    MEDICATIONS  (PRN):  ibuprofen  Tablet 400 milliGRAM(s) Oral three times a day PRN Temp > 100.4  morphine  - Injectable 2 milliGRAM(s) IV Push every 6 hours PRN Severe Pain (7 - 10)  traMADol 50 milliGRAM(s) Oral two times a day PRN Severe Pain (7 - 10)      Vital Signs Last 24 Hrs  T(C): 36.7 (08 Jun 2018 06:30), Max: 37.9 (07 Jun 2018 20:51)  T(F): 98.1 (08 Jun 2018 06:30), Max: 100.3 (07 Jun 2018 20:51)  HR: 108 (08 Jun 2018 06:30) (104 - 111)  BP: 114/78 (08 Jun 2018 06:30) (97/62 - 114/78)  BP(mean): --  RR: 16 (08 Jun 2018 06:30) (12 - 20)  SpO2: --    ROS:  Negative except for:    PHYSICAL EXAM  General: adult in NAD  HEENT: clear oropharynx, anicteric sclera, pink conjunctiva  Neck: supple  CV: normal S1/S2 with no murmur rubs or gallops  Lungs: positive air movement b/l ant lungs,clear to auscultation, no wheezes, no rales  Abdomen: soft non-tender non-distended, no hepatosplenomegaly  Ext: no clubbing cyanosis or edema  Skin: no rashes and no petechiae  Neuro: alert and oriented X 4, no focal deficits      LABS:                          11.4   10.26 )-----------( 184      ( 08 Jun 2018 07:38 )             35.0         Mean Cell Volume : 85.0 fL  Mean Cell Hemoglobin : 27.7 pg  Mean Cell Hemoglobin Concentration : 32.6 g/dL  Auto Neutrophil # : x  Auto Lymphocyte # : x  Auto Monocyte # : x  Auto Eosinophil # : x  Auto Basophil # : x  Auto Neutrophil % : x  Auto Lymphocyte % : x  Auto Monocyte % : x  Auto Eosinophil % : x  Auto Basophil % : x      Serial CBC's  06-08 @ 07:38  Hct-35.0 / Hgb-11.4 / Plat-184 / RBC-4.12 / WBC-10.26  Serial CBC's  06-07 @ 08:48  Hct-37.8 / Hgb-12.1 / Plat-169 / RBC-4.42 / WBC-11.32  Serial CBC's  06-06 @ 11:55  Hct-34.9 / Hgb-11.4 / Plat-153 / RBC-4.13 / WBC-8.05  Serial CBC's  06-05 @ 20:43  Hct-36.0 / Hgb-11.8 / Plat-200 / RBC-4.27 / WBC-10.68      06-08    127<L>  |  87<L>  |  11  ----------------------------<  110<H>  4.3   |  26  |  0.6<L>    Ca    8.5      08 Jun 2018 07:38  Phos  2.9     06-06  Mg     1.9     06-08    TPro  6.6  /  Alb  2.6<L>  /  TBili  1.3<H>  /  DBili  0.9<H>  /  AST  765<H>  /  ALT  279<H>  /  AlkPhos  265<H>  06-08      LIVER FUNCTIONS - ( 08 Jun 2018 07:38 )  Alb: 2.6 g/dL / Pro: 6.6 g/dL / ALK PHOS: 265 U/L / ALT: 279 U/L / AST: 765 U/L / GGT: x           Cell Count, Body Fluid (06.07.18 @ 11:27)    Monocyte/Macrophage Count - Body Fluid: many %    Fluid Segmented Granulocytes: 75% %    Fluid Type: Other    Body Fluid Appearance: Cloudy    BF Lymphocytes: 25%    Other Body Cells: plasma cells %    Tube Type: Lavender    Color - Body Fluid: Orange    Total Nucleated Cell Count, Body Fluid: 1314 /uL    Total RBC Count: 7000 /uL          RADIOLOGY & ADDITIONAL STUDIES:      < from: CT Abdomen and Pelvis No Cont (06.07.18 @ 06:26) >    IMPRESSION:        1. Since December 29, 2017, new diffuse heterogeneity of the left hepatic   lobe, suspicious for infiltrative hepatic tumor.    2. Moderate volume abdominopelvic ascites and innumerable subcentimeter   peritoneal soft tissue nodules, suspicious for peritoneal metastases.    3. Multiple subcentimeter and mildly enlarged retroperitoneal lymph   nodes, measuring up to 2.2 x 1.7 cm within the left para-aortic region.    4. Small left pleural effusion and small bibasilar airspace opacities,   possibly reflecting atelectasis or pneumonia in the appropriate clinical   setting; follow-up to resolution is suggested.    < end of copied text > HPI:    46 yo M w/ h/o HIV on HAART therapy, liver cirrhosis/liver carcinoma, Hepatitis C infection s/p treatment, alcohol abuse, withdrawal seizures in past, h/o heroin abuse-on methadone program since    2 weeks now  sent to ER  for therapeutic paracentesis.    Patient was treated for Hepatitis C last year. In November 2017, he was noted to have Liver lesion on routine sonogram, was evaluated by [facility] and Resection was performed. In February 2018, on a surveillance sonogram, was found to have multiple liver and diaphragmatic nodules.   CT chest showed multiple Lung nodules.   He was deemed not a candidate for surgery or chemotherapy    In ED, 3 L of ascitic fluid was removed and pt was supposed to be d/luis from ED but due to low sodium on BMP he was admitted.   However Post paracentesis, He was found to be more lethargic, spiking fevers concerning for SBP.         PAST MEDICAL & SURGICAL HISTORY:  PPD+ (purified protein derivative positive)  Hepatitis-C: s/p treatment  Nicotine dependence  HIV (human immunodeficiency virus infection): on HAART  EtOH dependence  H/O resection of liver: partial        FAMILY HISTORY:  No pertinent family history in first degree relatives      Social History:    Allergies    No Known Allergies    Intolerances        MEDICATIONS  (STANDING):  cefTRIAXone   IVPB 2 Gram(s) IV Intermittent every 24 hours  darunavir 600 milliGRAM(s) Oral two times a day  dronabinol 10 milliGRAM(s) Oral two times a day  enoxaparin Injectable 30 milliGRAM(s) SubCutaneous daily  etravirine 200 milliGRAM(s) Oral two times a day after meals  folic acid 1 milliGRAM(s) Oral daily  furosemide    Tablet 40 milliGRAM(s) Oral daily  lactulose Retention Enema 200 Gram(s) Rectal two times a day  lactulose Syrup 20 Gram(s) Oral every 2 hours  LORazepam   Injectable 1 milliGRAM(s) IV Push every 4 hours  LORazepam   Injectable   IV Push   methadone    Tablet 110 milliGRAM(s) Oral daily  multivitamin/minerals 1 Tablet(s) Oral daily  nicotine - 21 mG/24Hr(s) Patch 1 patch Transdermal daily  raltegravir Tablet 400 milliGRAM(s) Oral two times a day  rifaximin 550 milliGRAM(s) Oral two times a day  ritonavir Tablet 100 milliGRAM(s) Oral two times a day  spironolactone 100 milliGRAM(s) Oral daily  thiamine 100 milliGRAM(s) Oral daily    MEDICATIONS  (PRN):  ibuprofen  Tablet 400 milliGRAM(s) Oral three times a day PRN Temp > 100.4  morphine  - Injectable 2 milliGRAM(s) IV Push every 6 hours PRN Severe Pain (7 - 10)  traMADol 50 milliGRAM(s) Oral two times a day PRN Severe Pain (7 - 10)      Vital Signs Last 24 Hrs  T(C): 36.7 (08 Jun 2018 06:30), Max: 37.9 (07 Jun 2018 20:51)  T(F): 98.1 (08 Jun 2018 06:30), Max: 100.3 (07 Jun 2018 20:51)  HR: 108 (08 Jun 2018 06:30) (104 - 111)  BP: 114/78 (08 Jun 2018 06:30) (97/62 - 114/78)  BP(mean): --  RR: 16 (08 Jun 2018 06:30) (12 - 20)  SpO2: --    ROS:  Negative except for: see HPI      PHYSICAL EXAM:  GEN: No acute distress  LUNGS: Decreased Breath sounds at bases  HEART: S1/S2 present. RRR.   ABD: firm and distended. no ttp.  Bowel sounds present  NEURO: Lethargic     LABS:                          11.4   10.26 )-----------( 184      ( 08 Jun 2018 07:38 )             35.0         Mean Cell Volume : 85.0 fL  Mean Cell Hemoglobin : 27.7 pg  Mean Cell Hemoglobin Concentration : 32.6 g/dL  Auto Neutrophil # : x  Auto Lymphocyte # : x  Auto Monocyte # : x  Auto Eosinophil # : x  Auto Basophil # : x  Auto Neutrophil % : x  Auto Lymphocyte % : x  Auto Monocyte % : x  Auto Eosinophil % : x  Auto Basophil % : x      Serial CBC's  06-08 @ 07:38  Hct-35.0 / Hgb-11.4 / Plat-184 / RBC-4.12 / WBC-10.26  Serial CBC's  06-07 @ 08:48  Hct-37.8 / Hgb-12.1 / Plat-169 / RBC-4.42 / WBC-11.32  Serial CBC's  06-06 @ 11:55  Hct-34.9 / Hgb-11.4 / Plat-153 / RBC-4.13 / WBC-8.05  Serial CBC's  06-05 @ 20:43  Hct-36.0 / Hgb-11.8 / Plat-200 / RBC-4.27 / WBC-10.68      06-08    127<L>  |  87<L>  |  11  ----------------------------<  110<H>  4.3   |  26  |  0.6<L>    Ca    8.5      08 Jun 2018 07:38  Phos  2.9     06-06  Mg     1.9     06-08    TPro  6.6  /  Alb  2.6<L>  /  TBili  1.3<H>  /  DBili  0.9<H>  /  AST  765<H>  /  ALT  279<H>  /  AlkPhos  265<H>  06-08      LIVER FUNCTIONS - ( 08 Jun 2018 07:38 )  Alb: 2.6 g/dL / Pro: 6.6 g/dL / ALK PHOS: 265 U/L / ALT: 279 U/L / AST: 765 U/L / GGT: x           Cell Count, Body Fluid (06.07.18 @ 11:27)    Monocyte/Macrophage Count - Body Fluid: many %    Fluid Segmented Granulocytes: 75% %    Fluid Type: Other    Body Fluid Appearance: Cloudy    BF Lymphocytes: 25%    Other Body Cells: plasma cells %    Tube Type: Lavender    Color - Body Fluid: Orange    Total Nucleated Cell Count, Body Fluid: 1314 /uL    Total RBC Count: 7000 /uL          RADIOLOGY & ADDITIONAL STUDIES:      < from: CT Abdomen and Pelvis No Cont (06.07.18 @ 06:26) >    IMPRESSION:        1. Since December 29, 2017, new diffuse heterogeneity of the left hepatic   lobe, suspicious for infiltrative hepatic tumor.    2. Moderate volume abdominopelvic ascites and innumerable subcentimeter   peritoneal soft tissue nodules, suspicious for peritoneal metastases.    3. Multiple subcentimeter and mildly enlarged retroperitoneal lymph   nodes, measuring up to 2.2 x 1.7 cm within the left para-aortic region.    4. Small left pleural effusion and small bibasilar airspace opacities,   possibly reflecting atelectasis or pneumonia in the appropriate clinical   setting; follow-up to resolution is suggested.    < end of copied text > HPI:    48 yo M w/ h/o HIV on HAART therapy, liver cirrhosis/liver carcinoma, Hepatitis C infection s/p treatment, alcohol abuse, withdrawal seizures in past, h/o heroin abuse-on methadone program since    2 weeks now  sent to ER  for therapeutic paracentesis.    Patient was treated for Hepatitis C last year. In November 2017, he was noted to have Liver lesion on routine sonogram, was evaluated by [facility] and Resection was performed. In February 2018, on a surveillance sonogram, was found to have multiple liver and diaphragmatic nodules.   CT chest showed multiple Lung nodules.   He was deemed not a candidate for surgery or chemotherapy    In ED, 3 L of ascitic fluid was removed and pt was supposed to be d/luis from ED but due to low sodium on BMP he was admitted.   However Post paracentesis, He was found to be more lethargic, spiking fevers secondary to SBP. On lactulose and Iv rocephin and mental status improving.        PAST MEDICAL & SURGICAL HISTORY:  PPD+ (purified protein derivative positive)  Hepatitis-C: s/p treatment  Nicotine dependence  HIV (human immunodeficiency virus infection): on HAART  EtOH dependence  H/O resection of liver: partial        FAMILY HISTORY:  No pertinent family history in first degree relatives      Social History: Drug abuse, alcohol abuse    Allergies    No Known Allergies    Intolerances        MEDICATIONS  (STANDING):  cefTRIAXone   IVPB 2 Gram(s) IV Intermittent every 24 hours  darunavir 600 milliGRAM(s) Oral two times a day  dronabinol 10 milliGRAM(s) Oral two times a day  enoxaparin Injectable 30 milliGRAM(s) SubCutaneous daily  etravirine 200 milliGRAM(s) Oral two times a day after meals  folic acid 1 milliGRAM(s) Oral daily  furosemide    Tablet 40 milliGRAM(s) Oral daily  lactulose Retention Enema 200 Gram(s) Rectal two times a day  lactulose Syrup 20 Gram(s) Oral every 2 hours  LORazepam   Injectable 1 milliGRAM(s) IV Push every 4 hours  LORazepam   Injectable   IV Push   methadone    Tablet 110 milliGRAM(s) Oral daily  multivitamin/minerals 1 Tablet(s) Oral daily  nicotine - 21 mG/24Hr(s) Patch 1 patch Transdermal daily  raltegravir Tablet 400 milliGRAM(s) Oral two times a day  rifaximin 550 milliGRAM(s) Oral two times a day  ritonavir Tablet 100 milliGRAM(s) Oral two times a day  spironolactone 100 milliGRAM(s) Oral daily  thiamine 100 milliGRAM(s) Oral daily    MEDICATIONS  (PRN):  ibuprofen  Tablet 400 milliGRAM(s) Oral three times a day PRN Temp > 100.4  morphine  - Injectable 2 milliGRAM(s) IV Push every 6 hours PRN Severe Pain (7 - 10)  traMADol 50 milliGRAM(s) Oral two times a day PRN Severe Pain (7 - 10)      Vital Signs Last 24 Hrs  T(C): 36.7 (08 Jun 2018 06:30), Max: 37.9 (07 Jun 2018 20:51)  T(F): 98.1 (08 Jun 2018 06:30), Max: 100.3 (07 Jun 2018 20:51)  HR: 108 (08 Jun 2018 06:30) (104 - 111)  BP: 114/78 (08 Jun 2018 06:30) (97/62 - 114/78)  BP(mean): --  RR: 16 (08 Jun 2018 06:30) (12 - 20)  SpO2: --    ROS:  Negative except for: see HPI      PHYSICAL EXAM:  GEN: No acute distress  LUNGS: Decreased Breath sounds at bases  HEART: S1/S2 present. RRR.   ABD: firm and distended. no ttp.  Bowel sounds present  NEURO: AA0 x 2.     LABS:                          11.4   10.26 )-----------( 184      ( 08 Jun 2018 07:38 )             35.0         Mean Cell Volume : 85.0 fL  Mean Cell Hemoglobin : 27.7 pg  Mean Cell Hemoglobin Concentration : 32.6 g/dL  Auto Neutrophil # : x  Auto Lymphocyte # : x  Auto Monocyte # : x  Auto Eosinophil # : x  Auto Basophil # : x  Auto Neutrophil % : x  Auto Lymphocyte % : x  Auto Monocyte % : x  Auto Eosinophil % : x  Auto Basophil % : x      Serial CBC's  06-08 @ 07:38  Hct-35.0 / Hgb-11.4 / Plat-184 / RBC-4.12 / WBC-10.26  Serial CBC's  06-07 @ 08:48  Hct-37.8 / Hgb-12.1 / Plat-169 / RBC-4.42 / WBC-11.32  Serial CBC's  06-06 @ 11:55  Hct-34.9 / Hgb-11.4 / Plat-153 / RBC-4.13 / WBC-8.05  Serial CBC's  06-05 @ 20:43  Hct-36.0 / Hgb-11.8 / Plat-200 / RBC-4.27 / WBC-10.68      06-08    127<L>  |  87<L>  |  11  ----------------------------<  110<H>  4.3   |  26  |  0.6<L>    Ca    8.5      08 Jun 2018 07:38  Phos  2.9     06-06  Mg     1.9     06-08    TPro  6.6  /  Alb  2.6<L>  /  TBili  1.3<H>  /  DBili  0.9<H>  /  AST  765<H>  /  ALT  279<H>  /  AlkPhos  265<H>  06-08      LIVER FUNCTIONS - ( 08 Jun 2018 07:38 )  Alb: 2.6 g/dL / Pro: 6.6 g/dL / ALK PHOS: 265 U/L / ALT: 279 U/L / AST: 765 U/L / GGT: x           Cell Count, Body Fluid (06.07.18 @ 11:27)    Monocyte/Macrophage Count - Body Fluid: many %    Fluid Segmented Granulocytes: 75% %    Fluid Type: Other    Body Fluid Appearance: Cloudy    BF Lymphocytes: 25%    Other Body Cells: plasma cells %    Tube Type: Lavender    Color - Body Fluid: Orange    Total Nucleated Cell Count, Body Fluid: 1314 /uL    Total RBC Count: 7000 /uL          RADIOLOGY & ADDITIONAL STUDIES:      < from: CT Abdomen and Pelvis No Cont (06.07.18 @ 06:26) >    IMPRESSION:        1. Since December 29, 2017, new diffuse heterogeneity of the left hepatic   lobe, suspicious for infiltrative hepatic tumor.    2. Moderate volume abdominopelvic ascites and innumerable subcentimeter   peritoneal soft tissue nodules, suspicious for peritoneal metastases.    3. Multiple subcentimeter and mildly enlarged retroperitoneal lymph   nodes, measuring up to 2.2 x 1.7 cm within the left para-aortic region.    4. Small left pleural effusion and small bibasilar airspace opacities,   possibly reflecting atelectasis or pneumonia in the appropriate clinical   setting; follow-up to resolution is suggested.    < end of copied text >

## 2018-06-08 NOTE — PROGRESS NOTE ADULT - SUBJECTIVE AND OBJECTIVE BOX
Nephrology progress note    Patient is seen and examined, events over the last 24 h noted .  Feeling a little better. Eating a little more.    Allergies:  No Known Allergies    Hospital Medications:   MEDICATIONS  (STANDING):  cefTRIAXone   IVPB 2 Gram(s) IV Intermittent every 24 hours  darunavir 600 milliGRAM(s) Oral two times a day  dronabinol 10 milliGRAM(s) Oral two times a day  enoxaparin Injectable 30 milliGRAM(s) SubCutaneous daily  etravirine 200 milliGRAM(s) Oral two times a day after meals  folic acid 1 milliGRAM(s) Oral daily  furosemide    Tablet 40 milliGRAM(s) Oral daily  lactulose Retention Enema 200 Gram(s) Rectal two times a day  lactulose Syrup 20 Gram(s) Oral every 4 hours  LORazepam   Injectable 1 milliGRAM(s) IV Push every 4 hours  LORazepam   Injectable   IV Push   methadone    Tablet 110 milliGRAM(s) Oral daily  multivitamin/minerals 1 Tablet(s) Oral daily  nicotine - 21 mG/24Hr(s) Patch 1 patch Transdermal daily  raltegravir Tablet 400 milliGRAM(s) Oral two times a day  rifaximin 550 milliGRAM(s) Oral two times a day  ritonavir Tablet 100 milliGRAM(s) Oral two times a day  spironolactone 100 milliGRAM(s) Oral daily  thiamine 100 milliGRAM(s) Oral daily        VITALS:  T(F): 97 (06-08-18 @ 13:05), Max: 100.3 (06-07-18 @ 20:51)  HR: 99 (06-08-18 @ 13:05)  BP: 110/76 (06-08-18 @ 13:05)  RR: 24 (06-08-18 @ 13:05)  SpO2: --  Wt(kg): --    06-06 @ 07:01  -  06-07 @ 07:00  --------------------------------------------------------  IN: 50 mL / OUT: 1 mL / NET: 49 mL          PHYSICAL EXAM:  Constitutional: NAD  HEENT: anicteric sclera, oropharynx clear, MMM  Neck: No JVD  Respiratory: CTAB, no wheezes, rales or rhonchi  Cardiovascular: S1, S2, RRR  Gastrointestinal: BS+, soft, distended abdomen, ascites  Extremities: No cyanosis or clubbing. No peripheral edema  Neurological: A/O x 3  : No CVA tenderness. No rivera.   Skin: No rashes  Vascular Access:    LABS:  06-08    127<L>  |  87<L>  |  11  ----------------------------<  110<H>  4.3   |  26  |  0.6<L>    SODIUM TREND:  Sodium 127 [06-08 @ 07:38]  Sodium 125 [06-07 @ 08:48]  Sodium 124 [06-06 @ 21:47]  Sodium 125 [06-06 @ 11:55]  Sodium 123 [06-05 @ 20:43]    Ca    8.5      08 Jun 2018 07:38  Mg     1.9     06-08    TPro  6.6  /  Alb  2.6<L>  /  TBili  1.3<H>  /  DBili  0.9<H>  /  AST  765<H>  /  ALT  279<H>  /  AlkPhos  265<H>  06-08                          11.4   10.26 )-----------( 184      ( 08 Jun 2018 07:38 )             35.0       Urine Studies:    Osmolality, Random Urine: 433 mos/kg (06-07 @ 01:59)  Sodium, Random Urine: 20.0 mmoL/L (06-07 @ 01:58)  Creatinine, Random Urine: 151 mg/dL (06-07 @ 01:58)  Chloride, Random Urine: 23 (06-07 @ 01:58)    RADIOLOGY & ADDITIONAL STUDIES:

## 2018-06-08 NOTE — PROGRESS NOTE ADULT - ASSESSMENT
46 yo M w/ h/o HIV on HAART therapy, liver cirrhosis/liver carcinoma, Hepatitis C infection s/p treatment, alcohol abuse, withdrawal seizures in past, h/o heroin abuse on methadone program sent in Dr Reilly from his office for therapeutic paracentesis. pt has been having progressive abdominal distension with abdominal pain for about a month. For the past few days he has been having difficulty ambulating and SOB on exertion so Dr Reilly sent him in for therapeutic para. In ED, 3 L of ascitic fluid was removed and pt was supposed to be d/luis from ED but due to low sodium on BMP, he was admitted to medicine. His problems are being managed as follows --     #SBP (PMN count > 900 in ascitic fluid) in setting of liver CA, hep C/cirrhosis  - s/p therapeutic paracentesis in ED, 3 L was removed, no tests were sent ; and therapeutic/diagnostic paracentesis w/ IR 6/7   - f/u ascitic fluid cx and path  - c/w rocephin  - f/u bcx  - ID following appreciate recs  - GI c/s , f/u recs    #AMS, 2/2 hepatic encephalopathy vs polypharmacy (on ativan ciwa protocol and methadone)  - c/w lactulose, pt having adequate BMs this am  - c/w rifaximin    #Chronic moderate hyponatremia likely 2/2 cirrhosis vs HIV related SIADH, improving  - fluid restriction to less than 1 L per day  - nephrology c/s appreciate recs  - c/w lasix and aldactone     #HIV, last CD4 count 152 from 05/18 and HIV RNA undetectable  - c/w HAART therapy    #Metastatic hepatocellular ca s/p L lobe resection  - pt is being considered for therapy by Dr Reilly, per heme/onc fellow likely not a candidate for therapy   - OP f/u with hem/onc  - pain management c/s pending     #Alcohol abuse  - serial CIWA scores, monitor electrolytes, VS  - folate, multivitamin, thiamine  - ativan protocol    #h/o heroin abuse on methadone   - c/w methadone 110 mg, dose verified with [facility]    #DVT ppx: lovenox    #Diet: regular diet    #Dispo: pending improvement of MS, resolution of SBP; from home 46 yo M w/ h/o HIV on HAART therapy, liver cirrhosis/liver carcinoma, Hepatitis C infection s/p treatment, alcohol abuse, withdrawal seizures in past, h/o heroin abuse on methadone program sent in Dr Reilly from his office for therapeutic paracentesis. pt has been having progressive abdominal distension with abdominal pain for about a month. For the past few days he has been having difficulty ambulating and SOB on exertion so Dr Reilly sent him in for therapeutic para. In ED, 3 L of ascitic fluid was removed and pt was supposed to be d/luis from ED but due to low sodium on BMP, he was admitted to medicine. His problems are being managed as follows --     #SBP (PMN count > 900 in ascitic fluid) in setting of liver CA, hep C/cirrhosis  - s/p therapeutic paracentesis in ED, 3 L was removed, no tests were sent ; and therapeutic/diagnostic paracentesis w/ IR 6/7   - f/u ascitic fluid cx and path  - c/w rocephin  - f/u bcx  - ID following appreciate recs  - GI c/s , f/u recs    #AMS, 2/2 hepatic encephalopathy vs polypharmacy (on ativan ciwa protocol and methadone)  - c/w lactulose, pt having adequate BMs this am  - c/w rifaximin    #Chronic moderate hyponatremia likely 2/2 cirrhosis vs HIV related SIADH, improving  - fluid restriction to less than 1 L per day  - nephrology c/s appreciate recs  - c/w lasix and aldactone     #HIV, last CD4 count 152 from 05/18 and HIV RNA undetectable  - c/w HAART therapy    #Metastatic hepatocellular ca s/p L lobe resection  - pt is being considered for therapy by Dr Reilly, per heme/onc fellow likely not a candidate for therapy   - OP f/u with hem/onc  - pain management c/s pending     #Alcohol abuse  - serial CIWA scores, monitor electrolytes, VS  - folate, multivitamin, thiamine  - ativan protocol    #h/o heroin abuse on methadone   - c/w methadone 110 mg, dose verified with [facility]    #DVT ppx: lovenox    #Diet: regular diet for now , not taking in much PO ; pending nutrition c/s Dr. Hinojosa , f/u recs - pt poor candidate for NGT as has varices on CT abdomen    #Dispo: pending improvement of MS, resolution of SBP; from home

## 2018-06-08 NOTE — PROGRESS NOTE ADULT - ASSESSMENT
#Abdominal pain, fever, ascites - r/o SBP   - s/p paracentesis in ED,3 L was removed, no tests were sent  - IR c/s for diagnostic para  - f/u ascitic fluid studies including cx and path  - started rocephin  - f/u bcx  - ID following appreciate recs    #AMS, 2/2 hepatic encephalopathy vs polypharmacy (on ativan ciwa protocol and methadone)  - increased lactulose dose, titrate to 2 soft BMs per day  - f/u ammonia  - started rifaximin    #Chronic moderate hyponatremia likely 2/2 cirrhosis vs HIV related SIADH  - fluid restriction to less than 1 L per day  - nephrology c/s appreciate recs  - c/w lasix and aldactone     #HIV, last CD4 count 152 from 05/18 and HIV RNA undetectable  - c/w HAART  therapy    Notes from Dr Joseph   #Metastatic hepatocellular ca s/p L lobe resection  - F/U with Dr Reilly  - doubt treatment in pts current clinically condition an option    #Alcohol abuse  - serial CIWA scores, monitor electrolytes, VS  - folate, multivitamin, thiamine  - ativan protocol.  # SBP.  -rocephin 2 gm q24h    #h/o heroin abuse on methadone   - c/w methadone 110 mg, dose    #DVT ppx: lovenox    #Diet: Needs to have nutrition.   Cannot place an NG tube as high probability that pt has esophageal varices.  F/U with Dr Hi

## 2018-06-09 LAB
ALBUMIN SERPL ELPH-MCNC: 2.5 G/DL — LOW (ref 3.5–5.2)
ALP SERPL-CCNC: 262 U/L — HIGH (ref 30–115)
ALT FLD-CCNC: 270 U/L — HIGH (ref 0–41)
ANION GAP SERPL CALC-SCNC: 16 MMOL/L — HIGH (ref 7–14)
AST SERPL-CCNC: 605 U/L — HIGH (ref 0–41)
BILIRUB DIRECT SERPL-MCNC: 0.9 MG/DL — HIGH (ref 0–0.2)
BILIRUB INDIRECT FLD-MCNC: 0.4 MG/DL — SIGNIFICANT CHANGE UP (ref 0.2–1.2)
BILIRUB SERPL-MCNC: 1.3 MG/DL — HIGH (ref 0.2–1.2)
BUN SERPL-MCNC: 12 MG/DL — SIGNIFICANT CHANGE UP (ref 10–20)
CALCIUM SERPL-MCNC: 9 MG/DL — SIGNIFICANT CHANGE UP (ref 8.5–10.1)
CHLORIDE SERPL-SCNC: 84 MMOL/L — LOW (ref 98–110)
CO2 SERPL-SCNC: 25 MMOL/L — SIGNIFICANT CHANGE UP (ref 17–32)
CREAT SERPL-MCNC: 0.5 MG/DL — LOW (ref 0.7–1.5)
GLUCOSE SERPL-MCNC: 113 MG/DL — HIGH (ref 70–99)
HCT VFR BLD CALC: 36.5 % — LOW (ref 42–52)
HGB BLD-MCNC: 11.8 G/DL — LOW (ref 14–18)
MAGNESIUM SERPL-MCNC: 1.8 MG/DL — SIGNIFICANT CHANGE UP (ref 1.8–2.4)
MCHC RBC-ENTMCNC: 27.5 PG — SIGNIFICANT CHANGE UP (ref 27–31)
MCHC RBC-ENTMCNC: 32.3 G/DL — SIGNIFICANT CHANGE UP (ref 32–37)
MCV RBC AUTO: 85.1 FL — SIGNIFICANT CHANGE UP (ref 80–94)
NRBC # BLD: 0 /100 WBCS — SIGNIFICANT CHANGE UP (ref 0–0)
PLATELET # BLD AUTO: 209 K/UL — SIGNIFICANT CHANGE UP (ref 130–400)
POTASSIUM SERPL-MCNC: 4.2 MMOL/L — SIGNIFICANT CHANGE UP (ref 3.5–5)
POTASSIUM SERPL-SCNC: 4.2 MMOL/L — SIGNIFICANT CHANGE UP (ref 3.5–5)
PROT SERPL-MCNC: 6.8 G/DL — SIGNIFICANT CHANGE UP (ref 6–8)
RBC # BLD: 4.29 M/UL — LOW (ref 4.7–6.1)
RBC # FLD: 16 % — HIGH (ref 11.5–14.5)
SODIUM SERPL-SCNC: 125 MMOL/L — LOW (ref 135–146)
TRIGL SERPL-MCNC: 66 MG/DL — SIGNIFICANT CHANGE UP (ref 10–149)
WBC # BLD: 11.5 K/UL — HIGH (ref 4.8–10.8)
WBC # FLD AUTO: 11.5 K/UL — HIGH (ref 4.8–10.8)

## 2018-06-09 RX ADMIN — RALTEGRAVIR 400 MILLIGRAM(S): 400 TABLET, FILM COATED ORAL at 17:51

## 2018-06-09 RX ADMIN — ENOXAPARIN SODIUM 30 MILLIGRAM(S): 100 INJECTION SUBCUTANEOUS at 12:34

## 2018-06-09 RX ADMIN — MORPHINE SULFATE 2 MILLIGRAM(S): 50 CAPSULE, EXTENDED RELEASE ORAL at 12:12

## 2018-06-09 RX ADMIN — DARUNAVIR 600 MILLIGRAM(S): 75 TABLET, FILM COATED ORAL at 17:50

## 2018-06-09 RX ADMIN — MORPHINE SULFATE 2 MILLIGRAM(S): 50 CAPSULE, EXTENDED RELEASE ORAL at 22:45

## 2018-06-09 RX ADMIN — Medication 400 MILLIGRAM(S): at 12:11

## 2018-06-09 RX ADMIN — Medication 1 PATCH: at 12:35

## 2018-06-09 RX ADMIN — Medication 400 MILLIGRAM(S): at 09:44

## 2018-06-09 RX ADMIN — LACTULOSE 20 GRAM(S): 10 SOLUTION ORAL at 02:04

## 2018-06-09 RX ADMIN — Medication 1 EACH: at 20:05

## 2018-06-09 RX ADMIN — Medication 1 PATCH: at 12:34

## 2018-06-09 RX ADMIN — CEFTRIAXONE 100 GRAM(S): 500 INJECTION, POWDER, FOR SOLUTION INTRAMUSCULAR; INTRAVENOUS at 12:34

## 2018-06-09 RX ADMIN — Medication 0.5 MILLIGRAM(S): at 06:24

## 2018-06-09 RX ADMIN — METHADONE HYDROCHLORIDE 110 MILLIGRAM(S): 40 TABLET ORAL at 12:15

## 2018-06-09 RX ADMIN — Medication 400 MILLIGRAM(S): at 00:30

## 2018-06-09 RX ADMIN — Medication 0.5 MILLIGRAM(S): at 21:21

## 2018-06-09 RX ADMIN — MORPHINE SULFATE 2 MILLIGRAM(S): 50 CAPSULE, EXTENDED RELEASE ORAL at 02:04

## 2018-06-09 RX ADMIN — Medication 0.5 MILLIGRAM(S): at 02:04

## 2018-06-09 RX ADMIN — SPIRONOLACTONE 100 MILLIGRAM(S): 25 TABLET, FILM COATED ORAL at 06:24

## 2018-06-09 RX ADMIN — MORPHINE SULFATE 2 MILLIGRAM(S): 50 CAPSULE, EXTENDED RELEASE ORAL at 02:30

## 2018-06-09 RX ADMIN — Medication 10 MILLIGRAM(S): at 17:20

## 2018-06-09 RX ADMIN — RITONAVIR 100 MILLIGRAM(S): 100 TABLET, FILM COATED ORAL at 17:50

## 2018-06-09 RX ADMIN — Medication 0.5 MILLIGRAM(S): at 17:47

## 2018-06-09 RX ADMIN — Medication 0.5 MILLIGRAM(S): at 13:56

## 2018-06-09 RX ADMIN — Medication 40 MILLIGRAM(S): at 06:23

## 2018-06-09 RX ADMIN — TRAMADOL HYDROCHLORIDE 50 MILLIGRAM(S): 50 TABLET ORAL at 01:52

## 2018-06-09 RX ADMIN — ETRAVIRINE 200 MILLIGRAM(S): 200 TABLET ORAL at 17:50

## 2018-06-09 RX ADMIN — MORPHINE SULFATE 2 MILLIGRAM(S): 50 CAPSULE, EXTENDED RELEASE ORAL at 22:05

## 2018-06-09 RX ADMIN — MORPHINE SULFATE 2 MILLIGRAM(S): 50 CAPSULE, EXTENDED RELEASE ORAL at 09:45

## 2018-06-09 RX ADMIN — I.V. FAT EMULSION 31.06 GM/KG/DAY: 20 EMULSION INTRAVENOUS at 20:05

## 2018-06-09 RX ADMIN — Medication 0.5 MILLIGRAM(S): at 09:50

## 2018-06-09 RX ADMIN — Medication 10 MILLIGRAM(S): at 06:23

## 2018-06-09 NOTE — PROGRESS NOTE ADULT - ASSESSMENT
· Assessment		  Patient is a 46 YO male with pmh of HIV, liver cirrhosis/carcinoma, hep c infection s/p tx, alcohol abuse, heroin abuse here for therapeutic paracentesis and was found to be hyponatremic    ·	Hyponatremia multifactorial - intravascular volume depletion, liver cirrhosis, low solute intake  # sodium improving  #GI noted appreciated, started on lasix/aldactone  # patient remains with decrease po intake, seen by Dr Hi  #overall prognosis guarded

## 2018-06-09 NOTE — PROGRESS NOTE ADULT - ASSESSMENT
Pt with AIDS, cirrhosis, hyponatremia, metastatic hepatocellular ca s/p L lobe resection    Renal note appreciated    S/P paracentesis    On: cefTRIAXone  2 Gram(s) IV Intermittent every 24 hours  darunavir 600 mg Oral every 12 hours  etravirine 200 mg Oral every 12 hours  raltegravir 400 mg Oral every 12 hours  rifaximin 550 mg Oral two times a day for hepatic encephalopathy  ritonavir 100 mg Oral every 12 hours    PLAN  Continue current meds  Monitor Na  GI, renal & nutrition F/U

## 2018-06-09 NOTE — PROGRESS NOTE ADULT - SUBJECTIVE AND OBJECTIVE BOX
seen and examined  lethargic   no distress       Standing Inpatient Medications  cefTRIAXone   IVPB 2 Gram(s) IV Intermittent every 24 hours  darunavir 600 milliGRAM(s) Oral every 12 hours  dronabinol 10 milliGRAM(s) Oral two times a day  enoxaparin Injectable 30 milliGRAM(s) SubCutaneous daily  etravirine 200 milliGRAM(s) Oral every 12 hours  fat emulsion (Plant Based) 20% Infusion 1.4 Gm/kG/Day IV Continuous <Continuous>  folic acid 1 milliGRAM(s) Oral daily  furosemide    Tablet 40 milliGRAM(s) Oral daily  lactulose Retention Enema 200 Gram(s) Rectal two times a day  LORazepam   Injectable 0.5 milliGRAM(s) IV Push every 4 hours  LORazepam   Injectable   IV Push   methadone    Tablet 110 milliGRAM(s) Oral daily  multivitamin/minerals 1 Tablet(s) Oral daily  nicotine - 21 mG/24Hr(s) Patch 1 patch Transdermal daily  Parenteral Nutrition - Adult 1 Each TPN Continuous <Continuous>  raltegravir Tablet 400 milliGRAM(s) Oral every 12 hours  rifaximin 550 milliGRAM(s) Oral two times a day  ritonavir Tablet 100 milliGRAM(s) Oral every 12 hours  spironolactone 100 milliGRAM(s) Oral daily  thiamine 100 milliGRAM(s) Oral daily          VITALS/PHYSICAL EXAM  --------------------------------------------------------------------------------  T(C): 36.7 (06-09-18 @ 05:08), Max: 36.9 (06-08-18 @ 21:00)  HR: 104 (06-09-18 @ 05:08) (99 - 107)  BP: 129/84 (06-09-18 @ 05:08) (110/76 - 129/84)  RR: 18 (06-09-18 @ 05:08) (18 - 24)  SpO2: --  Wt(kg): --        Physical Exam:  	Gen: lethargic   	Abd: +distended  	LE: no edema      LABS/STUDIES  --------------------------------------------------------------------------------              11.4   10.26 >-----------<  184      [06-08-18 @ 07:38]              35.0     127  |  87  |  11  ----------------------------<  110      [06-08-18 @ 07:38]  4.3   |  26  |  0.6        Ca     8.5     [06-08-18 @ 07:38]      Mg     1.9     [06-08-18 @ 07:38]    TPro  6.6  /  Alb  2.6  /  TBili  1.3  /  DBili  0.9  /  AST  765  /  ALT  279  /  AlkPhos  265  [06-08-18 @ 07:38]          Creatinine Trend:  SCr 0.6 [06-08 @ 07:38]  SCr 0.6 [06-07 @ 08:48]  SCr 0.7 [06-06 @ 21:47]  SCr 0.5 [06-06 @ 11:55]  SCr 0.6 [06-05 @ 20:43]      Urine Creatinine 151      [06-07-18 @ 01:58]  Urine Sodium 20.0      [06-07-18 @ 01:58]  Urine Urea Nitrogen 725      [06-07-18 @ 01:59]  Urine Chloride 23      [06-07-18 @ 01:58]  Urine Osmolality 433      [06-07-18 @ 01:59]

## 2018-06-09 NOTE — PROGRESS NOTE ADULT - SUBJECTIVE AND OBJECTIVE BOX
infectious diseases progress note:  JACINTO GAMEZ is a 47yMale patient    HYPONATREMIA;ASCITES        ROS:  CONSTITUTIONAL:  Negative fever or chills, feels well, good appetite  EYES:  Negative  blurry vision or double vision  CARDIOVASCULAR:  Negative for chest pain or palpitations  RESPIRATORY:  Negative for cough, wheezing, or SOB   GASTROINTESTINAL:  Negative for nausea, vomiting, diarrhea, constipation, or abdominal pain  GENITOURINARY:  Negative frequency, urgency or dysuria  NEUROLOGIC:  No headache, confusion, dizziness, lightheadedness    Allergies    No Known Allergies    Intolerances        ANTIBIOTICS/RELEVANT:  antimicrobials  cefTRIAXone   IVPB 2 Gram(s) IV Intermittent every 24 hours  darunavir 600 milliGRAM(s) Oral every 12 hours  etravirine 200 milliGRAM(s) Oral every 12 hours  raltegravir Tablet 400 milliGRAM(s) Oral every 12 hours  rifaximin 550 milliGRAM(s) Oral two times a day  ritonavir Tablet 100 milliGRAM(s) Oral every 12 hours    immunologic:    OTHER:  dronabinol 10 milliGRAM(s) Oral two times a day  enoxaparin Injectable 30 milliGRAM(s) SubCutaneous daily  fat emulsion (Plant Based) 20% Infusion 1.4 Gm/kG/Day IV Continuous <Continuous>  folic acid 1 milliGRAM(s) Oral daily  furosemide    Tablet 40 milliGRAM(s) Oral daily  ibuprofen  Tablet 400 milliGRAM(s) Oral three times a day PRN  lactulose Retention Enema 200 Gram(s) Rectal two times a day  LORazepam   Injectable 0.5 milliGRAM(s) IV Push every 4 hours  LORazepam   Injectable   IV Push   methadone    Tablet 110 milliGRAM(s) Oral daily  morphine  - Injectable 2 milliGRAM(s) IV Push every 6 hours PRN  multivitamin/minerals 1 Tablet(s) Oral daily  nicotine - 21 mG/24Hr(s) Patch 1 patch Transdermal daily  Parenteral Nutrition - Adult 1 Each TPN Continuous <Continuous>  spironolactone 100 milliGRAM(s) Oral daily  thiamine 100 milliGRAM(s) Oral daily  traMADol 50 milliGRAM(s) Oral two times a day PRN      Objective:  T(F): 98 (06-09-18 @ 05:08), Max: 98.4 (06-08-18 @ 21:00)  HR: 104 (06-09-18 @ 05:08) (99 - 107)  BP: 129/84 (06-09-18 @ 05:08) (110/76 - 129/84)  RR: 18 (06-09-18 @ 05:08) (18 - 24)  SpO2: --    PHYSICAL EXAM  Constitutional:Well-developed, well nourished  Eyes:ALONSO, EOMI  Ear/Nose/Throat: no oral lesion, no sinus tenderness on percussion	  Neck:no JVD, no lymphadenopathy, supple  Respiratory: CTA clarissa  Cardiovascular: S1S2 RRR, no murmurs  Gastrointestinal:soft, (+) BS, no HSM  Extremities:no e/e/c    06-08    127<L>  |  87<L>  |  11  ----------------------------<  110<H>  4.3   |  26  |  0.6<L>    Mg     1.9     06-08    TPro  6.6  /  Alb  2.6<L>  /  TBili  1.3<H>  /  DBili  0.9<H>  /  AST  765<H>  /  ALT  279<H>  /  AlkPhos  265<H>  06-08                            11.4   10.26 )-----------( 184      ( 08 Jun 2018 07:38 )             35.0           Culture - Blood (collected 07 Jun 2018 18:54)  Source: .Blood None  Preliminary Report (09 Jun 2018 01:02):    No growth to date.    Culture - Acid Fast - Body Fluid w/Smear (collected 07 Jun 2018 08:01)  Source: .Body Fluid Peritoneal Fluid    Culture - Body Fluid with Gram Stain (collected 07 Jun 2018 08:01)  Source: .Body Fluid Abdominal Fluid  Gram Stain (08 Jun 2018 02:41):    polymorphonuclear leukocytes seen per low power field    No organisms seen per oil power field    by cytocentrifuge  Preliminary Report (08 Jun 2018 18:29):    No growth

## 2018-06-10 LAB
ALBUMIN SERPL ELPH-MCNC: 2.6 G/DL — LOW (ref 3.5–5.2)
ALP SERPL-CCNC: 282 U/L — HIGH (ref 30–115)
ALT FLD-CCNC: 238 U/L — HIGH (ref 0–41)
ANION GAP SERPL CALC-SCNC: 15 MMOL/L — HIGH (ref 7–14)
AST SERPL-CCNC: 458 U/L — HIGH (ref 0–41)
BILIRUB DIRECT SERPL-MCNC: 0.6 MG/DL — HIGH (ref 0–0.2)
BILIRUB INDIRECT FLD-MCNC: 0.3 MG/DL — SIGNIFICANT CHANGE UP (ref 0.2–1.2)
BILIRUB SERPL-MCNC: 0.9 MG/DL — SIGNIFICANT CHANGE UP (ref 0.2–1.2)
BUN SERPL-MCNC: 12 MG/DL — SIGNIFICANT CHANGE UP (ref 10–20)
CALCIUM SERPL-MCNC: 8.8 MG/DL — SIGNIFICANT CHANGE UP (ref 8.5–10.1)
CHLORIDE SERPL-SCNC: 82 MMOL/L — LOW (ref 98–110)
CO2 SERPL-SCNC: 26 MMOL/L — SIGNIFICANT CHANGE UP (ref 17–32)
CREAT SERPL-MCNC: 0.6 MG/DL — LOW (ref 0.7–1.5)
GLUCOSE SERPL-MCNC: 108 MG/DL — HIGH (ref 70–99)
HCT VFR BLD CALC: 37.9 % — LOW (ref 42–52)
HGB BLD-MCNC: 12.2 G/DL — LOW (ref 14–18)
MAGNESIUM SERPL-MCNC: 1.7 MG/DL — LOW (ref 1.8–2.4)
MCHC RBC-ENTMCNC: 27.7 PG — SIGNIFICANT CHANGE UP (ref 27–31)
MCHC RBC-ENTMCNC: 32.2 G/DL — SIGNIFICANT CHANGE UP (ref 32–37)
MCV RBC AUTO: 85.9 FL — SIGNIFICANT CHANGE UP (ref 80–94)
NRBC # BLD: 0 /100 WBCS — SIGNIFICANT CHANGE UP (ref 0–0)
PHOSPHATE SERPL-MCNC: 3.3 MG/DL — SIGNIFICANT CHANGE UP (ref 2.1–4.9)
PLATELET # BLD AUTO: 216 K/UL — SIGNIFICANT CHANGE UP (ref 130–400)
POTASSIUM SERPL-MCNC: 4.2 MMOL/L — SIGNIFICANT CHANGE UP (ref 3.5–5)
POTASSIUM SERPL-SCNC: 4.2 MMOL/L — SIGNIFICANT CHANGE UP (ref 3.5–5)
PROT SERPL-MCNC: 6.7 G/DL — SIGNIFICANT CHANGE UP (ref 6–8)
RBC # BLD: 4.41 M/UL — LOW (ref 4.7–6.1)
RBC # FLD: 15.9 % — HIGH (ref 11.5–14.5)
SODIUM SERPL-SCNC: 123 MMOL/L — LOW (ref 135–146)
WBC # BLD: 9.7 K/UL — SIGNIFICANT CHANGE UP (ref 4.8–10.8)
WBC # FLD AUTO: 9.7 K/UL — SIGNIFICANT CHANGE UP (ref 4.8–10.8)

## 2018-06-10 RX ORDER — MORPHINE SULFATE 50 MG/1
2 CAPSULE, EXTENDED RELEASE ORAL EVERY 4 HOURS
Qty: 0 | Refills: 0 | Status: DISCONTINUED | OUTPATIENT
Start: 2018-06-10 | End: 2018-06-11

## 2018-06-10 RX ORDER — I.V. FAT EMULSION 20 G/100ML
1.4 EMULSION INTRAVENOUS
Qty: 99.4 | Refills: 0 | Status: DISCONTINUED | OUTPATIENT
Start: 2018-06-10 | End: 2018-06-10

## 2018-06-10 RX ORDER — ELECTROLYTE SOLUTION,INJ
1 VIAL (ML) INTRAVENOUS
Qty: 0 | Refills: 0 | Status: DISCONTINUED | OUTPATIENT
Start: 2018-06-10 | End: 2018-06-10

## 2018-06-10 RX ORDER — LACTULOSE 10 G/15ML
20 SOLUTION ORAL
Qty: 0 | Refills: 0 | Status: DISCONTINUED | OUTPATIENT
Start: 2018-06-10 | End: 2018-06-11

## 2018-06-10 RX ADMIN — MORPHINE SULFATE 2 MILLIGRAM(S): 50 CAPSULE, EXTENDED RELEASE ORAL at 10:16

## 2018-06-10 RX ADMIN — Medication 40 MILLIGRAM(S): at 08:13

## 2018-06-10 RX ADMIN — METHADONE HYDROCHLORIDE 110 MILLIGRAM(S): 40 TABLET ORAL at 11:33

## 2018-06-10 RX ADMIN — Medication 2 MILLIGRAM(S): at 04:40

## 2018-06-10 RX ADMIN — Medication 10 MILLIGRAM(S): at 06:48

## 2018-06-10 RX ADMIN — Medication 0.5 MILLIGRAM(S): at 13:28

## 2018-06-10 RX ADMIN — Medication 1 PATCH: at 14:41

## 2018-06-10 RX ADMIN — Medication 0.5 MILLIGRAM(S): at 17:45

## 2018-06-10 RX ADMIN — MORPHINE SULFATE 2 MILLIGRAM(S): 50 CAPSULE, EXTENDED RELEASE ORAL at 16:45

## 2018-06-10 RX ADMIN — I.V. FAT EMULSION 31.06 GM/KG/DAY: 20 EMULSION INTRAVENOUS at 20:36

## 2018-06-10 RX ADMIN — Medication 1 PATCH: at 06:30

## 2018-06-10 RX ADMIN — MORPHINE SULFATE 2 MILLIGRAM(S): 50 CAPSULE, EXTENDED RELEASE ORAL at 04:14

## 2018-06-10 RX ADMIN — MORPHINE SULFATE 2 MILLIGRAM(S): 50 CAPSULE, EXTENDED RELEASE ORAL at 23:46

## 2018-06-10 RX ADMIN — LACTULOSE 20 GRAM(S): 10 SOLUTION ORAL at 17:38

## 2018-06-10 RX ADMIN — Medication 10 MILLIGRAM(S): at 17:37

## 2018-06-10 RX ADMIN — MORPHINE SULFATE 2 MILLIGRAM(S): 50 CAPSULE, EXTENDED RELEASE ORAL at 17:48

## 2018-06-10 RX ADMIN — SPIRONOLACTONE 100 MILLIGRAM(S): 25 TABLET, FILM COATED ORAL at 08:13

## 2018-06-10 RX ADMIN — Medication 1 EACH: at 20:36

## 2018-06-10 RX ADMIN — CEFTRIAXONE 100 GRAM(S): 500 INJECTION, POWDER, FOR SOLUTION INTRAMUSCULAR; INTRAVENOUS at 11:36

## 2018-06-10 RX ADMIN — MORPHINE SULFATE 2 MILLIGRAM(S): 50 CAPSULE, EXTENDED RELEASE ORAL at 12:47

## 2018-06-10 RX ADMIN — ENOXAPARIN SODIUM 30 MILLIGRAM(S): 100 INJECTION SUBCUTANEOUS at 11:35

## 2018-06-10 NOTE — PROVIDER CONTACT NOTE (OTHER) - SITUATION
as per CIWA protocol, pt was agitated, sweating, restless, pt was not due for ativan til 6pm today and no PRN order present. Dr. Ventura #[id number] notified.

## 2018-06-10 NOTE — PROGRESS NOTE ADULT - ASSESSMENT
· Assessment		  Patient is a 48 YO male with pmh of HIV, liver cirrhosis/carcinoma, hep c infection s/p tx, alcohol abuse, heroin abuse here for therapeutic paracentesis and was found to be hyponatremic    ·	Hyponatremia multifactorial - intravascular volume depletion, liver cirrhosis, low solute intake  # sodium worsening   #GI noted appreciated, started on lasix/aldactone   # patient remains with decrease po intake, seen by Dr Hi on TPN , adjust salt concentration in nutrition   # will need repeat paracentesis with albumin in AM   #overall prognosis guarded

## 2018-06-10 NOTE — PROGRESS NOTE ADULT - SUBJECTIVE AND OBJECTIVE BOX
infectious diseases progress note:  JACINTO GAMEZ is a 47yMale patient    HYPONATREMIA;ASCITES        ROS:  CONSTITUTIONAL:  Negative fever or chills, feels well, good appetite  EYES:  Negative  blurry vision or double vision  CARDIOVASCULAR:  Negative for chest pain or palpitations  RESPIRATORY:  Negative for cough, wheezing, or SOB   GASTROINTESTINAL:  Negative for nausea, vomiting, diarrhea, constipation, or abdominal pain  GENITOURINARY:  Negative frequency, urgency or dysuria  NEUROLOGIC:  No headache, confusion, dizziness, lightheadedness    Allergies    No Known Allergies    Intolerances        ANTIBIOTICS/RELEVANT:  antimicrobials  cefTRIAXone   IVPB 2 Gram(s) IV Intermittent every 24 hours  darunavir 600 milliGRAM(s) Oral every 12 hours  etravirine 200 milliGRAM(s) Oral every 12 hours  raltegravir Tablet 400 milliGRAM(s) Oral every 12 hours  rifaximin 550 milliGRAM(s) Oral two times a day  ritonavir Tablet 100 milliGRAM(s) Oral every 12 hours    immunologic:    OTHER:  dronabinol 10 milliGRAM(s) Oral two times a day  enoxaparin Injectable 30 milliGRAM(s) SubCutaneous daily  fat emulsion (Plant Based) 20% Infusion 1.4 Gm/kG/Day IV Continuous <Continuous>  folic acid 1 milliGRAM(s) Oral daily  furosemide    Tablet 40 milliGRAM(s) Oral daily  ibuprofen  Tablet 400 milliGRAM(s) Oral three times a day PRN  lactulose Retention Enema 200 Gram(s) Rectal two times a day  LORazepam   Injectable 0.5 milliGRAM(s) IV Push every 12 hours  LORazepam   Injectable   IV Push   LORazepam   Injectable 2 milliGRAM(s) IV Push every 2 hours PRN  methadone    Tablet 110 milliGRAM(s) Oral daily  morphine  - Injectable 2 milliGRAM(s) IV Push every 6 hours PRN  multivitamin/minerals 1 Tablet(s) Oral daily  nicotine - 21 mG/24Hr(s) Patch 1 patch Transdermal daily  Parenteral Nutrition - Adult 1 Each TPN Continuous <Continuous>  spironolactone 100 milliGRAM(s) Oral daily  thiamine 100 milliGRAM(s) Oral daily  traMADol 50 milliGRAM(s) Oral two times a day PRN      Objective:  T(F): 96.5 (06-10-18 @ 06:07), Max: 98.6 (06-09-18 @ 21:17)  HR: 94 (06-10-18 @ 06:07) (94 - 112)  BP: 122/89 (06-10-18 @ 06:07) (118/80 - 129/88)  RR: 18 (06-10-18 @ 06:07) (18 - 19)  SpO2: 95% (06-10-18 @ 09:00) (95% - 95%)    PHYSICAL EXAM  Constitutional:Well-developed, well nourished  Eyes:ALONSO, EOMI  Ear/Nose/Throat: no oral lesion, no sinus tenderness on percussion	  Neck:no JVD, no lymphadenopathy, supple  Respiratory: CTA clarissa  Cardiovascular: S1S2 RRR, no murmurs  Gastrointestinal:soft, (+) BS, no HSM  Extremities:no e/e/c    06-10    123<L>  |  82<L>  |  12  ----------------------------<  108<H>  4.2   |  26  |  0.6<L>    Mg     1.7     06-10    TPro  6.7  /  Alb  2.6<L>  /  TBili  0.9  /  DBili  0.6<H>  /  AST  458<H>  /  ALT  238<H>  /  AlkPhos  282<H>  06-10                            12.2   9.70  )-----------( 216      ( 10 Tim 2018 07:44 )             37.9           Culture - Blood (collected 08 Jun 2018 07:38)  Source: .Blood None  Preliminary Report (09 Jun 2018 16:01):    No growth to date.    Culture - Blood (collected 07 Jun 2018 18:54)  Source: .Blood None  Preliminary Report (09 Jun 2018 01:02):    No growth to date.    Culture - Acid Fast - Body Fluid w/Smear (collected 07 Jun 2018 08:01)  Source: .Body Fluid Peritoneal Fluid    Culture - Body Fluid with Gram Stain (collected 07 Jun 2018 08:01)  Source: .Body Fluid Abdominal Fluid  Gram Stain (08 Jun 2018 02:41):    polymorphonuclear leukocytes seen per low power field    No organisms seen per oil power field    by cytocentrifuge  Preliminary Report (08 Jun 2018 18:29):    No growth infectious diseases progress note:  JACINTO GAMEZ is a 47yMale patient    HYPONATREMIA;ASCITES        ROS:  CONSTITUTIONAL:  Negative fever or chills, feels well, good appetite  EYES:  Negative  blurry vision or double vision  CARDIOVASCULAR:  Negative for chest pain or palpitations  RESPIRATORY:  Negative for cough, wheezing, or SOB   GASTROINTESTINAL:  Negative for nausea, vomiting, diarrhea, constipation, or abdominal pain  GENITOURINARY:  Negative frequency, urgency or dysuria  NEUROLOGIC:  No headache, confusion, dizziness, lightheadedness    Allergies    No Known Allergies    Intolerances        ANTIBIOTICS/RELEVANT:  antimicrobials  cefTRIAXone   IVPB 2 Gram(s) IV Intermittent every 24 hours  darunavir 600 milliGRAM(s) Oral every 12 hours  etravirine 200 milliGRAM(s) Oral every 12 hours  raltegravir Tablet 400 milliGRAM(s) Oral every 12 hours  rifaximin 550 milliGRAM(s) Oral two times a day  ritonavir Tablet 100 milliGRAM(s) Oral every 12 hours    immunologic:    OTHER:  dronabinol 10 milliGRAM(s) Oral two times a day  enoxaparin Injectable 30 milliGRAM(s) SubCutaneous daily  fat emulsion (Plant Based) 20% Infusion 1.4 Gm/kG/Day IV Continuous <Continuous>  folic acid 1 milliGRAM(s) Oral daily  furosemide    Tablet 40 milliGRAM(s) Oral daily  ibuprofen  Tablet 400 milliGRAM(s) Oral three times a day PRN  lactulose Retention Enema 200 Gram(s) Rectal two times a day  LORazepam   Injectable 0.5 milliGRAM(s) IV Push every 12 hours  LORazepam   Injectable   IV Push   LORazepam   Injectable 2 milliGRAM(s) IV Push every 2 hours PRN  methadone    Tablet 110 milliGRAM(s) Oral daily  morphine  - Injectable 2 milliGRAM(s) IV Push every 6 hours PRN  multivitamin/minerals 1 Tablet(s) Oral daily  nicotine - 21 mG/24Hr(s) Patch 1 patch Transdermal daily  Parenteral Nutrition - Adult 1 Each TPN Continuous <Continuous>  spironolactone 100 milliGRAM(s) Oral daily  thiamine 100 milliGRAM(s) Oral daily  traMADol 50 milliGRAM(s) Oral two times a day PRN      Objective:  T(F): 96.5 (06-10-18 @ 06:07), Max: 98.6 (06-09-18 @ 21:17)  HR: 94 (06-10-18 @ 06:07) (94 - 112)  BP: 122/89 (06-10-18 @ 06:07) (118/80 - 129/88)  RR: 18 (06-10-18 @ 06:07) (18 - 19)  SpO2: 95% (06-10-18 @ 09:00) (95% - 95%)    PHYSICAL EXAM  Constitutional:Well-developed, well nourished  Eyes:ALONSO, EOMI  Ear/Nose/Throat: no oral lesion, no sinus tenderness on percussion	  Neck:no JVD, no lymphadenopathy, supple  Respiratory: CTA clarissa  Cardiovascular: S1S2 RRR, no murmurs  Gastrointestinal:soft, (+) BS, Distended, ascites present  Extremities:no e/e/c    06-10    123<L>  |  82<L>  |  12  ----------------------------<  108<H>  4.2   |  26  |  0.6<L>    Mg     1.7     06-10    TPro  6.7  /  Alb  2.6<L>  /  TBili  0.9  /  DBili  0.6<H>  /  AST  458<H>  /  ALT  238<H>  /  AlkPhos  282<H>  06-10                            12.2   9.70  )-----------( 216      ( 10 Tim 2018 07:44 )             37.9           Culture - Blood (collected 08 Jun 2018 07:38)  Source: .Blood None  Preliminary Report (09 Jun 2018 16:01):    No growth to date.    Culture - Blood (collected 07 Jun 2018 18:54)  Source: .Blood None  Preliminary Report (09 Jun 2018 01:02):    No growth to date.    Culture - Acid Fast - Body Fluid w/Smear (collected 07 Jun 2018 08:01)  Source: .Body Fluid Peritoneal Fluid    Culture - Body Fluid with Gram Stain (collected 07 Jun 2018 08:01)  Source: .Body Fluid Abdominal Fluid  Gram Stain (08 Jun 2018 02:41):    polymorphonuclear leukocytes seen per low power field    No organisms seen per oil power field    by cytocentrifuge  Preliminary Report (08 Jun 2018 18:29):    No growth

## 2018-06-10 NOTE — PROGRESS NOTE ADULT - SUBJECTIVE AND OBJECTIVE BOX
Nephrology progress note    Patient is seen and examined, events over the last 24 h noted .  discussed with wife at bedside   lethargic  Ascites recurred     Allergies:  No Known Allergies    Hospital Medications:   MEDICATIONS  (STANDING):  cefTRIAXone   IVPB 2 Gram(s) IV Intermittent every 24 hours  darunavir 600 milliGRAM(s) Oral every 12 hours  dronabinol 10 milliGRAM(s) Oral two times a day  enoxaparin Injectable 30 milliGRAM(s) SubCutaneous daily  etravirine 200 milliGRAM(s) Oral every 12 hours  fat emulsion (Plant Based) 20% Infusion 1.4 Gm/kG/Day (31.063 mL/Hr) IV Continuous <Continuous>  folic acid 1 milliGRAM(s) Oral daily  furosemide    Tablet 40 milliGRAM(s) Oral daily  lactulose Retention Enema 200 Gram(s) Rectal two times a day  LORazepam   Injectable 0.5 milliGRAM(s) IV Push every 12 hours  LORazepam   Injectable   IV Push   methadone    Tablet 110 milliGRAM(s) Oral daily  multivitamin/minerals 1 Tablet(s) Oral daily  nicotine - 21 mG/24Hr(s) Patch 1 patch Transdermal daily  Parenteral Nutrition - Adult 1 Each (65 mL/Hr) TPN Continuous <Continuous>  raltegravir Tablet 400 milliGRAM(s) Oral every 12 hours  rifaximin 550 milliGRAM(s) Oral two times a day  ritonavir Tablet 100 milliGRAM(s) Oral every 12 hours  spironolactone 100 milliGRAM(s) Oral daily  thiamine 100 milliGRAM(s) Oral daily        VITALS:  T(F): 96.5 (06-10-18 @ 06:07), Max: 98.6 (06-09-18 @ 21:17)  HR: 94 (06-10-18 @ 06:07)  BP: 122/89 (06-10-18 @ 06:07)  RR: 18 (06-10-18 @ 06:07)  SpO2: 95% (06-10-18 @ 09:00)      06-10 @ 07:01  -  06-10 @ 10:03  --------------------------------------------------------  IN: 288 mL / OUT: 400 mL / NET: -112 mL          PHYSICAL EXAM:  Constitutional: lethargic cachectic  HEENT: anicteric sclera, oropharynx clear, MMM  Neck: No JVD  Respiratory: CTAB, no wheezes, rales or rhonchi  Cardiovascular: S1, S2, RRR  Gastrointestinal: distended abdomen   Extremities: No cyanosis or clubbing. No peripheral edema  :  No rivera.   Skin: No rashes    LABS:  06-10    123<L>  |  82<L>  |  12  ----------------------------<  108<H>  4.2   |  26  |  0.6<L>    SODIUM TREND:  Sodium 123 [06-10 @ 07:44]  Sodium 125 [06-09 @ 08:11]  Sodium 127 [06-08 @ 07:38]  Sodium 125 [06-07 @ 08:48]  Sodium 124 [06-06 @ 21:47]  Sodium 125 [06-06 @ 11:55]  Sodium 123 [06-05 @ 20:43]    Ca    8.8      10 Tim 2018 07:44  Phos  3.3     06-10  Mg     1.7     06-10    TPro  6.7  /  Alb  2.6<L>  /  TBili  0.9  /  DBili  0.6<H>  /  AST  458<H>  /  ALT  238<H>  /  AlkPhos  282<H>  06-10                          12.2   9.70  )-----------( 216      ( 10 Tim 2018 07:44 )             37.9       Urine Studies:    Osmolality, Random Urine: 433 mos/kg (06-07 @ 01:59)  Sodium, Random Urine: 20.0 mmoL/L (06-07 @ 01:58)  Creatinine, Random Urine: 151 mg/dL (06-07 @ 01:58)  Chloride, Random Urine: 23 (06-07 @ 01:58)    RADIOLOGY & ADDITIONAL STUDIES:

## 2018-06-10 NOTE — PROGRESS NOTE ADULT - ASSESSMENT
Pt with AIDS, cirrhosis, hyponatremia, metastatic hepatocellular ca s/p L lobe resection    S/P paracentesis    On: cefTRIAXone  2 Gram(s) IV Intermittent every 24 hours  darunavir 600 mg Oral every 12 hours  etravirine 200 mg Oral every 12 hours  raltegravir 400 mg Oral every 12 hours  rifaximin 550 mg Oral two times a day for hepatic encephalopathy  ritonavir 100 mg Oral every 12 hours    PLAN  Continue current meds  Monitor Na  GI, renal & nutrition F/U Pt with AIDS, cirrhosis, hyponatremia, metastatic hepatocellular ca s/p L lobe resection    S/P paracentesis    On: cefTRIAXone  2 Gram(s) IV Intermittent every 24 hours  darunavir 600 mg Oral every 12 hours  etravirine 200 mg Oral every 12 hours  raltegravir 400 mg Oral every 12 hours  rifaximin 550 mg Oral two times a day for hepatic encephalopathy  ritonavir 100 mg Oral every 12 hours    PLAN  Continue current meds  Monitor Na  Recall GI to re-tap abdomen

## 2018-06-11 LAB
ANION GAP SERPL CALC-SCNC: 15 MMOL/L — HIGH (ref 7–14)
BUN SERPL-MCNC: 14 MG/DL — SIGNIFICANT CHANGE UP (ref 10–20)
CALCIUM SERPL-MCNC: 8.5 MG/DL — SIGNIFICANT CHANGE UP (ref 8.5–10.1)
CHLORIDE SERPL-SCNC: 83 MMOL/L — LOW (ref 98–110)
CO2 SERPL-SCNC: 23 MMOL/L — SIGNIFICANT CHANGE UP (ref 17–32)
CREAT SERPL-MCNC: 0.5 MG/DL — LOW (ref 0.7–1.5)
GLUCOSE SERPL-MCNC: 84 MG/DL — SIGNIFICANT CHANGE UP (ref 70–99)
HCT VFR BLD CALC: 35.1 % — LOW (ref 42–52)
HGB BLD-MCNC: 11.5 G/DL — LOW (ref 14–18)
INR BLD: 1.41 RATIO — HIGH (ref 0.65–1.3)
MAGNESIUM SERPL-MCNC: 1.5 MG/DL — LOW (ref 1.8–2.4)
MCHC RBC-ENTMCNC: 27.4 PG — SIGNIFICANT CHANGE UP (ref 27–31)
MCHC RBC-ENTMCNC: 32.8 G/DL — SIGNIFICANT CHANGE UP (ref 32–37)
MCV RBC AUTO: 83.8 FL — SIGNIFICANT CHANGE UP (ref 80–94)
NRBC # BLD: 0 /100 WBCS — SIGNIFICANT CHANGE UP (ref 0–0)
PLATELET # BLD AUTO: 206 K/UL — SIGNIFICANT CHANGE UP (ref 130–400)
POTASSIUM SERPL-MCNC: 4.2 MMOL/L — SIGNIFICANT CHANGE UP (ref 3.5–5)
POTASSIUM SERPL-SCNC: 4.2 MMOL/L — SIGNIFICANT CHANGE UP (ref 3.5–5)
PROTHROM AB SERPL-ACNC: 15.3 SEC — HIGH (ref 9.95–12.87)
RBC # BLD: 4.19 M/UL — LOW (ref 4.7–6.1)
RBC # FLD: 15.5 % — HIGH (ref 11.5–14.5)
SODIUM SERPL-SCNC: 121 MMOL/L — LOW (ref 135–146)
WBC # BLD: 11.26 K/UL — HIGH (ref 4.8–10.8)
WBC # FLD AUTO: 11.26 K/UL — HIGH (ref 4.8–10.8)

## 2018-06-11 RX ORDER — I.V. FAT EMULSION 20 G/100ML
1.4 EMULSION INTRAVENOUS
Qty: 99.4 | Refills: 0 | Status: DISCONTINUED | OUTPATIENT
Start: 2018-06-11 | End: 2018-06-11

## 2018-06-11 RX ORDER — ELECTROLYTE SOLUTION,INJ
1 VIAL (ML) INTRAVENOUS
Qty: 0 | Refills: 0 | Status: DISCONTINUED | OUTPATIENT
Start: 2018-06-11 | End: 2018-06-11

## 2018-06-11 RX ORDER — SIMETHICONE 80 MG/1
80 TABLET, CHEWABLE ORAL ONCE
Qty: 0 | Refills: 0 | Status: COMPLETED | OUTPATIENT
Start: 2018-06-11 | End: 2018-06-11

## 2018-06-11 RX ORDER — MORPHINE SULFATE 50 MG/1
6 CAPSULE, EXTENDED RELEASE ORAL EVERY 4 HOURS
Qty: 0 | Refills: 0 | Status: DISCONTINUED | OUTPATIENT
Start: 2018-06-11 | End: 2018-06-13

## 2018-06-11 RX ORDER — LACTULOSE 10 G/15ML
30 SOLUTION ORAL EVERY 6 HOURS
Qty: 0 | Refills: 0 | Status: DISCONTINUED | OUTPATIENT
Start: 2018-06-11 | End: 2018-06-12

## 2018-06-11 RX ORDER — MORPHINE SULFATE 50 MG/1
2 CAPSULE, EXTENDED RELEASE ORAL ONCE
Qty: 0 | Refills: 0 | Status: DISCONTINUED | OUTPATIENT
Start: 2018-06-11 | End: 2018-06-11

## 2018-06-11 RX ORDER — MORPHINE SULFATE 50 MG/1
4 CAPSULE, EXTENDED RELEASE ORAL EVERY 6 HOURS
Qty: 0 | Refills: 0 | Status: DISCONTINUED | OUTPATIENT
Start: 2018-06-11 | End: 2018-06-11

## 2018-06-11 RX ADMIN — Medication 0.25 MILLIGRAM(S): at 01:20

## 2018-06-11 RX ADMIN — I.V. FAT EMULSION 31.06 GM/KG/DAY: 20 EMULSION INTRAVENOUS at 20:59

## 2018-06-11 RX ADMIN — Medication 2 MILLIGRAM(S): at 23:02

## 2018-06-11 RX ADMIN — LACTULOSE 30 GRAM(S): 10 SOLUTION ORAL at 11:49

## 2018-06-11 RX ADMIN — Medication 2 MILLIGRAM(S): at 20:27

## 2018-06-11 RX ADMIN — MORPHINE SULFATE 2 MILLIGRAM(S): 50 CAPSULE, EXTENDED RELEASE ORAL at 06:33

## 2018-06-11 RX ADMIN — Medication 2 MILLIGRAM(S): at 10:56

## 2018-06-11 RX ADMIN — MORPHINE SULFATE 2 MILLIGRAM(S): 50 CAPSULE, EXTENDED RELEASE ORAL at 06:01

## 2018-06-11 RX ADMIN — METHADONE HYDROCHLORIDE 110 MILLIGRAM(S): 40 TABLET ORAL at 11:48

## 2018-06-11 RX ADMIN — MORPHINE SULFATE 2 MILLIGRAM(S): 50 CAPSULE, EXTENDED RELEASE ORAL at 02:51

## 2018-06-11 RX ADMIN — Medication 2 MILLIGRAM(S): at 13:52

## 2018-06-11 RX ADMIN — MORPHINE SULFATE 2 MILLIGRAM(S): 50 CAPSULE, EXTENDED RELEASE ORAL at 17:46

## 2018-06-11 RX ADMIN — MORPHINE SULFATE 2 MILLIGRAM(S): 50 CAPSULE, EXTENDED RELEASE ORAL at 03:00

## 2018-06-11 RX ADMIN — MORPHINE SULFATE 2 MILLIGRAM(S): 50 CAPSULE, EXTENDED RELEASE ORAL at 07:48

## 2018-06-11 RX ADMIN — MORPHINE SULFATE 4 MILLIGRAM(S): 50 CAPSULE, EXTENDED RELEASE ORAL at 11:48

## 2018-06-11 RX ADMIN — CEFTRIAXONE 100 GRAM(S): 500 INJECTION, POWDER, FOR SOLUTION INTRAMUSCULAR; INTRAVENOUS at 11:50

## 2018-06-11 RX ADMIN — Medication 1 PATCH: at 11:51

## 2018-06-11 RX ADMIN — TRAMADOL HYDROCHLORIDE 50 MILLIGRAM(S): 50 TABLET ORAL at 03:39

## 2018-06-11 RX ADMIN — TRAMADOL HYDROCHLORIDE 50 MILLIGRAM(S): 50 TABLET ORAL at 01:53

## 2018-06-11 RX ADMIN — Medication 1 EACH: at 20:58

## 2018-06-11 RX ADMIN — Medication 0.5 MILLIGRAM(S): at 18:29

## 2018-06-11 RX ADMIN — LACTULOSE 30 GRAM(S): 10 SOLUTION ORAL at 23:04

## 2018-06-11 RX ADMIN — ENOXAPARIN SODIUM 30 MILLIGRAM(S): 100 INJECTION SUBCUTANEOUS at 11:51

## 2018-06-11 RX ADMIN — SIMETHICONE 80 MILLIGRAM(S): 80 TABLET, CHEWABLE ORAL at 23:05

## 2018-06-11 RX ADMIN — MORPHINE SULFATE 6 MILLIGRAM(S): 50 CAPSULE, EXTENDED RELEASE ORAL at 18:29

## 2018-06-11 RX ADMIN — MORPHINE SULFATE 2 MILLIGRAM(S): 50 CAPSULE, EXTENDED RELEASE ORAL at 15:51

## 2018-06-11 RX ADMIN — MORPHINE SULFATE 2 MILLIGRAM(S): 50 CAPSULE, EXTENDED RELEASE ORAL at 01:54

## 2018-06-11 RX ADMIN — MORPHINE SULFATE 6 MILLIGRAM(S): 50 CAPSULE, EXTENDED RELEASE ORAL at 21:26

## 2018-06-11 RX ADMIN — Medication 0.5 MILLIGRAM(S): at 04:43

## 2018-06-11 NOTE — PROGRESS NOTE ADULT - SUBJECTIVE AND OBJECTIVE BOX
Nephrology progress note    Patient is seen and examined, events over the last 24 h noted .     Allergies:  No Known Allergies    Hospital Medications:   MEDICATIONS  (STANDING):  cefTRIAXone   IVPB 2 Gram(s) IV Intermittent every 24 hours  darunavir 600 milliGRAM(s) Oral every 12 hours  dronabinol 10 milliGRAM(s) Oral two times a day  enoxaparin Injectable 30 milliGRAM(s) SubCutaneous daily  etravirine 200 milliGRAM(s) Oral every 12 hours  folic acid 1 milliGRAM(s) Oral daily  furosemide    Tablet 40 milliGRAM(s) Oral daily  lactulose Syrup 30 Gram(s) Oral every 6 hours  LORazepam   Injectable 0.5 milliGRAM(s) IV Push every 12 hours  LORazepam   Injectable   IV Push   methadone    Tablet 110 milliGRAM(s) Oral daily  multivitamin/minerals 1 Tablet(s) Oral daily  nicotine - 21 mG/24Hr(s) Patch 1 patch Transdermal daily  Parenteral Nutrition - Adult 1 Each (65 mL/Hr) TPN Continuous <Continuous>  raltegravir Tablet 400 milliGRAM(s) Oral every 12 hours  rifaximin 550 milliGRAM(s) Oral two times a day  ritonavir Tablet 100 milliGRAM(s) Oral every 12 hours  spironolactone 100 milliGRAM(s) Oral daily  thiamine 100 milliGRAM(s) Oral daily        VITALS:  T(F): 97.1 (06-11-18 @ 07:04), Max: 98.5 (06-10-18 @ 12:20)  HR: 101 (06-11-18 @ 07:04)  BP: 123/88 (06-11-18 @ 07:04)  RR: 18 (06-11-18 @ 07:04)  SpO2: --  Wt(kg): --    06-10 @ 07:01  -  06-11 @ 07:00  --------------------------------------------------------  IN: 1065 mL / OUT: 800 mL / NET: 265 mL          PHYSICAL EXAM:  Constitutional: NAD  HEENT: anicteric sclera, oropharynx clear, MMM  Neck: No JVD  Respiratory: CTAB, no wheezes, rales or rhonchi  Cardiovascular: S1, S2, RRR  Gastrointestinal: BS+, soft, NT/ND  Extremities: No cyanosis or clubbing. No peripheral edema  :  No rivera.   Skin: No rashes    LABS:  06-10    123<L>  |  82<L>  |  12  ----------------------------<  108<H>  4.2   |  26  |  0.6<L>    Ca    8.8      10 Tim 2018 07:44  Phos  3.3     06-10  Mg     1.7     06-10    TPro  6.7  /  Alb  2.6<L>  /  TBili  0.9  /  DBili  0.6<H>  /  AST  458<H>  /  ALT  238<H>  /  AlkPhos  282<H>  06-10                          12.2   9.70  )-----------( 216      ( 10 Tim 2018 07:44 )             37.9       Urine Studies:    Osmolality, Random Urine: 433 mos/kg (06-07 @ 01:59)  Sodium, Random Urine: 20.0 mmoL/L (06-07 @ 01:58)  Creatinine, Random Urine: 151 mg/dL (06-07 @ 01:58)  Chloride, Random Urine: 23 (06-07 @ 01:58)    RADIOLOGY & ADDITIONAL STUDIES:  < from: Xray Chest 1 View-PORTABLE IMMEDIATE (06.11.18 @ 08:45) >  Impression:    Low lung volumes with bibasilar opacities/atelectasis.  < end of copied text >    < from: CT Head No Cont (06.08.18 @ 10:12) >  IMPRESSION:   No evidence of acute intracranial pathology.  Right mastoid air cell opacification.  < end of copied text > Nephrology progress note    Patient is seen and examined, events over the last 24 h noted. Patient is lethargic and unable to obtain ROS.     Allergies:  No Known Allergies    Hospital Medications:   MEDICATIONS  (STANDING):  cefTRIAXone   IVPB 2 Gram(s) IV Intermittent every 24 hours  darunavir 600 milliGRAM(s) Oral every 12 hours  dronabinol 10 milliGRAM(s) Oral two times a day  enoxaparin Injectable 30 milliGRAM(s) SubCutaneous daily  etravirine 200 milliGRAM(s) Oral every 12 hours  folic acid 1 milliGRAM(s) Oral daily  furosemide    Tablet 40 milliGRAM(s) Oral daily  lactulose Syrup 30 Gram(s) Oral every 6 hours  LORazepam   Injectable 0.5 milliGRAM(s) IV Push every 12 hours  LORazepam   Injectable   IV Push   methadone    Tablet 110 milliGRAM(s) Oral daily  multivitamin/minerals 1 Tablet(s) Oral daily  nicotine - 21 mG/24Hr(s) Patch 1 patch Transdermal daily  Parenteral Nutrition - Adult 1 Each (65 mL/Hr) TPN Continuous <Continuous>  raltegravir Tablet 400 milliGRAM(s) Oral every 12 hours  rifaximin 550 milliGRAM(s) Oral two times a day  ritonavir Tablet 100 milliGRAM(s) Oral every 12 hours  spironolactone 100 milliGRAM(s) Oral daily  thiamine 100 milliGRAM(s) Oral daily        VITALS:  T(F): 97.1 (06-11-18 @ 07:04), Max: 98.5 (06-10-18 @ 12:20)  HR: 101 (06-11-18 @ 07:04)  BP: 123/88 (06-11-18 @ 07:04)  RR: 18 (06-11-18 @ 07:04)  SpO2: --  Wt(kg): --    06-10 @ 07:01  -  06-11 @ 07:00  --------------------------------------------------------  IN: 1065 mL / OUT: 800 mL / NET: 265 mL          PHYSICAL EXAM:  Constitutional: NAD, A&Ox0  HEENT: anicteric sclera, oropharynx clear, MMM  Neck: No JVD  Respiratory: CTAB, no wheezes, rales or rhonchi  Cardiovascular: S1, S2, RRR  Gastrointestinal: extremely distended, rigid, bowel sounds absent  Extremities: No cyanosis or clubbing. No peripheral edema  :  No rivera.   Skin: No rashes    LABS:  06-10    123<L>  |  82<L>  |  12  ----------------------------<  108<H>  4.2   |  26  |  0.6<L>    Ca    8.8      10 Tim 2018 07:44  Phos  3.3     06-10  Mg     1.7     06-10    TPro  6.7  /  Alb  2.6<L>  /  TBili  0.9  /  DBili  0.6<H>  /  AST  458<H>  /  ALT  238<H>  /  AlkPhos  282<H>  06-10                          12.2   9.70  )-----------( 216      ( 10 Tim 2018 07:44 )             37.9       Urine Studies:    Osmolality, Random Urine: 433 mos/kg (06-07 @ 01:59)  Sodium, Random Urine: 20.0 mmoL/L (06-07 @ 01:58)  Creatinine, Random Urine: 151 mg/dL (06-07 @ 01:58)  Chloride, Random Urine: 23 (06-07 @ 01:58)    RADIOLOGY & ADDITIONAL STUDIES:  < from: Xray Chest 1 View-PORTABLE IMMEDIATE (06.11.18 @ 08:45) >  Impression:    Low lung volumes with bibasilar opacities/atelectasis.  < end of copied text >    < from: CT Head No Cont (06.08.18 @ 10:12) >  IMPRESSION:   No evidence of acute intracranial pathology.  Right mastoid air cell opacification.  < end of copied text >

## 2018-06-11 NOTE — PROGRESS NOTE ADULT - SUBJECTIVE AND OBJECTIVE BOX
FRANCO JACINTO  47y, Male      OVERNIGHT EVENTS:    Wife at bedside. Pt is lethargic but does respond. Has abdominal pain.    VITALS:  T(F): 97.1, Max: 98.5 (06-10-18 @ 12:20)  HR: 101  BP: 123/88  RR: 18Vital Signs Last 24 Hrs  T(C): 36.2 (11 Jun 2018 07:04), Max: 36.9 (10 Tim 2018 12:20)  T(F): 97.1 (11 Jun 2018 07:04), Max: 98.5 (10 Tim 2018 12:20)  HR: 101 (11 Jun 2018 07:04) (100 - 113)  BP: 123/88 (11 Jun 2018 07:04) (121/79 - 123/90)  BP(mean): --  RR: 18 (11 Jun 2018 07:04) (17 - 18)  SpO2: --    TESTS & MEASUREMENTS:                        12.2   9.70  )-----------( 216      ( 10 Tim 2018 07:44 )             37.9     06-10    123<L>  |  82<L>  |  12  ----------------------------<  108<H>  4.2   |  26  |  0.6<L>    Ca    8.8      10 Tim 2018 07:44  Phos  3.3     06-10  Mg     1.7     06-10    TPro  6.7  /  Alb  2.6<L>  /  TBili  0.9  /  DBili  0.6<H>  /  AST  458<H>  /  ALT  238<H>  /  AlkPhos  282<H>  06-10    LIVER FUNCTIONS - ( 10 Tim 2018 07:44 )  Alb: 2.6 g/dL / Pro: 6.7 g/dL / ALK PHOS: 282 U/L / ALT: 238 U/L / AST: 458 U/L / GGT: x             Culture - Blood (collected 06-08-18 @ 07:38)  Source: .Blood None  Preliminary Report (06-09-18 @ 16:01):    No growth to date.    Culture - Blood (collected 06-07-18 @ 18:54)  Source: .Blood None  Preliminary Report (06-09-18 @ 01:02):    No growth to date.    Culture - Fungal, Body Fluid (collected 06-07-18 @ 08:01)  Source: .Body Fluid Peritoneal Fluid  Preliminary Report (06-11-18 @ 09:19):    Testing in progress    Culture - Acid Fast - Body Fluid w/Smear (collected 06-07-18 @ 08:01)  Source: .Body Fluid Peritoneal Fluid    Culture - Body Fluid with Gram Stain (collected 06-07-18 @ 08:01)  Source: .Body Fluid Abdominal Fluid  Gram Stain (06-08-18 @ 02:41):    polymorphonuclear leukocytes seen per low power field    No organisms seen per oil power field    by cytocentrifuge  Preliminary Report (06-08-18 @ 18:29):    No growth            RADIOLOGY & ADDITIONAL TESTS:    ANTIBIOTICS:  cefTRIAXone   IVPB 2 Gram(s) IV Intermittent every 24 hours  darunavir 600 milliGRAM(s) Oral every 12 hours  etravirine 200 milliGRAM(s) Oral every 12 hours  raltegravir Tablet 400 milliGRAM(s) Oral every 12 hours  rifaximin 550 milliGRAM(s) Oral two times a day  ritonavir Tablet 100 milliGRAM(s) Oral every 12 hours

## 2018-06-11 NOTE — CONSULT NOTE ADULT - SUBJECTIVE AND OBJECTIVE BOX
INTERVENTIONAL RADIOLOGY CONSULT:     Procedure Requested: therapeutic paracentesis     HPI:  46 yo M w/ h/o HIV on HAART therapy, liver cirrhosis/liver carcinoma, Hepatitis C infection s/p treatment, alcohol abuse, withdrawal seizures in past, h/o heroin abuse-on methadone program since 2 weeks now  sent in Dr Reilly from his office for therapeutic paracentesis.    Pt went to see Dr Reilly for his liver cancer. He has been having progressive abdominal distention for about 1 month. It is associated with abdominal pain, nausea/vomiting, sometimes fever/chills, b/l LE edema. He has been having difficulty walking around due to the belly distension so Dr Reilly thought the paracentesis would help him and sent him here. The abdominal pain is manly in upper abdomen, severe per pt, per wife it chronic but getting worse. He is going to see pain management on Friday. He has been having nausea/vomiting daily for past few months, yesterday 4 times, non-bloody. He is also having fever/chills on and off for about a month per wife. Wife also mentioned him being a little confused during day. On my assessment, pt is AAO*3.      In ED, 3 L of ascitic fluid was removed and pt was supposed to be d/luis from ED but due to low sodium on BMP, pt is being admitted.     pt denies any dizziness, numbness. (06 Jun 2018 03:34)      PAST MEDICAL & SURGICAL HISTORY:  PPD+ (purified protein derivative positive)  Hepatitis-C: s/p treatment  Nicotine dependence  HIV (human immunodeficiency virus infection): on HAART  EtOH dependence  H/O resection of liver: partial      MEDICATIONS  (STANDING):  cefTRIAXone   IVPB 2 Gram(s) IV Intermittent every 24 hours  darunavir 600 milliGRAM(s) Oral every 12 hours  dronabinol 10 milliGRAM(s) Oral two times a day  enoxaparin Injectable 30 milliGRAM(s) SubCutaneous daily  etravirine 200 milliGRAM(s) Oral every 12 hours  folic acid 1 milliGRAM(s) Oral daily  furosemide    Tablet 40 milliGRAM(s) Oral daily  lactulose Syrup 20 Gram(s) Oral two times a day  LORazepam   Injectable 0.5 milliGRAM(s) IV Push every 12 hours  LORazepam   Injectable   IV Push   methadone    Tablet 110 milliGRAM(s) Oral daily  multivitamin/minerals 1 Tablet(s) Oral daily  nicotine - 21 mG/24Hr(s) Patch 1 patch Transdermal daily  Parenteral Nutrition - Adult 1 Each (65 mL/Hr) TPN Continuous <Continuous>  raltegravir Tablet 400 milliGRAM(s) Oral every 12 hours  rifaximin 550 milliGRAM(s) Oral two times a day  ritonavir Tablet 100 milliGRAM(s) Oral every 12 hours  spironolactone 100 milliGRAM(s) Oral daily  thiamine 100 milliGRAM(s) Oral daily    MEDICATIONS  (PRN):  ibuprofen  Tablet 400 milliGRAM(s) Oral three times a day PRN Temp > 100.4  LORazepam   Injectable 2 milliGRAM(s) IV Push every 2 hours PRN CIWA-Ar score increase by 2 points and a total score of 7 or less  morphine  - Injectable 2 milliGRAM(s) IV Push every 4 hours PRN Moderate Pain (4 - 6)  traMADol 50 milliGRAM(s) Oral two times a day PRN Severe Pain (7 - 10)      Allergies    No Known Allergies    Intolerances        Social History:   Smoking: Yes [ ]  No [ ]   ______pk yrs  ETOH  Yes [ ]  No [ ]  Social [ ]  DRUGS:  Yes [ ]  No [ ]  if so what______________    FAMILY HISTORY:  No pertinent family history in first degree relatives      Physical Exam:   Vital Signs Last 24 Hrs  T(C): 36.2 (11 Jun 2018 07:04), Max: 36.9 (10 Tim 2018 12:20)  T(F): 97.1 (11 Jun 2018 07:04), Max: 98.5 (10 Tim 2018 12:20)  HR: 101 (11 Jun 2018 07:04) (100 - 113)  BP: 123/88 (11 Jun 2018 07:04) (121/79 - 123/90)  BP(mean): --  RR: 18 (11 Jun 2018 07:04) (17 - 18)  SpO2: 95% (10 Tim 2018 09:00) (95% - 95%)      Labs:                         12.2   9.70  )-----------( 216      ( 10 Tim 2018 07:44 )             37.9     06-10    123<L>  |  82<L>  |  12  ----------------------------<  108<H>  4.2   |  26  |  0.6<L>    Ca    8.8      10 Tim 2018 07:44  Phos  3.3     06-10  Mg     1.7     06-10    TPro  6.7  /  Alb  2.6<L>  /  TBili  0.9  /  DBili  0.6<H>  /  AST  458<H>  /  ALT  238<H>  /  AlkPhos  282<H>  06-10        Pertinent labs:                      12.2   9.70  )-----------( 216      ( 10 Tim 2018 07:44 )             37.9       06-10    123<L>  |  82<L>  |  12  ----------------------------<  108<H>  4.2   |  26  |  0.6<L>    Ca    8.8      10 Tim 2018 07:44  Phos  3.3     06-10  Mg     1.7     06-10    TPro  6.7  /  Alb  2.6<L>  /  TBili  0.9  /  DBili  0.6<H>  /  AST  458<H>  /  ALT  238<H>  /  AlkPhos  282<H>  06-10    Radiology & Additional Studies:     pending 4 quadrant ultrasound results      ASSESSMENT/ PLAN:   - consulted for therapeutic paracentesis due to patients history of recurrent ascites due to liver cirrhosis and HCC  - diagnostic and therapeutic paracentesis done on 6/7/18 - 3.87L removed  - discussed with primary team - will order 4 quadrant ultrasound to assess fluid collection before scheduling procedure  - if enough fluid for safe drainage will schedule paracentesis for today 6/11    Thank you for the courtesy of this consult, please call [phone] with any further questions.

## 2018-06-11 NOTE — PROGRESS NOTE ADULT - ASSESSMENT
ASSESSMENT/PLAN  HIV on HAART  - hep C  - HCC with peritoneal mets  - decompensated cirrhosis  - active EtOH and drug use  - hepatic encephalopathy  ppn order for today  check bmp/phos/mg and correct lytes

## 2018-06-11 NOTE — PROGRESS NOTE ADULT - SUBJECTIVE AND OBJECTIVE BOX
INTERVENTIONAL RADIOLOGY BRIEF-OPERATIVE NOTE    Procedure: Image guided therapeutic paracentesis.     Pre-Op Diagnosis: Metastatic HCC, Hep C, HIV    Post-Op Diagnosis: Same.    Attending: Darin  Physician Assistant: Macho    Anesthesia (type):  [ ] General Anesthesia  [ ] Sedation  [ ] Spinal Anesthesia  [ x] Local/Regional    Contrast: None    Estimated Blood Loss: <5ml    Condition:   [ ] Critical  [ ] Serious  [x ] Fair   [ ] Good    Findings/Follow up Plan of Care: 3.550L dominic color fluid removed    Specimens Removed: None.    Implants: None    Complications: None    Disposition: no complications, return to floor         Please call Interventional Radiology [phone] with any questions, concerns, or issues.

## 2018-06-11 NOTE — PROGRESS NOTE ADULT - ASSESSMENT
Patient is a 46 YO male with pmh of HIV, liver cirrhosis/carcinoma, hep c infection s/p tx, alcohol abuse, heroin abuse here for therapeutic paracentesis and was found to be hyponatremic    ·	Hyponatremia multifactorial - intravascular volume depletion, liver cirrhosis, low solute intake  ·	sodium worsening   ·	c/w lasix/aldactone   ·	patient remains with decrease po intake, seen by Dr Hi on TPN , adjust salt concentration in nutrition   ·	will need repeat paracentesis with albumin today, IR consulted for procedure   ·	overall prognosis guarded     Please see attending progress note Patient is a 48 YO male with pmh of HIV, liver cirrhosis/carcinoma, hep c infection s/p tx, alcohol abuse, heroin abuse here for therapeutic paracentesis and was found to be hyponatremic    ·	Hyponatremia multifactorial - intravascular volume depletion, liver cirrhosis, low solute intake  ·	sodium worsening   ·	c/w lasix/aldactone   ·	patient remains with decrease po intake, seen by Dr Hi on TPN , adjust salt concentration in nutrition   ·	IR to take for abdominal ultrasound today for possible paracentesis  ·	overall prognosis guarded     Please see attending progress note

## 2018-06-11 NOTE — PROGRESS NOTE ADULT - SUBJECTIVE AND OBJECTIVE BOX
SUBJECTIVE:    Patient is a 47y old Male who presents with a chief complaint of hyponatremia (06 Jun 2018 03:34)    Currently admitted to medicine with the primary diagnosis of Hyponatremia     Today is hospital day 5d. This morning he is resting comfortably in bed and reports no new issues or overnight events.     PAST MEDICAL & SURGICAL HISTORY  PPD+ (purified protein derivative positive)  Hepatitis-C: s/p treatment  Nicotine dependence  HIV (human immunodeficiency virus infection): on HAART  EtOH dependence  H/O resection of liver: partial    SOCIAL HISTORY:  Negative for smoking/alcohol/drug use.     ALLERGIES:  No Known Allergies    MEDICATIONS:  STANDING MEDICATIONS  cefTRIAXone   IVPB 2 Gram(s) IV Intermittent every 24 hours  darunavir 600 milliGRAM(s) Oral every 12 hours  dronabinol 10 milliGRAM(s) Oral two times a day  enoxaparin Injectable 30 milliGRAM(s) SubCutaneous daily  etravirine 200 milliGRAM(s) Oral every 12 hours  folic acid 1 milliGRAM(s) Oral daily  furosemide    Tablet 40 milliGRAM(s) Oral daily  lactulose Syrup 30 Gram(s) Oral every 6 hours  LORazepam   Injectable 0.5 milliGRAM(s) IV Push every 12 hours  LORazepam   Injectable   IV Push   methadone    Tablet 110 milliGRAM(s) Oral daily  multivitamin/minerals 1 Tablet(s) Oral daily  nicotine - 21 mG/24Hr(s) Patch 1 patch Transdermal daily  Parenteral Nutrition - Adult 1 Each TPN Continuous <Continuous>  raltegravir Tablet 400 milliGRAM(s) Oral every 12 hours  rifaximin 550 milliGRAM(s) Oral two times a day  ritonavir Tablet 100 milliGRAM(s) Oral every 12 hours  spironolactone 100 milliGRAM(s) Oral daily  thiamine 100 milliGRAM(s) Oral daily    PRN MEDICATIONS  ibuprofen  Tablet 400 milliGRAM(s) Oral three times a day PRN  LORazepam   Injectable 2 milliGRAM(s) IV Push every 2 hours PRN  morphine  - Injectable 2 milliGRAM(s) IV Push every 4 hours PRN  traMADol 50 milliGRAM(s) Oral two times a day PRN    VITALS:   T(F): 97.1  HR: 101  BP: 123/88  RR: 18  SpO2: --    LABS:                        12.2   9.70  )-----------( 216      ( 10 Tim 2018 07:44 )             37.9     06-10    123<L>  |  82<L>  |  12  ----------------------------<  108<H>  4.2   |  26  |  0.6<L>    Ca    8.8      10 Tim 2018 07:44  Phos  3.3     06-10  Mg     1.7     06-10    TPro  6.7  /  Alb  2.6<L>  /  TBili  0.9  /  DBili  0.6<H>  /  AST  458<H>  /  ALT  238<H>  /  AlkPhos  282<H>  06-10      PHYSICAL EXAM:  GEN: No acute distress  LUNGS: Clear to auscultation bilaterally   HEART: S1/S2 present. RRR.   ABD: non-tender, + distended and firm. Bowel sounds present  EXT: NC/NC/NE/2+PP/RUTH  NEURO: AAOX2, lethargic but arousable

## 2018-06-11 NOTE — PROGRESS NOTE ADULT - ASSESSMENT
IMPRESSION:  Clinically with SBP.  Seems to have responded to Lactulose with encephalopathy better.    RECOMMENDATIONS:  Needs a therapeutic paracentesis TODAY.  Continue with Lactulose and Rifaximin/ Rocephin.  Needs pain management ot see pt.    Extensively discussed with pts wife at bedside.  She would like to take him home.  HOSPICE evaluation.

## 2018-06-11 NOTE — PROGRESS NOTE ADULT - ASSESSMENT
· Assessment		  Patient is a 48 YO male with pmh of HIV, liver cirrhosis/carcinoma, hep c infection s/p tx, alcohol abuse, heroin abuse here for therapeutic paracentesis and was found to be hyponatremic    ·	Hyponatremia multifactorial - intravascular volume depletion, liver cirrhosis, low solute intake  # sodium worsening   #GI noted appreciated, started on lasix/aldactone   # patient remains with decrease po intake, seen by Dr Hi on TPN , adjust salt concentration in nutrition   # will need repeat paracentesis with albumin in AM   #overall prognosis guarded · Assessment		  Patient is a 46 YO male with pmh of HIV, liver cirrhosis/carcinoma, hep c infection s/p tx, alcohol abuse, heroin abuse here for therapeutic paracentesis and was found to be hyponatremic    ·	Hyponatremia multifactorial - intravascular volume depletion, liver cirrhosis, low solute intake  # sodium worsening   #GI noted appreciated, started on lasix/aldactone follow repeat BMP from today   # patient remains with decrease po intake, seen by Dr Hi on TPN , adjust salt concentration in nutrition   # for repeat paracentesis today   # hospice called on case   #overall prognosis guarded

## 2018-06-11 NOTE — PROGRESS NOTE ADULT - SUBJECTIVE AND OBJECTIVE BOX
Nephrology progress note    Patient is seen and examined, events over the last 24 h noted .    Allergies:  No Known Allergies    Hospital Medications:   MEDICATIONS  (STANDING):  cefTRIAXone   IVPB 2 Gram(s) IV Intermittent every 24 hours  darunavir 600 milliGRAM(s) Oral every 12 hours  dronabinol 10 milliGRAM(s) Oral two times a day  enoxaparin Injectable 30 milliGRAM(s) SubCutaneous daily  etravirine 200 milliGRAM(s) Oral every 12 hours  folic acid 1 milliGRAM(s) Oral daily  furosemide    Tablet 40 milliGRAM(s) Oral daily  lactulose Syrup 30 Gram(s) Oral every 6 hours  LORazepam   Injectable 0.5 milliGRAM(s) IV Push every 12 hours  LORazepam   Injectable   IV Push   methadone    Tablet 110 milliGRAM(s) Oral daily  multivitamin/minerals 1 Tablet(s) Oral daily  nicotine - 21 mG/24Hr(s) Patch 1 patch Transdermal daily  Parenteral Nutrition - Adult 1 Each (65 mL/Hr) TPN Continuous <Continuous>  raltegravir Tablet 400 milliGRAM(s) Oral every 12 hours  rifaximin 550 milliGRAM(s) Oral two times a day  ritonavir Tablet 100 milliGRAM(s) Oral every 12 hours  spironolactone 100 milliGRAM(s) Oral daily  thiamine 100 milliGRAM(s) Oral daily        VITALS:  T(F): 97.1 (06-11-18 @ 07:04), Max: 98.5 (06-10-18 @ 12:20)  HR: 101 (06-11-18 @ 07:04)  BP: 123/88 (06-11-18 @ 07:04)  RR: 18 (06-11-18 @ 07:04)  SpO2: --  Wt(kg): --    06-10 @ 07:01  -  06-11 @ 07:00  --------------------------------------------------------  IN: 1065 mL / OUT: 800 mL / NET: 265 mL          PHYSICAL EXAM:  Constitutional: NAD  HEENT: anicteric sclera, oropharynx clear, MMM  Neck: No JVD  Respiratory: CTAB, no wheezes, rales or rhonchi  Cardiovascular: S1, S2, RRR  Gastrointestinal: BS+, soft, NT/ND  Extremities: No cyanosis or clubbing. No peripheral edema  :  No rivera.   Skin: No rashes    LABS:  06-10    123<L>  |  82<L>  |  12  ----------------------------<  108<H>  4.2   |  26  |  0.6<L>  SODIUM TREND:  Sodium 123 [06-10 @ 07:44]  Sodium 125 [06-09 @ 08:11]  Sodium 127 [06-08 @ 07:38]  Sodium 125 [06-07 @ 08:48]  Sodium 124 [06-06 @ 21:47]  Sodium 125 [06-06 @ 11:55]  Sodium 123 [06-05 @ 20:43]    Ca    8.8      10 Tim 2018 07:44  Phos  3.3     06-10  Mg     1.7     06-10    TPro  6.7  /  Alb  2.6<L>  /  TBili  0.9  /  DBili  0.6<H>  /  AST  458<H>  /  ALT  238<H>  /  AlkPhos  282<H>  06-10                          12.2   9.70  )-----------( 216      ( 10 Tim 2018 07:44 )             37.9       Urine Studies:    Osmolality, Random Urine: 433 mos/kg (06-07 @ 01:59)  Sodium, Random Urine: 20.0 mmoL/L (06-07 @ 01:58)  Creatinine, Random Urine: 151 mg/dL (06-07 @ 01:58)  Chloride, Random Urine: 23 (06-07 @ 01:58)    RADIOLOGY & ADDITIONAL STUDIES: Nephrology progress note    Patient is seen and examined, events over the last 24 h noted .  sitting in a chair lethargic   No new events     Allergies:  No Known Allergies    Hospital Medications:   MEDICATIONS  (STANDING):  cefTRIAXone   IVPB 2 Gram(s) IV Intermittent every 24 hours  darunavir 600 milliGRAM(s) Oral every 12 hours  dronabinol 10 milliGRAM(s) Oral two times a day  enoxaparin Injectable 30 milliGRAM(s) SubCutaneous daily  etravirine 200 milliGRAM(s) Oral every 12 hours  folic acid 1 milliGRAM(s) Oral daily  furosemide    Tablet 40 milliGRAM(s) Oral daily  lactulose Syrup 30 Gram(s) Oral every 6 hours  LORazepam   Injectable 0.5 milliGRAM(s) IV Push every 12 hours  LORazepam   Injectable   IV Push   methadone    Tablet 110 milliGRAM(s) Oral daily  multivitamin/minerals 1 Tablet(s) Oral daily  nicotine - 21 mG/24Hr(s) Patch 1 patch Transdermal daily  Parenteral Nutrition - Adult 1 Each (65 mL/Hr) TPN Continuous <Continuous>  raltegravir Tablet 400 milliGRAM(s) Oral every 12 hours  rifaximin 550 milliGRAM(s) Oral two times a day  ritonavir Tablet 100 milliGRAM(s) Oral every 12 hours  spironolactone 100 milliGRAM(s) Oral daily  thiamine 100 milliGRAM(s) Oral daily        VITALS:  T(F): 97.1 (06-11-18 @ 07:04), Max: 98.5 (06-10-18 @ 12:20)  HR: 101 (06-11-18 @ 07:04)  BP: 123/88 (06-11-18 @ 07:04)  RR: 18 (06-11-18 @ 07:04)  SpO2: --  Wt(kg): --    06-10 @ 07:01  -  06-11 @ 07:00  --------------------------------------------------------  IN: 1065 mL / OUT: 800 mL / NET: 265 mL          PHYSICAL EXAM:  Constitutional: NAD  HEENT: anicteric sclera, oropharynx clear, MMM  Neck: No JVD  Respiratory: CTAB, no wheezes, rales or rhonchi  Cardiovascular: S1, S2, RRR  Gastrointestinal: BS+, soft, NT/ND  Extremities: No cyanosis or clubbing. No peripheral edema  :  No rivera.   Skin: No rashes    LABS:    06-10    123<L>  |  82<L>  |  12  ----------------------------<  108<H>  4.2   |  26  |  0.6<L>  SODIUM TREND:  Sodium 123 [06-10 @ 07:44]  Sodium 125 [06-09 @ 08:11]  Sodium 127 [06-08 @ 07:38]  Sodium 125 [06-07 @ 08:48]  Sodium 124 [06-06 @ 21:47]  Sodium 125 [06-06 @ 11:55]  Sodium 123 [06-05 @ 20:43]    Ca    8.8      10 Tim 2018 07:44  Phos  3.3     06-10  Mg     1.7     06-10    TPro  6.7  /  Alb  2.6<L>  /  TBili  0.9  /  DBili  0.6<H>  /  AST  458<H>  /  ALT  238<H>  /  AlkPhos  282<H>  06-10                          12.2   9.70  )-----------( 216      ( 10 Tim 2018 07:44 )             37.9       Urine Studies:    Osmolality, Random Urine: 433 mos/kg (06-07 @ 01:59)  Sodium, Random Urine: 20.0 mmoL/L (06-07 @ 01:58)  Creatinine, Random Urine: 151 mg/dL (06-07 @ 01:58)  Chloride, Random Urine: 23 (06-07 @ 01:58)    RADIOLOGY & ADDITIONAL STUDIES: Nephrology progress note    Patient is seen and examined, events over the last 24 h noted .  sitting in a chair lethargic   No new events     Allergies:  No Known Allergies    Hospital Medications:   MEDICATIONS  (STANDING):  cefTRIAXone   IVPB 2 Gram(s) IV Intermittent every 24 hours  darunavir 600 milliGRAM(s) Oral every 12 hours  dronabinol 10 milliGRAM(s) Oral two times a day  enoxaparin Injectable 30 milliGRAM(s) SubCutaneous daily  etravirine 200 milliGRAM(s) Oral every 12 hours  folic acid 1 milliGRAM(s) Oral daily  furosemide    Tablet 40 milliGRAM(s) Oral daily  lactulose Syrup 30 Gram(s) Oral every 6 hours  LORazepam   Injectable 0.5 milliGRAM(s) IV Push every 12 hours  LORazepam   Injectable   IV Push   methadone    Tablet 110 milliGRAM(s) Oral daily  multivitamin/minerals 1 Tablet(s) Oral daily  nicotine - 21 mG/24Hr(s) Patch 1 patch Transdermal daily  Parenteral Nutrition - Adult 1 Each (65 mL/Hr) TPN Continuous <Continuous>  raltegravir Tablet 400 milliGRAM(s) Oral every 12 hours  rifaximin 550 milliGRAM(s) Oral two times a day  ritonavir Tablet 100 milliGRAM(s) Oral every 12 hours  spironolactone 100 milliGRAM(s) Oral daily  thiamine 100 milliGRAM(s) Oral daily        VITALS:  T(F): 97.1 (06-11-18 @ 07:04), Max: 98.5 (06-10-18 @ 12:20)  HR: 101 (06-11-18 @ 07:04)  BP: 123/88 (06-11-18 @ 07:04)  RR: 18 (06-11-18 @ 07:04)  SpO2: --  Wt(kg): --    06-10 @ 07:01  -  06-11 @ 07:00  --------------------------------------------------------  IN: 1065 mL / OUT: 800 mL / NET: 265 mL          PHYSICAL EXAM:  Constitutional: NAD  HEENT: anicteric sclera, oropharynx clear, MMM  Neck: No JVD  Respiratory: CTAB, no wheezes, rales or rhonchi  Cardiovascular: S1, S2, RRR  Gastrointestinal: distended abdomen / tense   Extremities: No cyanosis or clubbing. No peripheral edema  :  No rivera.   Skin: No rashes    LABS:    06-10    123<L>  |  82<L>  |  12  ----------------------------<  108<H>  4.2   |  26  |  0.6<L>  SODIUM TREND:  Sodium 123 [06-10 @ 07:44]  Sodium 125 [06-09 @ 08:11]  Sodium 127 [06-08 @ 07:38]  Sodium 125 [06-07 @ 08:48]  Sodium 124 [06-06 @ 21:47]  Sodium 125 [06-06 @ 11:55]  Sodium 123 [06-05 @ 20:43]    Ca    8.8      10 Tim 2018 07:44  Phos  3.3     06-10  Mg     1.7     06-10    TPro  6.7  /  Alb  2.6<L>  /  TBili  0.9  /  DBili  0.6<H>  /  AST  458<H>  /  ALT  238<H>  /  AlkPhos  282<H>  06-10                          12.2   9.70  )-----------( 216      ( 10 Itm 2018 07:44 )             37.9       Urine Studies:    Osmolality, Random Urine: 433 mos/kg (06-07 @ 01:59)  Sodium, Random Urine: 20.0 mmoL/L (06-07 @ 01:58)  Creatinine, Random Urine: 151 mg/dL (06-07 @ 01:58)  Chloride, Random Urine: 23 (06-07 @ 01:58)    RADIOLOGY & ADDITIONAL STUDIES:

## 2018-06-11 NOTE — PROGRESS NOTE ADULT - SUBJECTIVE AND OBJECTIVE BOX
Patient is a 47y old  Male who presents with a chief complaint of hyponatremia (06 Jun 2018 03:34)    Pt sent in Dr Reilly from his office for therapeutic paracentesis.  pt has been having progressive abdominal distension with abdominal pain for about a months, and for the past few days he has been having difficulty ambulating and SOB on exertion. He also c/o nausea/vomiting, on/off fever, abdominal pain since being diagnosed with cancer but worse for last month. In ED, 3 L of ascitic fluid was removed. pt is npo on ppn now.   LABS  06-10    123<L>  |  82<L>  |  12  ----------------------------<  108<H>  4.2   |  26  |  0.6<L>    Ca    8.8      10 Tim 2018 07:44  Phos  3.3     06-10  Mg     1.7     06-10    TPro  6.7  /  Alb  2.6<L>  /  TBili  0.9  /  DBili  0.6<H>  /  AST  458<H>  /  ALT  238<H>  /  AlkPhos  282<H>  06-10                          12.2   9.70  )-----------( 216      ( 10 Tim 2018 07:44 )             37.9         Allergies    No Known Allergies:NKDA  Drug Dosing Weight  Height (cm): 172.72 (06 Jun 2018 04:44)  Weight (kg): 71 (06 Jun 2018 04:44)  BMI (kg/m2): 23.8 (06 Jun 2018 04:44)  BSA (m2): 1.84 (06 Jun 2018 04:44)  T(C): 35.6 (06-11-18 @ 12:12), Max: 36.2 (06-11-18 @ 07:04)  HR: 100 (06-11-18 @ 12:12) (100 - 101)  BP: 137/88 (06-11-18 @ 12:12) (121/79 - 137/88)  RR: 22 (06-11-18 @ 12:12) (18 - 22)        06-10-18 @ 07:01  -  06-11-18 @ 07:00  --------------------------------------------------------  IN: 1065 mL / OUT: 800 mL / NET: 265 mL        cefTRIAXone   IVPB 2 Gram(s) IV Intermittent every 24 hours  darunavir 600 milliGRAM(s) Oral every 12 hours  dronabinol 10 milliGRAM(s) Oral two times a day  enoxaparin Injectable 30 milliGRAM(s) SubCutaneous daily  etravirine 200 milliGRAM(s) Oral every 12 hours  fat emulsion (Plant Based) 20% Infusion 1.4 Gm/kG/Day IV Continuous <Continuous>  folic acid 1 milliGRAM(s) Oral daily  furosemide    Tablet 40 milliGRAM(s) Oral daily  ibuprofen  Tablet 400 milliGRAM(s) Oral three times a day PRN  lactulose Syrup 30 Gram(s) Oral every 6 hours  LORazepam   Injectable 0.5 milliGRAM(s) IV Push every 12 hours  LORazepam   Injectable   IV Push   LORazepam   Injectable 2 milliGRAM(s) IV Push every 2 hours PRN  methadone    Tablet 110 milliGRAM(s) Oral daily  morphine  - Injectable 2 milliGRAM(s) IV Push once  morphine  - Injectable 6 milliGRAM(s) IV Push every 4 hours  multivitamin/minerals 1 Tablet(s) Oral daily  nicotine - 21 mG/24Hr(s) Patch 1 patch Transdermal daily  Parenteral Nutrition - Adult 1 Each TPN Continuous <Continuous>  Parenteral Nutrition - Adult 1 Each TPN Continuous <Continuous>  raltegravir Tablet 400 milliGRAM(s) Oral every 12 hours  rifaximin 550 milliGRAM(s) Oral two times a day  ritonavir Tablet 100 milliGRAM(s) Oral every 12 hours  spironolactone 100 milliGRAM(s) Oral daily  thiamine 100 milliGRAM(s) Oral daily  traMADol 50 milliGRAM(s) Oral two times a day PRN

## 2018-06-12 LAB
ALBUMIN SERPL ELPH-MCNC: 2.2 G/DL — LOW (ref 3.5–5.2)
ALP SERPL-CCNC: 273 U/L — HIGH (ref 30–115)
ALT FLD-CCNC: 142 U/L — HIGH (ref 0–41)
ANION GAP SERPL CALC-SCNC: 12 MMOL/L — SIGNIFICANT CHANGE UP (ref 7–14)
APTT BLD: 28.7 SEC — SIGNIFICANT CHANGE UP (ref 27–39.2)
AST SERPL-CCNC: 217 U/L — HIGH (ref 0–41)
BILIRUB DIRECT SERPL-MCNC: 0.5 MG/DL — HIGH (ref 0–0.2)
BILIRUB INDIRECT FLD-MCNC: 0.2 MG/DL — SIGNIFICANT CHANGE UP (ref 0.2–1.2)
BILIRUB SERPL-MCNC: 0.7 MG/DL — SIGNIFICANT CHANGE UP (ref 0.2–1.2)
BUN SERPL-MCNC: 13 MG/DL — SIGNIFICANT CHANGE UP (ref 10–20)
CALCIUM SERPL-MCNC: 8.2 MG/DL — LOW (ref 8.5–10.1)
CHLORIDE SERPL-SCNC: 86 MMOL/L — LOW (ref 98–110)
CO2 SERPL-SCNC: 23 MMOL/L — SIGNIFICANT CHANGE UP (ref 17–32)
CREAT SERPL-MCNC: 0.5 MG/DL — LOW (ref 0.7–1.5)
CULTURE RESULTS: SIGNIFICANT CHANGE UP
GLUCOSE SERPL-MCNC: 103 MG/DL — HIGH (ref 70–99)
HCT VFR BLD CALC: 36.6 % — LOW (ref 42–52)
HGB BLD-MCNC: 12 G/DL — LOW (ref 14–18)
INR BLD: 1.39 RATIO — HIGH (ref 0.65–1.3)
MAGNESIUM SERPL-MCNC: 1.6 MG/DL — LOW (ref 1.8–2.4)
MCHC RBC-ENTMCNC: 27.4 PG — SIGNIFICANT CHANGE UP (ref 27–31)
MCHC RBC-ENTMCNC: 32.8 G/DL — SIGNIFICANT CHANGE UP (ref 32–37)
MCV RBC AUTO: 83.6 FL — SIGNIFICANT CHANGE UP (ref 80–94)
NRBC # BLD: 0 /100 WBCS — SIGNIFICANT CHANGE UP (ref 0–0)
PHOSPHATE SERPL-MCNC: 2.9 MG/DL — SIGNIFICANT CHANGE UP (ref 2.1–4.9)
PLATELET # BLD AUTO: 199 K/UL — SIGNIFICANT CHANGE UP (ref 130–400)
POTASSIUM SERPL-MCNC: 4.3 MMOL/L — SIGNIFICANT CHANGE UP (ref 3.5–5)
POTASSIUM SERPL-SCNC: 4.3 MMOL/L — SIGNIFICANT CHANGE UP (ref 3.5–5)
PROT SERPL-MCNC: 6.1 G/DL — SIGNIFICANT CHANGE UP (ref 6–8)
PROTHROM AB SERPL-ACNC: 15.1 SEC — HIGH (ref 9.95–12.87)
RBC # BLD: 4.38 M/UL — LOW (ref 4.7–6.1)
RBC # FLD: 15.6 % — HIGH (ref 11.5–14.5)
SODIUM SERPL-SCNC: 121 MMOL/L — LOW (ref 135–146)
SPECIMEN SOURCE: SIGNIFICANT CHANGE UP
WBC # BLD: 10.79 K/UL — SIGNIFICANT CHANGE UP (ref 4.8–10.8)
WBC # FLD AUTO: 10.79 K/UL — SIGNIFICANT CHANGE UP (ref 4.8–10.8)

## 2018-06-12 RX ORDER — IBUPROFEN 200 MG
400 TABLET ORAL ONCE
Qty: 0 | Refills: 0 | Status: COMPLETED | OUTPATIENT
Start: 2018-06-12 | End: 2018-06-12

## 2018-06-12 RX ORDER — MORPHINE SULFATE 50 MG/1
6 CAPSULE, EXTENDED RELEASE ORAL
Qty: 0 | Refills: 0 | Status: DISCONTINUED | OUTPATIENT
Start: 2018-06-12 | End: 2018-06-13

## 2018-06-12 RX ADMIN — Medication 400 MILLIGRAM(S): at 04:44

## 2018-06-12 RX ADMIN — MORPHINE SULFATE 6 MILLIGRAM(S): 50 CAPSULE, EXTENDED RELEASE ORAL at 07:25

## 2018-06-12 RX ADMIN — SPIRONOLACTONE 100 MILLIGRAM(S): 25 TABLET, FILM COATED ORAL at 05:03

## 2018-06-12 RX ADMIN — MORPHINE SULFATE 6 MILLIGRAM(S): 50 CAPSULE, EXTENDED RELEASE ORAL at 18:41

## 2018-06-12 RX ADMIN — MORPHINE SULFATE 6 MILLIGRAM(S): 50 CAPSULE, EXTENDED RELEASE ORAL at 10:42

## 2018-06-12 RX ADMIN — MORPHINE SULFATE 6 MILLIGRAM(S): 50 CAPSULE, EXTENDED RELEASE ORAL at 22:00

## 2018-06-12 RX ADMIN — Medication 2 MILLIGRAM(S): at 05:00

## 2018-06-12 RX ADMIN — MORPHINE SULFATE 6 MILLIGRAM(S): 50 CAPSULE, EXTENDED RELEASE ORAL at 13:34

## 2018-06-12 RX ADMIN — MORPHINE SULFATE 6 MILLIGRAM(S): 50 CAPSULE, EXTENDED RELEASE ORAL at 21:25

## 2018-06-12 RX ADMIN — MORPHINE SULFATE 6 MILLIGRAM(S): 50 CAPSULE, EXTENDED RELEASE ORAL at 01:01

## 2018-06-12 RX ADMIN — Medication 40 MILLIGRAM(S): at 05:03

## 2018-06-12 RX ADMIN — Medication 2 MILLIGRAM(S): at 02:05

## 2018-06-12 RX ADMIN — Medication 2 MILLIGRAM(S): at 14:21

## 2018-06-12 RX ADMIN — Medication 1 PATCH: at 11:31

## 2018-06-12 RX ADMIN — LACTULOSE 30 GRAM(S): 10 SOLUTION ORAL at 05:01

## 2018-06-12 RX ADMIN — MORPHINE SULFATE 6 MILLIGRAM(S): 50 CAPSULE, EXTENDED RELEASE ORAL at 16:57

## 2018-06-12 RX ADMIN — Medication 2 MILLIGRAM(S): at 04:44

## 2018-06-12 RX ADMIN — Medication 2 MILLIGRAM(S): at 16:33

## 2018-06-12 RX ADMIN — MORPHINE SULFATE 6 MILLIGRAM(S): 50 CAPSULE, EXTENDED RELEASE ORAL at 18:46

## 2018-06-12 RX ADMIN — CEFTRIAXONE 100 GRAM(S): 500 INJECTION, POWDER, FOR SOLUTION INTRAMUSCULAR; INTRAVENOUS at 11:33

## 2018-06-12 RX ADMIN — Medication 400 MILLIGRAM(S): at 07:23

## 2018-06-12 RX ADMIN — MORPHINE SULFATE 6 MILLIGRAM(S): 50 CAPSULE, EXTENDED RELEASE ORAL at 05:02

## 2018-06-12 RX ADMIN — Medication 2 MILLIGRAM(S): at 19:21

## 2018-06-12 RX ADMIN — MORPHINE SULFATE 6 MILLIGRAM(S): 50 CAPSULE, EXTENDED RELEASE ORAL at 18:44

## 2018-06-12 RX ADMIN — MORPHINE SULFATE 6 MILLIGRAM(S): 50 CAPSULE, EXTENDED RELEASE ORAL at 11:32

## 2018-06-12 RX ADMIN — METHADONE HYDROCHLORIDE 110 MILLIGRAM(S): 40 TABLET ORAL at 17:16

## 2018-06-12 RX ADMIN — Medication 1 PATCH: at 11:33

## 2018-06-12 NOTE — PROGRESS NOTE ADULT - ASSESSMENT
IMPRESSION:  SBP, cirrhosis, hepatoma  On Lactulose   S/P paracentesis    RECOMMENDATIONS:  Continue with Lactulose and Rifaximin/ Rocephin.  Needs pain management ot see pt.  Hospice evaluation.

## 2018-06-12 NOTE — CONSULT NOTE ADULT - SUBJECTIVE AND OBJECTIVE BOX
REQUESTED OF: DR Sheppard    CLINICAL ISSUE TO BE EVALUATED BY CONSULTANT: Pain/goals of care        JACINTO GAMEZ 47yMale  HPI:  46 yo M w/ h/o HIV on HAART therapy, liver cirrhosis/liver carcinoma, Hepatitis C infection s/p treatment, alcohol abuse, withdrawal seizures in past, h/o heroin abuse-on methadone program since 2 weeks now  sent in Dr Reilly from his office for therapeutic paracentesis.   (06 Jun 2018 03:34) Pt w appointment to see Pain MD (Dr. Alicea) as outpatient, however never made it to the appointment given acute decompensation. Hospital course c/b encephalopathy, poor po intake w refractory pain. s/p paracentesis yesterday.     Patient seen, lethargic/sedated/confused. Received morphine 6mg IV. Pain appears well controlled at this time. No N/V/dyspnea.  +TPN    Wife at bedside.     PAST MEDICAL & SURGICAL HISTORY:  PPD+ (purified protein derivative positive)  Hepatitis-C: s/p treatment  Nicotine dependence  HIV (human immunodeficiency virus infection): on HAART  EtOH dependence  H/O resection of liver: partial        PHYSICAL EXAM:  Adult man, appears chronically/critically ill  PERRL  RRR  Abdom soft, ++ascites  No edema        T(C): 35.4, Max: 36.2 (21:14)  HR: 107 (99 - 107)  BP: 123/80 (123/80 - 140/85)  RR: 20 (20 - 24)  SpO2: --      LABS/STUDIES:  06-12    121<L>  |  86<L>  |  13  ----------------------------<  103<H>  4.3   |  23  |  0.5<L>    Ca    8.2<L>      12 Jun 2018 07:30  Phos  2.9     06-12  Mg     1.6     06-12    TPro  6.1  /  Alb  2.2<L>  /  TBili  0.7  /  DBili  0.5<H>  /  AST  217<H>  /  ALT  142<H>  /  AlkPhos  273<H>  06-12                            12.0   10.79 )-----------( 199      ( 12 Jun 2018 07:30 )             36.6       MEDICATIONS  (STANDING):  darunavir 600 milliGRAM(s) Oral every 12 hours  dronabinol 10 milliGRAM(s) Oral two times a day  enoxaparin Injectable 30 milliGRAM(s) SubCutaneous daily  etravirine 200 milliGRAM(s) Oral every 12 hours  folic acid 1 milliGRAM(s) Oral daily  furosemide    Tablet 40 milliGRAM(s) Oral daily  methadone    Tablet 110 milliGRAM(s) Oral daily  morphine  - Injectable 6 milliGRAM(s) IV Push every 4 hours  multivitamin/minerals 1 Tablet(s) Oral daily  nicotine - 21 mG/24Hr(s) Patch 1 patch Transdermal daily  Parenteral Nutrition - Adult 1 Each (65 mL/Hr) TPN Continuous <Continuous>  raltegravir Tablet 400 milliGRAM(s) Oral every 12 hours  rifaximin 550 milliGRAM(s) Oral two times a day  ritonavir Tablet 100 milliGRAM(s) Oral every 12 hours  spironolactone 100 milliGRAM(s) Oral daily  thiamine 100 milliGRAM(s) Oral daily    MEDICATIONS  (PRN):  ibuprofen  Tablet 400 milliGRAM(s) Oral three times a day PRN Temp > 100.4  LORazepam   Injectable 2 milliGRAM(s) IV Push every 2 hours PRN CIWA-Ar score increase by 2 points and a total score of 7 or less  traMADol 50 milliGRAM(s) Oral two times a day PRN Severe Pain (7 - 10)        [place] Symptom Assesment Scale      PPS (Palliative Performance Scale): 10

## 2018-06-12 NOTE — PROGRESS NOTE ADULT - SUBJECTIVE AND OBJECTIVE BOX
Patient is a 47y old  Male who presents with a chief complaint of hyponatremia (06 Jun 2018 03:34)  pt seen and evaluated . hospice consult noted  at this time decision made to pursue comfort measure  comfort feeds if pt awake  pt is on ppn / order written to d/c ppn when bag ends today    LABS  06-12    121<L>  |  86<L>  |  13  ----------------------------<  103<H>  4.3   |  23  |  0.5<L>    Ca    8.2<L>      12 Jun 2018 07:30  Phos  2.9     06-12  Mg     1.6     06-12    TPro  6.1  /  Alb  2.2<L>  /  TBili  0.7  /  DBili  0.5<H>  /  AST  217<H>  /  ALT  142<H>  /  AlkPhos  273<H>  06-12                          12.0   10.79 )-----------( 199      ( 12 Jun 2018 07:30 )             36.6         Allergies:nkda    Drug Dosing Weight  Height (cm): 172.72 (06 Jun 2018 04:44)  Weight (kg): 71 (06 Jun 2018 04:44)  BMI (kg/m2): 23.8 (06 Jun 2018 04:44)  BSA (m2): 1.84 (06 Jun 2018 04:44)  T(C): 35.4 (06-12-18 @ 14:26), Max: 36.2 (06-11-18 @ 21:14)  HR: 107 (06-12-18 @ 14:26) (99 - 107)  BP: 123/80 (06-12-18 @ 14:26) (123/80 - 140/85)  RR: 20 (06-12-18 @ 14:26) (20 - 24)  SpO2: --        darunavir 600 milliGRAM(s) Oral every 12 hours  dronabinol 10 milliGRAM(s) Oral two times a day  etravirine 200 milliGRAM(s) Oral every 12 hours  furosemide    Tablet 40 milliGRAM(s) Oral daily  LORazepam   Injectable 2 milliGRAM(s) IV Push every 2 hours PRN  methadone    Tablet 110 milliGRAM(s) Oral daily  morphine  - Injectable 6 milliGRAM(s) IV Push every 4 hours  nicotine - 21 mG/24Hr(s) Patch 1 patch Transdermal daily  Parenteral Nutrition - Adult 1 Each TPN Continuous <Continuous>  raltegravir Tablet 400 milliGRAM(s) Oral every 12 hours  ritonavir Tablet 100 milliGRAM(s) Oral every 12 hours  spironolactone 100 milliGRAM(s) Oral daily

## 2018-06-12 NOTE — CONSULT NOTE ADULT - ASSESSMENT
47yMale being evaluated for pain/goals of care.     Met w patient's wife; clinical condition reviewed in detail, wife reports pt free of IVD since the initiation of methadone maintenance.   Family aware of patient's grave condition, at this time decision made to pursue comfort measures only with consideration for Hospice care if symptoms controlled and bp stable.  No further blood draws, no fingersticks , no artificial hydration/nutrition  All questions answered in detail.  Case d/w ID (Dr. Swift)    Recommendations:  Transfer to  bed  Initiate CMO as above - dc ABx, taper off TPN  Hospice to f/u  cont morphine 6mg IV q3g PRN pain- will cont to titrate as needed  ativan 2mg IV q2h PRN agitation  cont methadone maintenance as written (if pt can tolerate po)  comfort feeds if pt awake    We will follow  Call [phone] PRN

## 2018-06-12 NOTE — PROGRESS NOTE ADULT - ASSESSMENT
ASSESSMENT/PLAN  HIV on HAART  - hep C  - HCC with peritoneal mets  - decompensated cirrhosis  - active EtOH and drug use  - hepatic encephalopathy  Hospice consult noted  order to d/c ppn done

## 2018-06-12 NOTE — PROGRESS NOTE ADULT - SUBJECTIVE AND OBJECTIVE BOX
SUBJECTIVE:    Patient is a 47y old Male who presents with a chief complaint of hyponatremia (06 Jun 2018 03:34)    Currently admitted to medicine with the primary diagnosis of Hyponatremia     Today is hospital day 6d. This morning he is resting comfortably in bed and reports no new issues or overnight events.     PAST MEDICAL & SURGICAL HISTORY  PPD+ (purified protein derivative positive)  Hepatitis-C: s/p treatment  Nicotine dependence  HIV (human immunodeficiency virus infection): on HAART  EtOH dependence  H/O resection of liver: partial    SOCIAL HISTORY:  Negative for smoking/alcohol/drug use.     ALLERGIES:  No Known Allergies    MEDICATIONS:  STANDING MEDICATIONS  cefTRIAXone   IVPB 2 Gram(s) IV Intermittent every 24 hours  darunavir 600 milliGRAM(s) Oral every 12 hours  dronabinol 10 milliGRAM(s) Oral two times a day  enoxaparin Injectable 30 milliGRAM(s) SubCutaneous daily  etravirine 200 milliGRAM(s) Oral every 12 hours  fat emulsion (Plant Based) 20% Infusion 1.4 Gm/kG/Day IV Continuous <Continuous>  folic acid 1 milliGRAM(s) Oral daily  furosemide    Tablet 40 milliGRAM(s) Oral daily  lactulose Syrup 30 Gram(s) Oral every 6 hours  methadone    Tablet 110 milliGRAM(s) Oral daily  morphine  - Injectable 6 milliGRAM(s) IV Push every 4 hours  multivitamin/minerals 1 Tablet(s) Oral daily  nicotine - 21 mG/24Hr(s) Patch 1 patch Transdermal daily  Parenteral Nutrition - Adult 1 Each TPN Continuous <Continuous>  raltegravir Tablet 400 milliGRAM(s) Oral every 12 hours  rifaximin 550 milliGRAM(s) Oral two times a day  ritonavir Tablet 100 milliGRAM(s) Oral every 12 hours  spironolactone 100 milliGRAM(s) Oral daily  thiamine 100 milliGRAM(s) Oral daily    PRN MEDICATIONS  ibuprofen  Tablet 400 milliGRAM(s) Oral three times a day PRN  LORazepam   Injectable 2 milliGRAM(s) IV Push every 2 hours PRN  traMADol 50 milliGRAM(s) Oral two times a day PRN    VITALS:   T(F): 96.8  HR: 99  BP: 126/78  RR: 22  SpO2: --    LABS:                        12.0   10.79 )-----------( 199      ( 12 Jun 2018 07:30 )             36.6     06-12    121<L>  |  86<L>  |  13  ----------------------------<  103<H>  4.3   |  23  |  0.5<L>    Ca    8.2<L>      12 Jun 2018 07:30  Phos  2.9     06-12  Mg     1.6     06-12    TPro  6.1  /  Alb  2.2<L>  /  TBili  0.7  /  DBili  0.5<H>  /  AST  217<H>  /  ALT  142<H>  /  AlkPhos  273<H>  06-12    PT/INR - ( 12 Jun 2018 07:30 )   PT: 15.10 sec;   INR: 1.39 ratio         PTT - ( 12 Jun 2018 07:30 )  PTT:28.7 sec      PHYSICAL EXAM:  GEN: No acute distress  LUNGS: Clear to auscultation bilaterally   HEART: S1/S2 present. RRR.   ABD: + distended, firm  EXT: NC/NC/NE/2+PP/RUTH  NEURO: somnolent but arousable

## 2018-06-12 NOTE — GOALS OF CARE CONVERSATION - PERSONAL ADVANCE DIRECTIVE - CONVERSATION DETAILS
Patient is a 46 y/o male currently admitted to medicine with the primary diagnosis of Hyponatremia.  Patient has hx of PPD+ (purified protein derivative positive), Hepatitis-C: s/p treatment, Nicotine dependence, HIV (human immunodeficiency virus infection): on HAART, EtOH dependence, H/O resection of liver: partial.    Palliative care team met with patient's spouse/surrogate Yanni to discuss goals of care.  Discussed current diagnosis and prognosis, as well as patient's medical history.  Patient's spouse verbalized understanding.  Patient's spouse related she met with Hospice yesterday, however, was not fully able to make a decision at that time.  Spouse Yanni related she is now able to make decisions regarding patient's care and is opting for DNR/DNI as well as comfort care at this time. Spouse also requested hospice revisit with her regarding safe discharge plan.     Plan for patient to be transferred to advanced illness unit.  DNR/DNI, no further blood work and/or fingersticks. No further antibiotics. No IV nutrition or hydration.

## 2018-06-12 NOTE — PROGRESS NOTE ADULT - SUBJECTIVE AND OBJECTIVE BOX
infectious diseases progress note:  JACINTO GAMEZ is a 47yMale patient    HYPONATREMIA;ASCITES        ROS:  CONSTITUTIONAL:  Negative fever or chills, feels well, good appetite  EYES:  Negative  blurry vision or double vision  CARDIOVASCULAR:  Negative for chest pain or palpitations  RESPIRATORY:  Negative for cough, wheezing, or SOB   GASTROINTESTINAL:  Negative for nausea, vomiting, diarrhea, constipation, or abdominal pain  GENITOURINARY:  Negative frequency, urgency or dysuria  NEUROLOGIC:  No headache, confusion, dizziness, lightheadedness    Allergies    No Known Allergies    Intolerances        ANTIBIOTICS/RELEVANT:  antimicrobials  cefTRIAXone   IVPB 2 Gram(s) IV Intermittent every 24 hours  darunavir 600 milliGRAM(s) Oral every 12 hours  etravirine 200 milliGRAM(s) Oral every 12 hours  raltegravir Tablet 400 milliGRAM(s) Oral every 12 hours  rifaximin 550 milliGRAM(s) Oral two times a day  ritonavir Tablet 100 milliGRAM(s) Oral every 12 hours    immunologic:    OTHER:  dronabinol 10 milliGRAM(s) Oral two times a day  enoxaparin Injectable 30 milliGRAM(s) SubCutaneous daily  fat emulsion (Plant Based) 20% Infusion 1.4 Gm/kG/Day IV Continuous <Continuous>  folic acid 1 milliGRAM(s) Oral daily  furosemide    Tablet 40 milliGRAM(s) Oral daily  ibuprofen  Tablet 400 milliGRAM(s) Oral three times a day PRN  lactulose Syrup 30 Gram(s) Oral every 6 hours  LORazepam   Injectable 0.5 milliGRAM(s) IV Push every 12 hours  LORazepam   Injectable   IV Push   LORazepam   Injectable 2 milliGRAM(s) IV Push every 2 hours PRN  methadone    Tablet 110 milliGRAM(s) Oral daily  morphine  - Injectable 6 milliGRAM(s) IV Push every 4 hours  multivitamin/minerals 1 Tablet(s) Oral daily  nicotine - 21 mG/24Hr(s) Patch 1 patch Transdermal daily  Parenteral Nutrition - Adult 1 Each TPN Continuous <Continuous>  spironolactone 100 milliGRAM(s) Oral daily  thiamine 100 milliGRAM(s) Oral daily  traMADol 50 milliGRAM(s) Oral two times a day PRN      Objective:  T(F): 96.8 (06-12-18 @ 04:12), Max: 97.1 (06-11-18 @ 21:14)  HR: 99 (06-12-18 @ 04:12) (99 - 104)  BP: 126/78 (06-12-18 @ 04:12) (126/78 - 140/85)  RR: 22 (06-12-18 @ 04:12) (22 - 24)  SpO2: --    PHYSICAL EXAM  Constitutional:Well-developed, well nourished  Eyes:ALONSO, EOMI  Ear/Nose/Throat: no oral lesion, no sinus tenderness on percussion	  Neck:no JVD, no lymphadenopathy, supple  Respiratory: CTA clarissa  Cardiovascular: S1S2 RRR, no murmurs  Gastrointestinal:soft, (+) BS, no HSM  Extremities:no e/e/c    06-11    121<L>  |  83<L>  |  14  ----------------------------<  84  4.2   |  23  |  0.5<L>    Mg     1.5     06-11    TPro  6.7  /  Alb  2.6<L>  /  TBili  0.9  /  DBili  0.6<H>  /  AST  458<H>  /  ALT  238<H>  /  AlkPhos  282<H>  06-10                            11.5   11.26 )-----------( 206      ( 11 Jun 2018 17:40 )             35.1       PT/INR - ( 11 Jun 2018 17:40 )   PT: 15.30 sec;   INR: 1.41 ratio             Culture - Blood (collected 08 Jun 2018 07:38)  Source: .Blood None  Preliminary Report (09 Jun 2018 16:01):    No growth to date.    Culture - Blood (collected 07 Jun 2018 18:54)  Source: .Blood None  Preliminary Report (09 Jun 2018 01:02):    No growth to date.    Culture - Fungal, Body Fluid (collected 07 Jun 2018 08:01)  Source: .Body Fluid Peritoneal Fluid  Preliminary Report (11 Jun 2018 09:19):    Testing in progress    Culture - Acid Fast - Body Fluid w/Smear (collected 07 Jun 2018 08:01)  Source: .Body Fluid Peritoneal Fluid    Culture - Body Fluid with Gram Stain (collected 07 Jun 2018 08:01)  Source: .Body Fluid Abdominal Fluid  Gram Stain (08 Jun 2018 02:41):    polymorphonuclear leukocytes seen per low power field    No organisms seen per oil power field    by cytocentrifuge  Preliminary Report (08 Jun 2018 18:29):    No growth

## 2018-06-12 NOTE — PROGRESS NOTE ADULT - ASSESSMENT
48 yo M w/ h/o HIV on HAART therapy, liver cirrhosis/liver carcinoma, Hepatitis C infection s/p treatment, alcohol abuse, withdrawal seizures in past, h/o heroin abuse on methadone program sent in Dr Reilly from his office for therapeutic paracentesis. pt has been having progressive abdominal distension with abdominal pain for about a month. For the past few days he has been having difficulty ambulating and SOB on exertion so Dr Reilly sent him in for therapeutic para. In ED, 3 L of ascitic fluid was removed and pt was supposed to be d/luis from ED but due to low sodium on BMP, he was admitted to medicine. His problems are being managed as follows --     #SBP (PMN count > 900 in ascitic fluid) in setting of liver CA, hep C/cirrhosis  - s/p therapeutic paracentesis in ED, 3 L was removed, no tests were sent ; and therapeutic/diagnostic paracentesis w/ IR 6/7 (3.8 L) and 6/11 (3.5 L removed)  - f/u ascitic fluid final cx and path  - c/w rocephin  - ID following appreciate recs  - GI c/s, appreciate recs    #AMS, 2/2 hepatic encephalopathy vs polypharmacy (on ativan ciwa protocol and methadone)  - c/w lactulose  - c/w rifaximin    #Chronic moderate hyponatremia likely 2/2 cirrhosis vs HIV related SIADH, stable  - fluid restriction to less than 1 L per day  - nephrology c/s appreciate recs  - c/w lasix and aldactone   - increase salt via PPN     #HIV, last CD4 count 152 from 05/18 and HIV RNA undetectable  - c/w HAART therapy    #Metastatic hepatocellular ca s/p L lobe resection  - pt is being considered for therapy by Dr Reilly, per heme/onc fellow likely not a candidate for therapy   - OP f/u with hem/onc  - pain management c/s, recalled again today  - hospice eval on d/c planning    #Decreased PO intake   - c/w PPN , Dr. Hinojosa following    #Alcohol abuse  - folate, multivitamin, thiamine  - ativan prn agitation    #h/o heroin abuse on methadone   - c/w methadone 110 mg, dose verified with [facility]    #DVT ppx: lovenox    #Dispo: pending family meeting, likely home w/ hospice, family thinking about withdrawing aggressive medical care

## 2018-06-13 LAB
CULTURE RESULTS: SIGNIFICANT CHANGE UP
CULTURE RESULTS: SIGNIFICANT CHANGE UP
SPECIMEN SOURCE: SIGNIFICANT CHANGE UP
SPECIMEN SOURCE: SIGNIFICANT CHANGE UP

## 2018-06-13 RX ORDER — NICOTINE POLACRILEX 2 MG
1 GUM BUCCAL DAILY
Qty: 0 | Refills: 0 | Status: DISCONTINUED | OUTPATIENT
Start: 2018-06-13 | End: 2018-06-22

## 2018-06-13 RX ORDER — MORPHINE SULFATE 50 MG/1
10 CAPSULE, EXTENDED RELEASE ORAL EVERY 4 HOURS
Qty: 0 | Refills: 0 | Status: DISCONTINUED | OUTPATIENT
Start: 2018-06-13 | End: 2018-06-13

## 2018-06-13 RX ORDER — MORPHINE SULFATE 50 MG/1
3 CAPSULE, EXTENDED RELEASE ORAL
Qty: 0 | Refills: 0 | Status: DISCONTINUED | OUTPATIENT
Start: 2018-06-13 | End: 2018-06-17

## 2018-06-13 RX ORDER — MORPHINE SULFATE 50 MG/1
3 CAPSULE, EXTENDED RELEASE ORAL
Qty: 100 | Refills: 0 | Status: DISCONTINUED | OUTPATIENT
Start: 2018-06-13 | End: 2018-06-13

## 2018-06-13 RX ORDER — MORPHINE SULFATE 50 MG/1
4 CAPSULE, EXTENDED RELEASE ORAL
Qty: 100 | Refills: 0 | Status: DISCONTINUED | OUTPATIENT
Start: 2018-06-13 | End: 2018-06-14

## 2018-06-13 RX ORDER — MORPHINE SULFATE 50 MG/1
10 CAPSULE, EXTENDED RELEASE ORAL
Qty: 0 | Refills: 0 | Status: DISCONTINUED | OUTPATIENT
Start: 2018-06-13 | End: 2018-06-13

## 2018-06-13 RX ADMIN — MORPHINE SULFATE 6 MILLIGRAM(S): 50 CAPSULE, EXTENDED RELEASE ORAL at 07:39

## 2018-06-13 RX ADMIN — MORPHINE SULFATE 6 MILLIGRAM(S): 50 CAPSULE, EXTENDED RELEASE ORAL at 03:30

## 2018-06-13 RX ADMIN — Medication 2 MILLIGRAM(S): at 06:39

## 2018-06-13 RX ADMIN — MORPHINE SULFATE 6 MILLIGRAM(S): 50 CAPSULE, EXTENDED RELEASE ORAL at 03:02

## 2018-06-13 RX ADMIN — Medication 1 PATCH: at 13:55

## 2018-06-13 RX ADMIN — Medication 2 MILLIGRAM(S): at 19:11

## 2018-06-13 RX ADMIN — Medication 2 MILLIGRAM(S): at 21:03

## 2018-06-13 RX ADMIN — Medication 2 MILLIGRAM(S): at 13:48

## 2018-06-13 RX ADMIN — MORPHINE SULFATE 3 MILLIGRAM(S): 50 CAPSULE, EXTENDED RELEASE ORAL at 21:04

## 2018-06-13 RX ADMIN — Medication 2 MILLIGRAM(S): at 10:36

## 2018-06-13 RX ADMIN — MORPHINE SULFATE 6 MILLIGRAM(S): 50 CAPSULE, EXTENDED RELEASE ORAL at 02:30

## 2018-06-13 RX ADMIN — Medication 1 PATCH: at 19:13

## 2018-06-13 RX ADMIN — Medication 2 MILLIGRAM(S): at 22:30

## 2018-06-13 RX ADMIN — MORPHINE SULFATE 10 MILLIGRAM(S): 50 CAPSULE, EXTENDED RELEASE ORAL at 10:37

## 2018-06-13 RX ADMIN — MORPHINE SULFATE 6 MILLIGRAM(S): 50 CAPSULE, EXTENDED RELEASE ORAL at 01:21

## 2018-06-13 RX ADMIN — Medication 2 MILLIGRAM(S): at 02:10

## 2018-06-13 RX ADMIN — MORPHINE SULFATE 6 MILLIGRAM(S): 50 CAPSULE, EXTENDED RELEASE ORAL at 05:46

## 2018-06-13 RX ADMIN — Medication 2 MILLIGRAM(S): at 02:44

## 2018-06-13 RX ADMIN — MORPHINE SULFATE 3 MG/HR: 50 CAPSULE, EXTENDED RELEASE ORAL at 11:53

## 2018-06-13 RX ADMIN — Medication 2 MILLIGRAM(S): at 00:19

## 2018-06-13 RX ADMIN — METHADONE HYDROCHLORIDE 110 MILLIGRAM(S): 40 TABLET ORAL at 13:56

## 2018-06-13 RX ADMIN — MORPHINE SULFATE 3 MILLIGRAM(S): 50 CAPSULE, EXTENDED RELEASE ORAL at 22:29

## 2018-06-13 RX ADMIN — Medication 2 MILLIGRAM(S): at 05:00

## 2018-06-13 RX ADMIN — Medication 1 PATCH: at 19:12

## 2018-06-13 RX ADMIN — MORPHINE SULFATE 6 MILLIGRAM(S): 50 CAPSULE, EXTENDED RELEASE ORAL at 05:00

## 2018-06-13 RX ADMIN — MORPHINE SULFATE 6 MILLIGRAM(S): 50 CAPSULE, EXTENDED RELEASE ORAL at 05:47

## 2018-06-13 RX ADMIN — MORPHINE SULFATE 6 MILLIGRAM(S): 50 CAPSULE, EXTENDED RELEASE ORAL at 01:53

## 2018-06-13 NOTE — PROGRESS NOTE ADULT - SUBJECTIVE AND OBJECTIVE BOX
This is a 46 y/o male with H/O  HIV on HAART therapy, liver cirrhosis/liver carcinoma, Hepatitis C infection s/p treatment, alcohol abuse, withdrawal seizures in past, h/o heroin abuse-on methadone program who was admitted for therapeutic paracentesis. Hospital course c/b encephalopathy, poor po intake w refractory pain. Palliative team net with family yesterday . Family aware of patient's grave condition and decided to pursue comfort measures only with consideration for Hospice care if symptoms controlled and bp stable.   Patient was seen and examined, patient was very uncomfortable and agitated overnight as reported by RN and  spouse who spent the night by the bedside.     PHYSICAL EXAM  GEN: somnolence but arousable, NAD    LUNGS: Clear to auscultation bilaterally   HEART: S1/S2 present. RRR.   ABD: + distended, firm            T(C): , Max: 37.6 (17:03)  T(F): 97.1  HR: 100 (100 - 107)  BP: 130/85 (110/76 - 130/85)  RR: 18 (18 - 20)  SpO2: --                                    12.0   10.79 )-----------( 199      ( 12 Jun 2018 07:30 )             36.6                                                                                      06-12    121<L>  |  86<L>  |  13  ----------------------------<  103<H>  4.3   |  23  |  0.5<L>    Ca    8.2<L>      12 Jun 2018 07:30  Phos  2.9     06-12  Mg     1.6     06-12    TPro  6.1  /  Alb  2.2<L>  /  TBili  0.7  /  DBili  0.5<H>  /  AST  217<H>  /  ALT  142<H>  /  AlkPhos  273<H>  06-12                                                      MEDICATIONS  (STANDING):  LORazepam   Injectable 2 milliGRAM(s) IV Push every 4 hours  methadone    Tablet 110 milliGRAM(s) Oral daily  morphine  Infusion 3 mG/Hr (3 mL/Hr) IV Continuous <Continuous>  nicotine - 21 mG/24Hr(s) Patch 1 patch Transdermal daily    MEDICATIONS  (PRN):  LORazepam   Injectable 2 milliGRAM(s) IV Push every 2 hours PRN Agitation  morphine  - Injectable 3 milliGRAM(s) IV Push every 1 hour PRN Severe Pain (7 - 10)

## 2018-06-13 NOTE — PROGRESS NOTE ADULT - ASSESSMENT
1. Metastatic Hepatocellular carcinoma with ascites, peritoneal nodules admitted initially for therapuetic paracentesis. Hospital course complicated by fevers, lethargy seconadry to SBP. History is also complicated with HIV, alcohol  abuse.    Given his multiple co morbidities and worsening clinical status, family have decided on comfort measures only which is reasonable.    --On morphine drip.

## 2018-06-13 NOTE — PROGRESS NOTE ADULT - ASSESSMENT
46 y/o male with PMH as above who is being evaluated for pain/goals of care. Patient was sleeping comfortably at time of assessment, but overnight event was noted. Patient had a total of 66mg of morphine and 14mg of Ativan in 24hrs. Meds will be adjusted to attain comfort. Patient's wife at bedside reported that patient's appetite is very poor. Palliative team will continue to provide emotional and supportive counseling.       Plan  Change Ativan to 2mg iv q4hrs RTC and 2mg q2hrs PRN Agitation  D/c Morphine and start Morphine drip at 3mg/hr and 3mg iv PRN Pain and resp distress  Continue comfort measures only  Known poor prognosis

## 2018-06-13 NOTE — PROGRESS NOTE ADULT - ASSESSMENT
46 y/o male with PMH as above who is being evaluated for pain/goals of care. Patient was sleeping comfortably at time of assessment, but overnight event was noted. 46 y/o male with PMH as above who is being evaluated for pain/goals of care. Patient was sleeping comfortably at time of assessment, but overnight event was noted. Patient had a total of 66mg of morphine and 14mg of Ativan in 24hrs. Meds will be adjusted to attain comfort. Patient's wife at bedside reported that patient's appetite is very poor. Palliative team will continue to provide emotional and supportive counseling.       Plan  Change Ativan to 2mg iv q4hrs RTC and 2mg q2hrs PRN Agitation  D/c Morphine and start Morphine drip at 3mg/hr and 3mg iv PRN Pain and resp distress  Continue comfort measures only  Known poor prognosis

## 2018-06-13 NOTE — PROGRESS NOTE ADULT - SUBJECTIVE AND OBJECTIVE BOX
Patient seen and examined. EVents over last 48 hours noted. Still lethargic and family has decided to go with hospice care which is reasonble.            HPI:    48 yo M w/ h/o HIV on HAART therapy, liver cirrhosis/liver carcinoma, Hepatitis C infection s/p treatment, alcohol abuse, withdrawal seizures in past, h/o heroin abuse-on methadone program since    2 weeks now  sent to ER  for therapeutic paracentesis.    Patient was treated for Hepatitis C last year. In November 2017, he was noted to have Liver lesion on routine sonogram, was evaluated by [facility] and Resection was performed. In February 2018, on a surveillance sonogram, was found to have multiple liver and diaphragmatic nodules.   CT chest showed multiple Lung nodules.   He was deemed not a candidate for surgery or chemotherapy    In ED, 3 L of ascitic fluid was removed and pt was supposed to be d/luis from ED but due to low sodium on BMP he was admitted.   However Post paracentesis, He was found to be more lethargic, spiking fevers secondary to SBP. On lactulose and Iv rocephin and mental status improving.        PAST MEDICAL & SURGICAL HISTORY:  PPD+ (purified protein derivative positive)  Hepatitis-C: s/p treatment  Nicotine dependence  HIV (human immunodeficiency virus infection): on HAART  EtOH dependence  H/O resection of liver: partial        FAMILY HISTORY:  No pertinent family history in first degree relatives      Social History: Drug abuse, alcohol abuse    Allergies    No Known Allergies    Intolerances        MEDICATIONS  (STANDING):  cefTRIAXone   IVPB 2 Gram(s) IV Intermittent every 24 hours  darunavir 600 milliGRAM(s) Oral two times a day  dronabinol 10 milliGRAM(s) Oral two times a day  enoxaparin Injectable 30 milliGRAM(s) SubCutaneous daily  etravirine 200 milliGRAM(s) Oral two times a day after meals  folic acid 1 milliGRAM(s) Oral daily  furosemide    Tablet 40 milliGRAM(s) Oral daily  lactulose Retention Enema 200 Gram(s) Rectal two times a day  lactulose Syrup 20 Gram(s) Oral every 2 hours  LORazepam   Injectable 1 milliGRAM(s) IV Push every 4 hours  LORazepam   Injectable   IV Push   methadone    Tablet 110 milliGRAM(s) Oral daily  multivitamin/minerals 1 Tablet(s) Oral daily  nicotine - 21 mG/24Hr(s) Patch 1 patch Transdermal daily  raltegravir Tablet 400 milliGRAM(s) Oral two times a day  rifaximin 550 milliGRAM(s) Oral two times a day  ritonavir Tablet 100 milliGRAM(s) Oral two times a day  spironolactone 100 milliGRAM(s) Oral daily  thiamine 100 milliGRAM(s) Oral daily    MEDICATIONS  (PRN):  ibuprofen  Tablet 400 milliGRAM(s) Oral three times a day PRN Temp > 100.4  morphine  - Injectable 2 milliGRAM(s) IV Push every 6 hours PRN Severe Pain (7 - 10)  traMADol 50 milliGRAM(s) Oral two times a day PRN Severe Pain (7 - 10)      Vital Signs Last 24 Hrs  T(C): 36.7 (08 Jun 2018 06:30), Max: 37.9 (07 Jun 2018 20:51)  T(F): 98.1 (08 Jun 2018 06:30), Max: 100.3 (07 Jun 2018 20:51)  HR: 108 (08 Jun 2018 06:30) (104 - 111)  BP: 114/78 (08 Jun 2018 06:30) (97/62 - 114/78)  BP(mean): --  RR: 16 (08 Jun 2018 06:30) (12 - 20)  SpO2: --    ROS:  Negative except for: see HPI      PHYSICAL EXAM:  GEN: No acute distress  LUNGS: Decreased Breath sounds at bases  HEART: S1/S2 present. RRR.   ABD: firm and distended. no ttp.  Bowel sounds present  NEURO: AA0 x 2.     LABS:                          12.0   10.79 )-----------( 199      ( 12 Jun 2018 07:30 )             36.6   06-12    121<L>  |  86<L>  |  13  ----------------------------<  103<H>  4.3   |  23  |  0.5<L>    Ca    8.2<L>      12 Jun 2018 07:30  Phos  2.9     06-12  Mg     1.6     06-12    TPro  6.1  /  Alb  2.2<L>  /  TBili  0.7  /  DBili  0.5<H>  /  AST  217<H>  /  ALT  142<H>  /  AlkPhos  273<H>  06-12        06-08    127<L>  |  87<L>  |  11  ----------------------------<  110<H>  4.3   |  26  |  0.6<L>    Ca    8.5      08 Jun 2018 07:38  Phos  2.9     06-06  Mg     1.9     06-08    TPro  6.6  /  Alb  2.6<L>  /  TBili  1.3<H>  /  DBili  0.9<H>  /  AST  765<H>  /  ALT  279<H>  /  AlkPhos  265<H>  06-08      LIVER FUNCTIONS - ( 08 Jun 2018 07:38 )  Alb: 2.6 g/dL / Pro: 6.6 g/dL / ALK PHOS: 265 U/L / ALT: 279 U/L / AST: 765 U/L / GGT: x           Cell Count, Body Fluid (06.07.18 @ 11:27)    Monocyte/Macrophage Count - Body Fluid: many %    Fluid Segmented Granulocytes: 75% %    Fluid Type: Other    Body Fluid Appearance: Cloudy    BF Lymphocytes: 25%    Other Body Cells: plasma cells %    Tube Type: Lavender    Color - Body Fluid: Orange    Total Nucleated Cell Count, Body Fluid: 1314 /uL    Total RBC Count: 7000 /uL          RADIOLOGY & ADDITIONAL STUDIES:      < from: CT Abdomen and Pelvis No Cont (06.07.18 @ 06:26) >    IMPRESSION:        1. Since December 29, 2017, new diffuse heterogeneity of the left hepatic   lobe, suspicious for infiltrative hepatic tumor.    2. Moderate volume abdominopelvic ascites and innumerable subcentimeter   peritoneal soft tissue nodules, suspicious for peritoneal metastases.    3. Multiple subcentimeter and mildly enlarged retroperitoneal lymph   nodes, measuring up to 2.2 x 1.7 cm within the left para-aortic region.    4. Small left pleural effusion and small bibasilar airspace opacities,   possibly reflecting atelectasis or pneumonia in the appropriate clinical   setting; follow-up to resolution is suggested.    < end of copied text >

## 2018-06-13 NOTE — PROGRESS NOTE ADULT - SUBJECTIVE AND OBJECTIVE BOX
This is a 46 y/o male with H/O  HIV on HAART therapy, liver cirrhosis/liver carcinoma, Hepatitis C infection s/p treatment, alcohol abuse, withdrawal seizures in past, h/o heroin abuse-on methadone program who was admitted for therapeutic paracentesis. Hospital course c/b encephalopathy, poor po intake w refractory pain. Palliative team net with family yesterday . Family aware of patient's grave condition and decided to pursue comfort measures only with consideration for Hospice care if symptoms controlled and bp stable.   Patient was seen and examined, patient was very uncomfortable and agitated overnight as reported by RN and  spouse who spent the night by the bedside.     PHYSICAL EXAM    T(C): , Max: 37.6 (17:03)  T(F): 97.1  HR: 100 (100 - 107)  BP: 130/85 (110/76 - 130/85)  RR: 18 (18 - 20)  SpO2: --                                    12.0   10.79 )-----------( 199      ( 12 Jun 2018 07:30 )             36.6                                                                                      06-12    121<L>  |  86<L>  |  13  ----------------------------<  103<H>  4.3   |  23  |  0.5<L>    Ca    8.2<L>      12 Jun 2018 07:30  Phos  2.9     06-12  Mg     1.6     06-12    TPro  6.1  /  Alb  2.2<L>  /  TBili  0.7  /  DBili  0.5<H>  /  AST  217<H>  /  ALT  142<H>  /  AlkPhos  273<H>  06-12                                                      MEDICATIONS  (STANDING):  LORazepam   Injectable 2 milliGRAM(s) IV Push every 4 hours  methadone    Tablet 110 milliGRAM(s) Oral daily  morphine  - Injectable 10 milliGRAM(s) IV Push every 4 hours  nicotine - 21 mG/24Hr(s) Patch 1 patch Transdermal daily    MEDICATIONS  (PRN):  LORazepam   Injectable 2 milliGRAM(s) IV Push every 2 hours PRN Agitation  morphine  - Injectable 10 milliGRAM(s) IV Push every 2 hours PRN respiratory distress This is a 48 y/o male with H/O  HIV on HAART therapy, liver cirrhosis/liver carcinoma, Hepatitis C infection s/p treatment, alcohol abuse, withdrawal seizures in past, h/o heroin abuse-on methadone program who was admitted for therapeutic paracentesis. Hospital course c/b encephalopathy, poor po intake w refractory pain. Palliative team net with family yesterday . Family aware of patient's grave condition and decided to pursue comfort measures only with consideration for Hospice care if symptoms controlled and bp stable.   Patient was seen and examined, patient was very uncomfortable and agitated overnight as reported by RN and  spouse who spent the night by the bedside.     PHYSICAL EXAM  GEN: somnolence but arousable, NAD    LUNGS: Clear to auscultation bilaterally   HEART: S1/S2 present. RRR.   ABD: + distended, firm  EXT: NC/NC/NE/2+PP/RUTH          T(C): , Max: 37.6 (17:03)  T(F): 97.1  HR: 100 (100 - 107)  BP: 130/85 (110/76 - 130/85)  RR: 18 (18 - 20)  SpO2: --                                    12.0   10.79 )-----------( 199      ( 12 Jun 2018 07:30 )             36.6                                                                                      06-12    121<L>  |  86<L>  |  13  ----------------------------<  103<H>  4.3   |  23  |  0.5<L>    Ca    8.2<L>      12 Jun 2018 07:30  Phos  2.9     06-12  Mg     1.6     06-12    TPro  6.1  /  Alb  2.2<L>  /  TBili  0.7  /  DBili  0.5<H>  /  AST  217<H>  /  ALT  142<H>  /  AlkPhos  273<H>  06-12                                                      MEDICATIONS  (STANDING):  LORazepam   Injectable 2 milliGRAM(s) IV Push every 4 hours  methadone    Tablet 110 milliGRAM(s) Oral daily  morphine  - Injectable 10 milliGRAM(s) IV Push every 4 hours  nicotine - 21 mG/24Hr(s) Patch 1 patch Transdermal daily    MEDICATIONS  (PRN):  LORazepam   Injectable 2 milliGRAM(s) IV Push every 2 hours PRN Agitation  morphine  - Injectable 10 milliGRAM(s) IV Push every 2 hours PRN respiratory distress This is a 46 y/o male with H/O  HIV on HAART therapy, liver cirrhosis/liver carcinoma, Hepatitis C infection s/p treatment, alcohol abuse, withdrawal seizures in past, h/o heroin abuse-on methadone program who was admitted for therapeutic paracentesis. Hospital course c/b encephalopathy, poor po intake w refractory pain. Palliative team net with family yesterday . Family aware of patient's grave condition and decided to pursue comfort measures only with consideration for Hospice care if symptoms controlled and bp stable.   Patient was seen and examined, patient was very uncomfortable and agitated overnight as reported by RN and  spouse who spent the night by the bedside.     PHYSICAL EXAM  GEN: somnolence but arousable, NAD    LUNGS: Clear to auscultation bilaterally   HEART: S1/S2 present. RRR.   ABD: + distended, firm            T(C): , Max: 37.6 (17:03)  T(F): 97.1  HR: 100 (100 - 107)  BP: 130/85 (110/76 - 130/85)  RR: 18 (18 - 20)  SpO2: --                                    12.0   10.79 )-----------( 199      ( 12 Jun 2018 07:30 )             36.6                                                                                      06-12    121<L>  |  86<L>  |  13  ----------------------------<  103<H>  4.3   |  23  |  0.5<L>    Ca    8.2<L>      12 Jun 2018 07:30  Phos  2.9     06-12  Mg     1.6     06-12    TPro  6.1  /  Alb  2.2<L>  /  TBili  0.7  /  DBili  0.5<H>  /  AST  217<H>  /  ALT  142<H>  /  AlkPhos  273<H>  06-12                                                      MEDICATIONS  (STANDING):  LORazepam   Injectable 2 milliGRAM(s) IV Push every 4 hours  methadone    Tablet 110 milliGRAM(s) Oral daily  morphine  - Injectable 10 milliGRAM(s) IV Push every 4 hours  nicotine - 21 mG/24Hr(s) Patch 1 patch Transdermal daily    MEDICATIONS  (PRN):  LORazepam   Injectable 2 milliGRAM(s) IV Push every 2 hours PRN Agitation  morphine  - Injectable 10 milliGRAM(s) IV Push every 2 hours PRN respiratory distress

## 2018-06-14 RX ORDER — MORPHINE SULFATE 50 MG/1
5 CAPSULE, EXTENDED RELEASE ORAL
Qty: 100 | Refills: 0 | Status: DISCONTINUED | OUTPATIENT
Start: 2018-06-14 | End: 2018-06-16

## 2018-06-14 RX ORDER — METHADONE HYDROCHLORIDE 40 MG/1
110 TABLET ORAL DAILY
Qty: 0 | Refills: 0 | Status: DISCONTINUED | OUTPATIENT
Start: 2018-06-14 | End: 2018-06-15

## 2018-06-14 RX ORDER — MORPHINE SULFATE 50 MG/1
4 CAPSULE, EXTENDED RELEASE ORAL ONCE
Qty: 0 | Refills: 0 | Status: DISCONTINUED | OUTPATIENT
Start: 2018-06-14 | End: 2018-06-14

## 2018-06-14 RX ADMIN — Medication 2 MILLIGRAM(S): at 15:33

## 2018-06-14 RX ADMIN — Medication 2 MILLIGRAM(S): at 06:25

## 2018-06-14 RX ADMIN — METHADONE HYDROCHLORIDE 110 MILLIGRAM(S): 40 TABLET ORAL at 13:47

## 2018-06-14 RX ADMIN — MORPHINE SULFATE 3 MILLIGRAM(S): 50 CAPSULE, EXTENDED RELEASE ORAL at 19:38

## 2018-06-14 RX ADMIN — Medication 2 MILLIGRAM(S): at 10:01

## 2018-06-14 RX ADMIN — Medication 2 MILLIGRAM(S): at 01:11

## 2018-06-14 RX ADMIN — MORPHINE SULFATE 4 MILLIGRAM(S): 50 CAPSULE, EXTENDED RELEASE ORAL at 04:07

## 2018-06-14 RX ADMIN — Medication 2 MILLIGRAM(S): at 04:06

## 2018-06-14 RX ADMIN — MORPHINE SULFATE 3 MILLIGRAM(S): 50 CAPSULE, EXTENDED RELEASE ORAL at 02:10

## 2018-06-14 RX ADMIN — Medication 1 PATCH: at 13:00

## 2018-06-14 RX ADMIN — Medication 2 MG/KG/HR: at 21:34

## 2018-06-14 RX ADMIN — Medication 3.55 MG/KG/HR: at 13:50

## 2018-06-14 RX ADMIN — MORPHINE SULFATE 3 MILLIGRAM(S): 50 CAPSULE, EXTENDED RELEASE ORAL at 22:19

## 2018-06-14 RX ADMIN — Medication 3.55 MG/KG/HR: at 13:47

## 2018-06-14 RX ADMIN — MORPHINE SULFATE 3 MILLIGRAM(S): 50 CAPSULE, EXTENDED RELEASE ORAL at 19:34

## 2018-06-14 RX ADMIN — MORPHINE SULFATE 3 MILLIGRAM(S): 50 CAPSULE, EXTENDED RELEASE ORAL at 09:18

## 2018-06-14 RX ADMIN — MORPHINE SULFATE 3 MILLIGRAM(S): 50 CAPSULE, EXTENDED RELEASE ORAL at 16:32

## 2018-06-14 RX ADMIN — Medication 1 PATCH: at 13:46

## 2018-06-14 RX ADMIN — Medication 2 MILLIGRAM(S): at 02:10

## 2018-06-14 NOTE — PROGRESS NOTE ADULT - SUBJECTIVE AND OBJECTIVE BOX
47yMale with diagnosis: HYPONATREMIA;ASCITES      Patient seen for terminal restlessness      PHYSICAL EXAM    T(C): , Max: 36.9 (07:51)  T(F): 98.4  HR: 121 (121 - 121)  BP: 120/82 (120/82 - 120/82)  RR: 20 (20 - 20)  SpO2: --              LABS: no blood draws                                                                                                                                               MEDICATIONS  (STANDING):  LORazepam   Injectable 2 milliGRAM(s) IV Push every 4 hours  LORazepam Infusion 0.05 mG/kG/Hr (3.55 mL/Hr) IV Continuous <Continuous>  morphine  Infusion 5 mG/Hr (5 mL/Hr) IV Continuous <Continuous>  nicotine - 21 mG/24Hr(s) Patch 1 patch Transdermal daily    MEDICATIONS  (PRN):  LORazepam   Injectable 2 milliGRAM(s) IV Push every 2 hours PRN Agitation  morphine  - Injectable 3 milliGRAM(s) IV Push every 1 hour PRN Severe Pain (7 - 10) 47yMale with diagnosis: HYPONATREMIA;ASCITES      Patient seen for terminal restlessness      PHYSICAL EXAM terminally ill man, intermittently agitated, sedated  PERRL  RRR  Abdom soft, +ascites  No edema    T(C): , Max: 36.9 (07:51)  T(F): 98.4  HR: 121 (121 - 121)  BP: 120/82 (120/82 - 120/82)  RR: 20 (20 - 20)  SpO2: --              LABS: no blood draws                                                                                                                                               MEDICATIONS  (STANDING):  LORazepam   Injectable 2 milliGRAM(s) IV Push every 4 hours  LORazepam Infusion 0.05 mG/kG/Hr (3.55 mL/Hr) IV Continuous <Continuous>  morphine  Infusion 5 mG/Hr (5 mL/Hr) IV Continuous <Continuous>  nicotine - 21 mG/24Hr(s) Patch 1 patch Transdermal daily    MEDICATIONS  (PRN):  LORazepam   Injectable 2 milliGRAM(s) IV Push every 2 hours PRN Agitation  morphine  - Injectable 3 milliGRAM(s) IV Push every 1 hour PRN Severe Pain (7 - 10)

## 2018-06-14 NOTE — PROGRESS NOTE ADULT - ASSESSMENT
47yMale being evaluated for restlessness and pain management. Pt on Morphine gtt at 5mg/HR has gotten 16mg PRN overnight, and is on RTC Ativan 2mg Q 4 HRS IV. Discussed with wife and nursing staff. Pt's goal is comfort, is still restless. Pt also retaining urine, put out 700cc yesterday could not void this am. Support provided to wife          Recommendations:  DNR/DNI CMO  D/C RTC ATivan push  start ATivan gtt 1mg/hr  Insert rivera for comfort to straight draining  Morphine 5mg gtt with PRN pushes, will increase based on response to Ativan gtt  d/w Dr Sheppard and Dr [place]

## 2018-06-14 NOTE — PROGRESS NOTE ADULT - SUBJECTIVE AND OBJECTIVE BOX
infectious diseases progress note:  JACINTO GAMEZ is a 47yMale patient    HYPONATREMIA;ASCITES        ROS:  CONSTITUTIONAL:  Negative fever or chills, feels well, good appetite  EYES:  Negative  blurry vision or double vision  CARDIOVASCULAR:  Negative for chest pain or palpitations  RESPIRATORY:  Negative for cough, wheezing, or SOB   GASTROINTESTINAL:  Negative for nausea, vomiting, diarrhea, constipation, or abdominal pain  GENITOURINARY:  Negative frequency, urgency or dysuria  NEUROLOGIC:  No headache, confusion, dizziness, lightheadedness    Allergies    No Known Allergies    Intolerances        ANTIBIOTICS/RELEVANT:  antimicrobials    immunologic:    OTHER:  LORazepam   Injectable 2 milliGRAM(s) IV Push every 2 hours PRN  LORazepam Infusion 0.05 mG/kG/Hr IV Continuous <Continuous>  morphine  - Injectable 3 milliGRAM(s) IV Push every 1 hour PRN  morphine  Infusion 5 mG/Hr IV Continuous <Continuous>  nicotine - 21 mG/24Hr(s) Patch 1 patch Transdermal daily      Objective:  T(F): 98.4 (06-14-18 @ 07:51), Max: 98.4 (06-14-18 @ 07:51)  HR: 121 (06-14-18 @ 07:51) (121 - 121)  BP: 120/82 (06-14-18 @ 07:51) (120/82 - 120/82)  RR: 20 (06-14-18 @ 07:51) (20 - 20)  SpO2: --    PHYSICAL EXAM  Constitutional:Well-developed, well nourished  Eyes:ALONSO, EOMI  Ear/Nose/Throat: no oral lesion, no sinus tenderness on percussion	  Neck:no JVD, no lymphadenopathy, supple  Respiratory: CTA clarissa  Cardiovascular: S1S2 RRR, no murmurs  Gastrointestinal:soft, (+) BS, no HSM  Extremities:no e/e/c                    Culture - Blood (collected 08 Jun 2018 07:38)  Source: .Blood None  Final Report (13 Jun 2018 16:00):    No growth at 5 days.    Culture - Blood (collected 07 Jun 2018 18:54)  Source: .Blood None  Final Report (13 Jun 2018 01:00):    No growth at 5 days.

## 2018-06-14 NOTE — PROGRESS NOTE ADULT - ASSESSMENT
Tmax 98.4    Off antimicrobials    Poor overall status    PLAN  Comfort measures  F/U palliative care

## 2018-06-15 RX ORDER — HALOPERIDOL DECANOATE 100 MG/ML
5 INJECTION INTRAMUSCULAR EVERY 6 HOURS
Qty: 0 | Refills: 0 | Status: DISCONTINUED | OUTPATIENT
Start: 2018-06-15 | End: 2018-06-22

## 2018-06-15 RX ORDER — MORPHINE SULFATE 50 MG/1
10 CAPSULE, EXTENDED RELEASE ORAL
Qty: 0 | Refills: 0 | Status: DISCONTINUED | OUTPATIENT
Start: 2018-06-15 | End: 2018-06-17

## 2018-06-15 RX ORDER — MORPHINE SULFATE 50 MG/1
10 CAPSULE, EXTENDED RELEASE ORAL
Qty: 0 | Refills: 0 | Status: DISCONTINUED | OUTPATIENT
Start: 2018-06-15 | End: 2018-06-16

## 2018-06-15 RX ORDER — MORPHINE SULFATE 50 MG/1
10 CAPSULE, EXTENDED RELEASE ORAL
Qty: 100 | Refills: 0 | Status: DISCONTINUED | OUTPATIENT
Start: 2018-06-15 | End: 2018-06-16

## 2018-06-15 RX ADMIN — HALOPERIDOL DECANOATE 5 MILLIGRAM(S): 100 INJECTION INTRAMUSCULAR at 15:20

## 2018-06-15 RX ADMIN — MORPHINE SULFATE 10 MILLIGRAM(S): 50 CAPSULE, EXTENDED RELEASE ORAL at 20:59

## 2018-06-15 RX ADMIN — Medication 2 MILLIGRAM(S): at 06:24

## 2018-06-15 RX ADMIN — MORPHINE SULFATE 3 MILLIGRAM(S): 50 CAPSULE, EXTENDED RELEASE ORAL at 14:40

## 2018-06-15 RX ADMIN — MORPHINE SULFATE 3 MILLIGRAM(S): 50 CAPSULE, EXTENDED RELEASE ORAL at 00:16

## 2018-06-15 RX ADMIN — MORPHINE SULFATE 10 MG/HR: 50 CAPSULE, EXTENDED RELEASE ORAL at 18:38

## 2018-06-15 RX ADMIN — MORPHINE SULFATE 3 MILLIGRAM(S): 50 CAPSULE, EXTENDED RELEASE ORAL at 17:51

## 2018-06-15 RX ADMIN — Medication 4 MILLIGRAM(S): at 22:28

## 2018-06-15 RX ADMIN — Medication 2 MILLIGRAM(S): at 00:55

## 2018-06-15 RX ADMIN — Medication 1 PATCH: at 14:36

## 2018-06-15 RX ADMIN — METHADONE HYDROCHLORIDE 110 MILLIGRAM(S): 40 TABLET ORAL at 12:29

## 2018-06-15 RX ADMIN — MORPHINE SULFATE 3 MILLIGRAM(S): 50 CAPSULE, EXTENDED RELEASE ORAL at 00:20

## 2018-06-15 RX ADMIN — Medication 2 MILLIGRAM(S): at 12:21

## 2018-06-15 RX ADMIN — Medication 1 PATCH: at 12:22

## 2018-06-15 RX ADMIN — MORPHINE SULFATE 3 MILLIGRAM(S): 50 CAPSULE, EXTENDED RELEASE ORAL at 12:22

## 2018-06-15 RX ADMIN — MORPHINE SULFATE 2 MILLIGRAM(S): 50 CAPSULE, EXTENDED RELEASE ORAL at 14:40

## 2018-06-15 RX ADMIN — MORPHINE SULFATE 3 MILLIGRAM(S): 50 CAPSULE, EXTENDED RELEASE ORAL at 05:33

## 2018-06-15 RX ADMIN — MORPHINE SULFATE 10 MILLIGRAM(S): 50 CAPSULE, EXTENDED RELEASE ORAL at 23:33

## 2018-06-15 RX ADMIN — HALOPERIDOL DECANOATE 5 MILLIGRAM(S): 100 INJECTION INTRAMUSCULAR at 20:52

## 2018-06-15 RX ADMIN — Medication 4 MILLIGRAM(S): at 23:56

## 2018-06-15 RX ADMIN — Medication 4 MG/KG/HR: at 13:40

## 2018-06-15 RX ADMIN — Medication 2 MILLIGRAM(S): at 09:00

## 2018-06-15 RX ADMIN — MORPHINE SULFATE 5 MG/HR: 50 CAPSULE, EXTENDED RELEASE ORAL at 01:52

## 2018-06-15 RX ADMIN — MORPHINE SULFATE 10 MG/HR: 50 CAPSULE, EXTENDED RELEASE ORAL at 17:54

## 2018-06-15 NOTE — PROGRESS NOTE ADULT - SUBJECTIVE AND OBJECTIVE BOX
Patient seen, noted moaning and restless in moderate distress. Overnight event noted. Spouse at bedside       PHYSICAL EXAM    Unchanged    T(C): , Max: 36.1 (15:00)  T(F): 96.7  HR: 108 (108 - 112)  BP: 119/88 (119/77 - 119/88)  RR: 18 (18 - 18)  SpO2: --                                                                                                                                                         MEDICATIONS  (STANDING):  LORazepam Infusion 0.028 mG/kG/Hr (2 mL/Hr) IV Continuous <Continuous>  methadone    Tablet 110 milliGRAM(s) Oral daily  morphine  Infusion 5 mG/Hr (5 mL/Hr) IV Continuous <Continuous>  nicotine - 21 mG/24Hr(s) Patch 1 patch Transdermal daily    MEDICATIONS  (PRN):  LORazepam   Injectable 2 milliGRAM(s) IV Push every 2 hours PRN Agitation  morphine  - Injectable 3 milliGRAM(s) IV Push every 1 hour PRN Severe Pain (7 - 10)

## 2018-06-15 NOTE — PROGRESS NOTE ADULT - ASSESSMENT
Tmax 97    Off antimicrobials    Poor overall status    Pt DNR    On LORazepam Infusion 0.028 mG/kG/Hr (2 mL/Hr) IV Continuous <Continuous>  methadone    Tablet 110 milliGRAM(s) Oral daily  morphine  Infusion 5 mG/Hr (5 mL/Hr) IV Continuous <Continuous>  nicotine - 21 mG/24Hr(s) Patch 1 patch Transdermal daily    PLAN  Comfort measures  Palliative care

## 2018-06-15 NOTE — PROGRESS NOTE ADULT - SUBJECTIVE AND OBJECTIVE BOX
infectious diseases progress note:  JACINTO GAMEZ is a 47yMale patient    HYPONATREMIA;ASCITES        ROS:  CONSTITUTIONAL:  Negative fever or chills, feels well, good appetite  EYES:  Negative  blurry vision or double vision  CARDIOVASCULAR:  Negative for chest pain or palpitations  RESPIRATORY:  Negative for cough, wheezing, or SOB   GASTROINTESTINAL:  Negative for nausea, vomiting, diarrhea, constipation, or abdominal pain  GENITOURINARY:  Negative frequency, urgency or dysuria  NEUROLOGIC:  No headache, confusion, dizziness, lightheadedness    Allergies    No Known Allergies    Intolerances        ANTIBIOTICS/RELEVANT:  antimicrobials    immunologic:    OTHER:  LORazepam   Injectable 2 milliGRAM(s) IV Push every 2 hours PRN  LORazepam Infusion 0.028 mG/kG/Hr IV Continuous <Continuous>  methadone    Tablet 110 milliGRAM(s) Oral daily  morphine  - Injectable 3 milliGRAM(s) IV Push every 1 hour PRN  morphine  Infusion 5 mG/Hr IV Continuous <Continuous>  nicotine - 21 mG/24Hr(s) Patch 1 patch Transdermal daily      Objective:  T(F): 96.7 (06-15-18 @ 06:45), Max: 97 (06-14-18 @ 15:00)  HR: 108 (06-15-18 @ 06:45) (108 - 112)  BP: 119/88 (06-15-18 @ 06:45) (119/77 - 119/88)  RR: 18 (06-15-18 @ 06:45) (18 - 18)  SpO2: --    PHYSICAL EXAM  Constitutional:Well-developed, well nourished  Eyes:ALONSO, EOMI  Ear/Nose/Throat: no oral lesion, no sinus tenderness on percussion	  Neck:no JVD, no lymphadenopathy, supple  Respiratory: CTA clarissa  Cardiovascular: S1S2 RRR, no murmurs  Gastrointestinal:soft, (+) BS, no HSM  Extremities:no e/e/c

## 2018-06-15 NOTE — PROGRESS NOTE ADULT - ASSESSMENT
This is a 48 y/o male being evaluated for comfort. Patient noted to be moaning and restless. Similar symptoms was reported overnight. Patient is on morphine drip at 5mg/hr and ativan drip at 2mg/hr. Patient was given addition prn of morphine(x5) and ativan (x3), meds will be adjusted. Patient has h/o illicit drug use. Patient is on 1:1 sit for safety. Discussed with spouse and nurse. Palliative team will continue to provide supportive care. Goals is to attain comfort.       Recommendation:  Increase morphine drip to 6mg/hr w prn  Increase ativan to 2.5mg/hr w prn  Comfort measures only This is a 46 y/o male being evaluated for comfort. Patient noted to be moaning and restless. Similar symptoms was reported overnight. Patient is on morphine drip at 5mg/hr and ativan drip at 2mg/hr. Patient was given addition prn of morphine(x5) and ativan (x3), meds will be adjusted. Patient has h/o illicit drug use. Patient is on 1:1 sit for safety. Discussed with spouse and nurse. Palliative team will continue to provide supportive care. Goals is to attain comfort.       Recommendation:  Increase morphine drip to 6mg/hr w prn  Increase ativan to 3mg/hr w prn  Haldol 5mg iv q4hrs  Comfort measures only

## 2018-06-16 RX ORDER — MORPHINE SULFATE 50 MG/1
4 CAPSULE, EXTENDED RELEASE ORAL ONCE
Qty: 0 | Refills: 0 | Status: DISCONTINUED | OUTPATIENT
Start: 2018-06-16 | End: 2018-06-16

## 2018-06-16 RX ORDER — MORPHINE SULFATE 50 MG/1
12 CAPSULE, EXTENDED RELEASE ORAL
Qty: 100 | Refills: 0 | Status: DISCONTINUED | OUTPATIENT
Start: 2018-06-16 | End: 2018-06-16

## 2018-06-16 RX ORDER — MORPHINE SULFATE 50 MG/1
16 CAPSULE, EXTENDED RELEASE ORAL
Qty: 100 | Refills: 0 | Status: DISCONTINUED | OUTPATIENT
Start: 2018-06-16 | End: 2018-06-17

## 2018-06-16 RX ADMIN — MORPHINE SULFATE 16 MG/HR: 50 CAPSULE, EXTENDED RELEASE ORAL at 12:35

## 2018-06-16 RX ADMIN — MORPHINE SULFATE 3 MILLIGRAM(S): 50 CAPSULE, EXTENDED RELEASE ORAL at 12:34

## 2018-06-16 RX ADMIN — Medication 4 MILLIGRAM(S): at 06:01

## 2018-06-16 RX ADMIN — Medication 4 MILLIGRAM(S): at 20:02

## 2018-06-16 RX ADMIN — MORPHINE SULFATE 16 MG/HR: 50 CAPSULE, EXTENDED RELEASE ORAL at 19:16

## 2018-06-16 RX ADMIN — MORPHINE SULFATE 3 MILLIGRAM(S): 50 CAPSULE, EXTENDED RELEASE ORAL at 16:56

## 2018-06-16 RX ADMIN — MORPHINE SULFATE 4 MILLIGRAM(S): 50 CAPSULE, EXTENDED RELEASE ORAL at 06:53

## 2018-06-16 RX ADMIN — Medication 4 MILLIGRAM(S): at 14:10

## 2018-06-16 RX ADMIN — MORPHINE SULFATE 3 MILLIGRAM(S): 50 CAPSULE, EXTENDED RELEASE ORAL at 21:53

## 2018-06-16 RX ADMIN — Medication 4 MILLIGRAM(S): at 12:37

## 2018-06-16 RX ADMIN — Medication 4 MILLIGRAM(S): at 18:24

## 2018-06-16 RX ADMIN — MORPHINE SULFATE 4 MILLIGRAM(S): 50 CAPSULE, EXTENDED RELEASE ORAL at 08:46

## 2018-06-16 RX ADMIN — Medication 4 MILLIGRAM(S): at 08:51

## 2018-06-16 RX ADMIN — MORPHINE SULFATE 16 MG/HR: 50 CAPSULE, EXTENDED RELEASE ORAL at 22:45

## 2018-06-16 RX ADMIN — MORPHINE SULFATE 2 MILLIGRAM(S): 50 CAPSULE, EXTENDED RELEASE ORAL at 14:16

## 2018-06-16 RX ADMIN — Medication 1 PATCH: at 11:21

## 2018-06-16 RX ADMIN — MORPHINE SULFATE 12 MG/HR: 50 CAPSULE, EXTENDED RELEASE ORAL at 02:55

## 2018-06-16 RX ADMIN — MORPHINE SULFATE 3 MILLIGRAM(S): 50 CAPSULE, EXTENDED RELEASE ORAL at 22:26

## 2018-06-16 RX ADMIN — MORPHINE SULFATE 3 MILLIGRAM(S): 50 CAPSULE, EXTENDED RELEASE ORAL at 23:52

## 2018-06-16 RX ADMIN — Medication 4 MILLIGRAM(S): at 02:08

## 2018-06-16 RX ADMIN — Medication 5 MG/KG/HR: at 10:32

## 2018-06-16 RX ADMIN — Medication 4 MILLIGRAM(S): at 22:01

## 2018-06-16 RX ADMIN — Medication 4 MILLIGRAM(S): at 16:49

## 2018-06-16 RX ADMIN — HALOPERIDOL DECANOATE 5 MILLIGRAM(S): 100 INJECTION INTRAMUSCULAR at 02:51

## 2018-06-16 RX ADMIN — MORPHINE SULFATE 10 MILLIGRAM(S): 50 CAPSULE, EXTENDED RELEASE ORAL at 16:56

## 2018-06-16 RX ADMIN — Medication 5 MG/KG/HR: at 21:47

## 2018-06-16 RX ADMIN — MORPHINE SULFATE 3 MILLIGRAM(S): 50 CAPSULE, EXTENDED RELEASE ORAL at 20:55

## 2018-06-16 RX ADMIN — Medication 1 PATCH: at 11:20

## 2018-06-16 RX ADMIN — MORPHINE SULFATE 16 MG/HR: 50 CAPSULE, EXTENDED RELEASE ORAL at 16:50

## 2018-06-16 RX ADMIN — Medication 4 MILLIGRAM(S): at 04:11

## 2018-06-16 RX ADMIN — MORPHINE SULFATE 3 MILLIGRAM(S): 50 CAPSULE, EXTENDED RELEASE ORAL at 10:30

## 2018-06-16 RX ADMIN — MORPHINE SULFATE 3 MILLIGRAM(S): 50 CAPSULE, EXTENDED RELEASE ORAL at 20:24

## 2018-06-16 RX ADMIN — Medication 2 MILLIGRAM(S): at 14:58

## 2018-06-16 RX ADMIN — MORPHINE SULFATE 10 MILLIGRAM(S): 50 CAPSULE, EXTENDED RELEASE ORAL at 06:02

## 2018-06-16 RX ADMIN — MORPHINE SULFATE 16 MG/HR: 50 CAPSULE, EXTENDED RELEASE ORAL at 23:15

## 2018-06-16 RX ADMIN — MORPHINE SULFATE 16 MG/HR: 50 CAPSULE, EXTENDED RELEASE ORAL at 10:31

## 2018-06-17 RX ORDER — MORPHINE SULFATE 50 MG/1
10 CAPSULE, EXTENDED RELEASE ORAL
Qty: 0 | Refills: 0 | Status: DISCONTINUED | OUTPATIENT
Start: 2018-06-17 | End: 2018-06-22

## 2018-06-17 RX ORDER — MORPHINE SULFATE 50 MG/1
20 CAPSULE, EXTENDED RELEASE ORAL
Qty: 100 | Refills: 0 | Status: DISCONTINUED | OUTPATIENT
Start: 2018-06-17 | End: 2018-06-18

## 2018-06-17 RX ADMIN — Medication 4 MILLIGRAM(S): at 07:38

## 2018-06-17 RX ADMIN — MORPHINE SULFATE 20 MG/HR: 50 CAPSULE, EXTENDED RELEASE ORAL at 21:17

## 2018-06-17 RX ADMIN — Medication 4 MILLIGRAM(S): at 16:16

## 2018-06-17 RX ADMIN — Medication 4 MILLIGRAM(S): at 06:01

## 2018-06-17 RX ADMIN — Medication 4 MILLIGRAM(S): at 02:04

## 2018-06-17 RX ADMIN — MORPHINE SULFATE 3 MILLIGRAM(S): 50 CAPSULE, EXTENDED RELEASE ORAL at 12:05

## 2018-06-17 RX ADMIN — Medication 4 MILLIGRAM(S): at 18:55

## 2018-06-17 RX ADMIN — MORPHINE SULFATE 16 MG/HR: 50 CAPSULE, EXTENDED RELEASE ORAL at 16:16

## 2018-06-17 RX ADMIN — MORPHINE SULFATE 3 MILLIGRAM(S): 50 CAPSULE, EXTENDED RELEASE ORAL at 00:22

## 2018-06-17 RX ADMIN — Medication 4 MILLIGRAM(S): at 14:27

## 2018-06-17 RX ADMIN — MORPHINE SULFATE 3 MILLIGRAM(S): 50 CAPSULE, EXTENDED RELEASE ORAL at 03:30

## 2018-06-17 RX ADMIN — MORPHINE SULFATE 3 MILLIGRAM(S): 50 CAPSULE, EXTENDED RELEASE ORAL at 13:22

## 2018-06-17 RX ADMIN — MORPHINE SULFATE 20 MG/HR: 50 CAPSULE, EXTENDED RELEASE ORAL at 17:29

## 2018-06-17 RX ADMIN — Medication 5 MG/KG/HR: at 13:06

## 2018-06-17 RX ADMIN — MORPHINE SULFATE 3 MILLIGRAM(S): 50 CAPSULE, EXTENDED RELEASE ORAL at 14:25

## 2018-06-17 RX ADMIN — MORPHINE SULFATE 10 MILLIGRAM(S): 50 CAPSULE, EXTENDED RELEASE ORAL at 01:25

## 2018-06-17 RX ADMIN — MORPHINE SULFATE 3 MILLIGRAM(S): 50 CAPSULE, EXTENDED RELEASE ORAL at 09:53

## 2018-06-17 RX ADMIN — Medication 4 MILLIGRAM(S): at 10:17

## 2018-06-17 RX ADMIN — MORPHINE SULFATE 16 MG/HR: 50 CAPSULE, EXTENDED RELEASE ORAL at 04:50

## 2018-06-17 RX ADMIN — Medication 4 MILLIGRAM(S): at 12:07

## 2018-06-17 RX ADMIN — MORPHINE SULFATE 10 MILLIGRAM(S): 50 CAPSULE, EXTENDED RELEASE ORAL at 00:56

## 2018-06-17 RX ADMIN — MORPHINE SULFATE 20 MG/HR: 50 CAPSULE, EXTENDED RELEASE ORAL at 18:42

## 2018-06-17 RX ADMIN — MORPHINE SULFATE 16 MG/HR: 50 CAPSULE, EXTENDED RELEASE ORAL at 04:21

## 2018-06-17 RX ADMIN — MORPHINE SULFATE 16 MG/HR: 50 CAPSULE, EXTENDED RELEASE ORAL at 11:15

## 2018-06-17 RX ADMIN — MORPHINE SULFATE 3 MILLIGRAM(S): 50 CAPSULE, EXTENDED RELEASE ORAL at 06:10

## 2018-06-17 RX ADMIN — Medication 1 PATCH: at 11:16

## 2018-06-17 RX ADMIN — MORPHINE SULFATE 3 MILLIGRAM(S): 50 CAPSULE, EXTENDED RELEASE ORAL at 02:57

## 2018-06-17 RX ADMIN — MORPHINE SULFATE 20 MG/HR: 50 CAPSULE, EXTENDED RELEASE ORAL at 21:50

## 2018-06-17 RX ADMIN — Medication 4 MILLIGRAM(S): at 04:05

## 2018-06-17 RX ADMIN — Medication 1 PATCH: at 12:05

## 2018-06-17 RX ADMIN — MORPHINE SULFATE 3 MILLIGRAM(S): 50 CAPSULE, EXTENDED RELEASE ORAL at 05:37

## 2018-06-17 RX ADMIN — Medication 4 MILLIGRAM(S): at 00:12

## 2018-06-17 NOTE — CHART NOTE - NSCHARTNOTEFT_GEN_A_CORE
Patient's wife concerned that patient is in pain and uncomfortable. Upon exam patient appears to be grunting in pain, responds to voice with groaning. Patient on morphine drip 16mg/hr and 3mg PRN but requiring PRN pushes most hours when due. Discussed with palliative team and will increase morphine drip to 20mg/hour and breakthrough pushes to 10 PRN q1h.

## 2018-06-18 RX ORDER — MORPHINE SULFATE 50 MG/1
20 CAPSULE, EXTENDED RELEASE ORAL
Qty: 100 | Refills: 0 | Status: DISCONTINUED | OUTPATIENT
Start: 2018-06-18 | End: 2018-06-22

## 2018-06-18 RX ADMIN — MORPHINE SULFATE 20 MG/HR: 50 CAPSULE, EXTENDED RELEASE ORAL at 02:57

## 2018-06-18 RX ADMIN — Medication 1 PATCH: at 11:52

## 2018-06-18 RX ADMIN — MORPHINE SULFATE 20 MG/HR: 50 CAPSULE, EXTENDED RELEASE ORAL at 02:25

## 2018-06-18 RX ADMIN — Medication 1 PATCH: at 11:55

## 2018-06-18 RX ADMIN — MORPHINE SULFATE 20 MG/HR: 50 CAPSULE, EXTENDED RELEASE ORAL at 20:32

## 2018-06-18 NOTE — PROGRESS NOTE ADULT - SUBJECTIVE AND OBJECTIVE BOX
47yMale with diagnosis: HYPONATREMIA;ASCITES    Patient seen and examined. Patient is lethargic. No overnight event noted.      PHYSICAL EXAM   Unchanged                                                                                                                            MEDICATIONS  (STANDING):  LORazepam   Injectable 4 milliGRAM(s) IV Push every 2 hours  LORazepam Infusion 0.07 mG/kG/Hr (5 mL/Hr) IV Continuous <Continuous>  nicotine - 21 mG/24Hr(s) Patch 1 patch Transdermal daily    MEDICATIONS  (PRN):  haloperidol    Injectable 5 milliGRAM(s) IV Push every 6 hours PRN agitation  morphine  - Injectable 10 milliGRAM(s) IV Push every 1 hour PRN Breakthrough pain

## 2018-06-18 NOTE — PROGRESS NOTE ADULT - SUBJECTIVE AND OBJECTIVE BOX
infectious diseases progress note:  JACINTO GAMEZ is a 47yMale patient    HYPONATREMIA;ASCITES        ROS:  CONSTITUTIONAL:  Negative fever or chills, feels well, good appetite  EYES:  Negative  blurry vision or double vision  CARDIOVASCULAR:  Negative for chest pain or palpitations  RESPIRATORY:  Negative for cough, wheezing, or SOB   GASTROINTESTINAL:  Negative for nausea, vomiting, diarrhea, constipation, or abdominal pain  GENITOURINARY:  Negative frequency, urgency or dysuria  NEUROLOGIC:  No headache, confusion, dizziness, lightheadedness    Allergies    No Known Allergies    Intolerances        ANTIBIOTICS/RELEVANT:  antimicrobials    immunologic:    OTHER:  haloperidol    Injectable 5 milliGRAM(s) IV Push every 6 hours PRN  LORazepam   Injectable 4 milliGRAM(s) IV Push every 2 hours  LORazepam Infusion 0.07 mG/kG/Hr IV Continuous <Continuous>  morphine  - Injectable 10 milliGRAM(s) IV Push every 1 hour PRN  morphine  Infusion 20 mG/Hr IV Continuous <Continuous>  nicotine - 21 mG/24Hr(s) Patch 1 patch Transdermal daily      Objective:  T(F): --  HR: --  BP: --  RR: --  SpO2: --    PHYSICAL EXAM  Constitutional:Well-developed, well nourished  Eyes:ALONSO, EOMI  Ear/Nose/Throat: no oral lesion, no sinus tenderness on percussion	  Neck:no JVD, no lymphadenopathy, supple  Respiratory: CTA clarissa  Cardiovascular: S1S2 RRR, no murmurs  Gastrointestinal:soft, (+) BS, no HSM  Extremities:no e/e/c

## 2018-06-18 NOTE — PROGRESS NOTE ADULT - ASSESSMENT
This is a 46 y/o male being evaluated for comfort. Patient on  Morphine drip @ 20mg/hr and Ativan 5mg/hr for terminal sedation. Family at bedside, and palliative NP d/w dying process with them. Palliative team will continue to provide emotional and supportive care.     Case d/w Dr Mccloud and RN

## 2018-06-18 NOTE — PROGRESS NOTE ADULT - ATTENDING COMMENTS
Pt seen, medication titrated accordingly for pt's comfort. Pt now resting comfortably.  Appears in end stages of life.    Cont morphine 20mg /hr w ativan 5mg/hr    Pt will pass away in hospital. Family aware.
d/w team - findings and plan as noted above  d/w pt's RN  - pt NPO due to lethargy/ encephalopathy  - giving 5-7 day course of PPN in the hopes that in the interim the encephalopathy will improve with treatment, and then the pt can eat safely po.  - do not stop or interrupt PPN
pt seen today 6/13 -  lethargic, not in distress, but deteriorating.  PPN d/c yesterday.  Palliative team appreciated.  No further interventions planned, prognosis poor.  Recall us prn.
Patient seen, case d/w nursing staff. Pt intermittently agitated w moderate distress and signs of pain despite morphine gtt titration and ativan gtt. Unable to tolerate methadone maintenance.     Pt w high opioid demand given extensive substance abuse history:  Increase morphine gtt: 10mg/hr, morphine 10mg IVP q2h PRN pain/distress  Increase  ativan gtt: 4mg/hr  Add haldol SL q6h PRN refractory agitation  continue comfort measures only w end of life care    Pt will likely  in hospital. Family aware  Call [phone] prn
Pt seen, plan as above.     Pt will likely  in hospital. Family aware.

## 2018-06-18 NOTE — PROGRESS NOTE ADULT - ASSESSMENT
Discussed with Palliative care    Off antimicrobials    Poor overall status    Pt DNR    On haloperidol    Injectable 5 milliGRAM(s) IV Push every 6 hours PRN  LORazepam   Injectable 4 milliGRAM(s) IV Push every 2 hours  LORazepam Infusion 0.07 mG/kG/Hr IV Continuous <Continuous>  morphine  - Injectable 10 milliGRAM(s) IV Push every 1 hour PRN  morphine  Infusion 20 mG/Hr IV Continuous <Continuous>  nicotine - 21 mG/24Hr(s) Patch 1 patch Transdermal daily    PLAN  Continue Comfort measures

## 2018-06-19 RX ADMIN — MORPHINE SULFATE 20 MG/HR: 50 CAPSULE, EXTENDED RELEASE ORAL at 23:54

## 2018-06-19 RX ADMIN — Medication 5 MG/KG/HR: at 04:53

## 2018-06-19 RX ADMIN — Medication 1 PATCH: at 11:26

## 2018-06-19 RX ADMIN — MORPHINE SULFATE 20 MG/HR: 50 CAPSULE, EXTENDED RELEASE ORAL at 01:31

## 2018-06-19 RX ADMIN — Medication 5 MG/KG/HR: at 22:27

## 2018-06-19 NOTE — PROGRESS NOTE ADULT - ASSESSMENT
Off antimicrobials    Poor overall status    Pt DNR    On haloperidol    Injectable 5 milliGRAM(s) IV Push every 6 hours PRN  LORazepam   Injectable 4 milliGRAM(s) IV Push every 2 hours  LORazepam Infusion 0.07 mG/kG/Hr IV Continuous <Continuous>  morphine  - Injectable 10 milliGRAM(s) IV Push every 1 hour PRN  morphine  Infusion 20 mG/Hr IV Continuous <Continuous>  nicotine - 21 mG/24Hr(s) Patch 1 patch Transdermal daily    PLAN  Continue Comfort measures

## 2018-06-19 NOTE — PROGRESS NOTE ADULT - ASSESSMENT
47yMale being evaluated for comfort. Morphine drip @ 20mg/hr and Ativan drip @ 5mg/hr fusing and patient is comfortable at this rate. Family aware that patient is in the dying process and are mentally/emotionally prepared. Their wishes is to make patient comfortable through the transition period.       Recommendations:  Continue morphine and Ativan drip  End of life care  CMO.

## 2018-06-19 NOTE — PROGRESS NOTE ADULT - SUBJECTIVE AND OBJECTIVE BOX
47yMale with diagnosis: HYPONATREMIA;ASCITES    Patient seen appears comfortable. NAD        PHYSICAL EXAM  unchanged    T(C): , Max: 37.2 (07:10)  T(F): 99  HR: 111 (111 - 113)  BP: 103/69 (103/69 - 104/72)  RR: 20 (20 - 20)  SpO2: --                                                                                                                                                MEDICATIONS  (STANDING):  LORazepam   Injectable 4 milliGRAM(s) IV Push every 2 hours  LORazepam Infusion 0.07 mG/kG/Hr (5 mL/Hr) IV Continuous <Continuous>  morphine  Infusion 20 mG/Hr (20 mL/Hr) IV Continuous <Continuous>  nicotine - 21 mG/24Hr(s) Patch 1 patch Transdermal daily    MEDICATIONS  (PRN):  haloperidol    Injectable 5 milliGRAM(s) IV Push every 6 hours PRN agitation  morphine  - Injectable 10 milliGRAM(s) IV Push every 1 hour PRN Breakthrough pain

## 2018-06-19 NOTE — PROGRESS NOTE ADULT - SUBJECTIVE AND OBJECTIVE BOX
infectious diseases progress note:  JACINTO GAMEZ is a 47yMale patient    HYPONATREMIA;ASCITES        ROS:  CONSTITUTIONAL:  Negative fever or chills, feels well, good appetite  EYES:  Negative  blurry vision or double vision  CARDIOVASCULAR:  Negative for chest pain or palpitations  RESPIRATORY:  Negative for cough, wheezing, or SOB   GASTROINTESTINAL:  Negative for nausea, vomiting, diarrhea, constipation, or abdominal pain  GENITOURINARY:  Negative frequency, urgency or dysuria  NEUROLOGIC:  No headache, confusion, dizziness, lightheadedness    Allergies    No Known Allergies    Intolerances        ANTIBIOTICS/RELEVANT:  antimicrobials    immunologic:    OTHER:  haloperidol    Injectable 5 milliGRAM(s) IV Push every 6 hours PRN  LORazepam   Injectable 4 milliGRAM(s) IV Push every 2 hours  LORazepam Infusion 0.07 mG/kG/Hr IV Continuous <Continuous>  morphine  - Injectable 10 milliGRAM(s) IV Push every 1 hour PRN  morphine  Infusion 20 mG/Hr IV Continuous <Continuous>  nicotine - 21 mG/24Hr(s) Patch 1 patch Transdermal daily      Objective:  T(F): --  HR: 113 (06-18-18 @ 15:45) (113 - 113)  BP: 104/72 (06-18-18 @ 15:45) (104/72 - 104/72)  RR: 20 (06-18-18 @ 15:45) (20 - 20)  SpO2: --    PHYSICAL EXAM  Constitutional:Well-developed, well nourished  Eyes:ALONSO, EOMI  Ear/Nose/Throat: no oral lesion, no sinus tenderness on percussion	  Neck:no JVD, no lymphadenopathy, supple  Respiratory: CTA clarissa  Cardiovascular: S1S2 RRR, no murmurs  Gastrointestinal:soft, (+) BS, no HSM  Extremities:no e/e/c

## 2018-06-20 RX ADMIN — MORPHINE SULFATE 20 MG/HR: 50 CAPSULE, EXTENDED RELEASE ORAL at 21:25

## 2018-06-20 RX ADMIN — MORPHINE SULFATE 20 MG/HR: 50 CAPSULE, EXTENDED RELEASE ORAL at 21:55

## 2018-06-20 RX ADMIN — MORPHINE SULFATE 20 MG/HR: 50 CAPSULE, EXTENDED RELEASE ORAL at 05:01

## 2018-06-20 RX ADMIN — Medication 1 PATCH: at 12:39

## 2018-06-20 NOTE — PROGRESS NOTE ADULT - SUBJECTIVE AND OBJECTIVE BOX
JACINTO GAMEZ  47y, Male      OVERNIGHT EVENTS:    Wife at bedside.  Pt is non responsive. Agonal respirations.    VITALS:  T(F): 96.2, Max: 96.4 (06-19-18 @ 23:55)  HR: 110  BP: 104/75  RR: 22Vital Signs Last 24 Hrs  T(C): 35.7 (20 Jun 2018 07:05), Max: 35.8 (19 Jun 2018 23:55)  T(F): 96.2 (20 Jun 2018 07:05), Max: 96.4 (19 Jun 2018 23:55)  HR: 110 (20 Jun 2018 07:05) (110 - 114)  BP: 104/75 (20 Jun 2018 07:05) (104/75 - 113/76)  BP(mean): 85 (20 Jun 2018 07:05) (85 - 85)  RR: 22 (20 Jun 2018 07:05) (18 - 22)  SpO2: --    TESTS & MEASUREMENTS:                    RADIOLOGY & ADDITIONAL TESTS:    ANTIBIOTICS:

## 2018-06-20 NOTE — PROGRESS NOTE ADULT - RESPIRATORY
detailed exam
Breath Sounds equal & clear to percussion & auscultation, no accessory muscle use

## 2018-06-21 RX ORDER — ACETAMINOPHEN 500 MG
650 TABLET ORAL EVERY 6 HOURS
Qty: 0 | Refills: 0 | Status: DISCONTINUED | OUTPATIENT
Start: 2018-06-21 | End: 2018-06-22

## 2018-06-21 RX ORDER — ACETAMINOPHEN 500 MG
650 TABLET ORAL EVERY 4 HOURS
Qty: 0 | Refills: 0 | Status: DISCONTINUED | OUTPATIENT
Start: 2018-06-21 | End: 2018-06-21

## 2018-06-21 RX ADMIN — Medication 1 PATCH: at 12:59

## 2018-06-21 RX ADMIN — MORPHINE SULFATE 20 MG/HR: 50 CAPSULE, EXTENDED RELEASE ORAL at 14:40

## 2018-06-21 RX ADMIN — Medication 5 MG/KG/HR: at 09:10

## 2018-06-21 RX ADMIN — MORPHINE SULFATE 20 MG/HR: 50 CAPSULE, EXTENDED RELEASE ORAL at 03:15

## 2018-06-21 RX ADMIN — MORPHINE SULFATE 20 MG/HR: 50 CAPSULE, EXTENDED RELEASE ORAL at 21:12

## 2018-06-21 RX ADMIN — MORPHINE SULFATE 20 MG/HR: 50 CAPSULE, EXTENDED RELEASE ORAL at 09:09

## 2018-06-21 NOTE — PROGRESS NOTE ADULT - SUBJECTIVE AND OBJECTIVE BOX
JACINTO GAMEZ  47y, Male      OVERNIGHT EVENTS:    fevers, non responsive to all stimuli with agonal respirations.    VITALS:  T(F): 102.3, Max: 102.4 (06-21-18 @ 06:30)  HR: 133  BP: 109/70  RR: 20Vital Signs Last 24 Hrs  T(C): 39.1 (21 Jun 2018 07:15), Max: 39.1 (21 Jun 2018 06:30)  T(F): 102.3 (21 Jun 2018 07:15), Max: 102.4 (21 Jun 2018 06:30)  HR: 133 (21 Jun 2018 07:15) (129 - 133)  BP: 109/70 (21 Jun 2018 07:15) (106/69 - 109/70)  BP(mean): 85 (21 Jun 2018 07:15) (81 - 85)  RR: 20 (21 Jun 2018 07:15) (16 - 20)  SpO2: --    TESTS & MEASUREMENTS:                    RADIOLOGY & ADDITIONAL TESTS:    ANTIBIOTICS:

## 2018-06-21 NOTE — PROGRESS NOTE ADULT - ASSESSMENT
47yMale being evaluated for comfort. Patient on morphine drip at 20 mg/hr and ativan 5mg /hr for palliative sedation. Family at bedside requesting vital signs once a day, spouse wants an objective data for prediction. Palliative team will continue to provide emotional and supportive care.       Plan  Continue morphine and Ativan drip  End of life care  CMO. 47yMale being evaluated for comfort. Patient on morphine drip at 20 mg/hr and ativan 5mg /hr and appears comfortable. Palliative d/w family at bedside, and it was agreed upon to reduce the ativan and see how patient will respond. Spouse also requested vital signs once a day for objective data. Palliative team will continue to provide emotional and supportive care.       Plan  Decrease ativan to 3mg/hr   End of life care  CMO.

## 2018-06-21 NOTE — PROGRESS NOTE ADULT - NSHPATTENDINGPLANDISCUSS_GEN_ALL_CORE
wife at bedside and detailed that pt appears endstage with little hope of improvement
Family
Vent team and family
Family
Vent staff

## 2018-06-21 NOTE — PROGRESS NOTE ADULT - SUBJECTIVE AND OBJECTIVE BOX
47yMale with diagnosis: HYPONATREMIA;ASCITES    Patient seen, remains unresponsive.           PHYSICAL EXAM  Unchanged    T(C): , Max: 39.1 (06:30)  T(F): 102.3  HR: 133 (129 - 133)  BP: 109/70 (106/69 - 109/70)  RR: 20 (16 - 20)  SpO2: --                                                                                                                             MEDICATIONS  (STANDING):  LORazepam Infusion 0.07 mG/kG/Hr (5 mL/Hr) IV Continuous <Continuous>  morphine  Infusion 20 mG/Hr (20 mL/Hr) IV Continuous <Continuous>  nicotine - 21 mG/24Hr(s) Patch 1 patch Transdermal daily    MEDICATIONS  (PRN):  acetaminophen  Suppository 650 milliGRAM(s) Rectal every 6 hours PRN For Temp greater than 38 C (100.4 F)  haloperidol    Injectable 5 milliGRAM(s) IV Push every 6 hours PRN agitation  LORazepam   Injectable 4 milliGRAM(s) IV Push every 2 hours PRN Agitation  morphine  - Injectable 10 milliGRAM(s) IV Push every 1 hour PRN Breakthrough pain

## 2018-06-22 VITALS
TEMPERATURE: 100 F | RESPIRATION RATE: 22 BRPM | HEART RATE: 120 BPM | DIASTOLIC BLOOD PRESSURE: 68 MMHG | SYSTOLIC BLOOD PRESSURE: 97 MMHG

## 2018-06-22 RX ORDER — LACTULOSE 10 G/15ML
0 SOLUTION ORAL
Qty: 0 | Refills: 0 | COMMUNITY

## 2018-06-22 RX ORDER — ROBINUL 0.2 MG/ML
0.2 INJECTION INTRAMUSCULAR; INTRAVENOUS EVERY 6 HOURS
Qty: 0 | Refills: 0 | Status: DISCONTINUED | OUTPATIENT
Start: 2018-06-22 | End: 2018-06-22

## 2018-06-22 RX ORDER — ETRAVIRINE 200 MG/1
1 TABLET ORAL
Qty: 0 | Refills: 0 | COMMUNITY

## 2018-06-22 RX ORDER — MORPHINE SULFATE 50 MG/1
18 CAPSULE, EXTENDED RELEASE ORAL
Qty: 100 | Refills: 0 | Status: DISCONTINUED | OUTPATIENT
Start: 2018-06-22 | End: 2018-06-22

## 2018-06-22 RX ORDER — DARUNAVIR 75 MG/1
1 TABLET, FILM COATED ORAL
Qty: 0 | Refills: 0 | COMMUNITY

## 2018-06-22 RX ORDER — RALTEGRAVIR 400 MG/1
1 TABLET, FILM COATED ORAL
Qty: 0 | Refills: 0 | COMMUNITY

## 2018-06-22 RX ORDER — RITONAVIR 100 MG/1
1 TABLET, FILM COATED ORAL
Qty: 0 | Refills: 0 | COMMUNITY

## 2018-06-22 RX ADMIN — MORPHINE SULFATE 18 MG/HR: 50 CAPSULE, EXTENDED RELEASE ORAL at 13:27

## 2018-06-22 RX ADMIN — MORPHINE SULFATE 20 MG/HR: 50 CAPSULE, EXTENDED RELEASE ORAL at 07:24

## 2018-06-22 RX ADMIN — MORPHINE SULFATE 18 MG/HR: 50 CAPSULE, EXTENDED RELEASE ORAL at 15:29

## 2018-06-22 RX ADMIN — ROBINUL 0.2 MILLIGRAM(S): 0.2 INJECTION INTRAMUSCULAR; INTRAVENOUS at 12:13

## 2018-06-22 RX ADMIN — Medication 1 PATCH: at 12:12

## 2018-06-22 RX ADMIN — MORPHINE SULFATE 20 MG/HR: 50 CAPSULE, EXTENDED RELEASE ORAL at 02:08

## 2018-06-22 RX ADMIN — Medication 3 MG/KG/HR: at 00:38

## 2018-06-22 RX ADMIN — MORPHINE SULFATE 18 MG/HR: 50 CAPSULE, EXTENDED RELEASE ORAL at 18:40

## 2018-06-22 RX ADMIN — ROBINUL 0.2 MILLIGRAM(S): 0.2 INJECTION INTRAMUSCULAR; INTRAVENOUS at 18:39

## 2018-06-22 NOTE — PROGRESS NOTE ADULT - PROVIDER SPECIALTY LIST ADULT
Heme/Onc
Infectious Disease
Internal Medicine
Intervent Radiology
Intervent Radiology
Nephrology
Nutrition Support
Nutrition Support
Palliative Care
Infectious Disease
Nephrology
Palliative Care
Infectious Disease

## 2018-06-22 NOTE — PROGRESS NOTE ADULT - SUBJECTIVE AND OBJECTIVE BOX
infectious diseases progress note:  JACINTO GAMEZ is a 47yMale patient    HYPONATREMIA;ASCITES        ROS:  CONSTITUTIONAL:  Negative fever or chills, feels well, good appetite  EYES:  Negative  blurry vision or double vision  CARDIOVASCULAR:  Negative for chest pain or palpitations  RESPIRATORY:  Negative for cough, wheezing, or SOB   GASTROINTESTINAL:  Negative for nausea, vomiting, diarrhea, constipation, or abdominal pain  GENITOURINARY:  Negative frequency, urgency or dysuria  NEUROLOGIC:  No headache, confusion, dizziness, lightheadedness    Allergies    No Known Allergies    Intolerances        ANTIBIOTICS/RELEVANT:  antimicrobials    immunologic:    OTHER:  acetaminophen  Suppository 650 milliGRAM(s) Rectal every 6 hours PRN  glycopyrrolate Injectable 0.2 milliGRAM(s) IV Push every 6 hours  haloperidol    Injectable 5 milliGRAM(s) IV Push every 6 hours PRN  LORazepam   Injectable 4 milliGRAM(s) IV Push every 2 hours PRN  LORazepam Infusion 0.042 mG/kG/Hr IV Continuous <Continuous>  morphine  - Injectable 10 milliGRAM(s) IV Push every 1 hour PRN  morphine  Infusion 18 mG/Hr IV Continuous <Continuous>  nicotine - 21 mG/24Hr(s) Patch 1 patch Transdermal daily      Objective:  T(F): 99.9 (06-22-18 @ 06:39), Max: 103 (06-21-18 @ 15:30)  HR: 120 (06-22-18 @ 06:39) (120 - 138)  BP: 97/68 (06-22-18 @ 06:39) (97/68 - 99/69)  RR: 22 (06-22-18 @ 06:39) (20 - 22)  SpO2: --    PHYSICAL EXAM  Constitutional:Well-developed, well nourished  Eyes:ALONSO, EOMI  Ear/Nose/Throat: no oral lesion, no sinus tenderness on percussion	  Neck:no JVD, no lymphadenopathy, supple  Respiratory: CTA clarissa  Cardiovascular: S1S2 RRR, no murmurs  Gastrointestinal:soft, (+) BS, no HSM  Extremities:no e/e/c

## 2018-06-22 NOTE — PROGRESS NOTE ADULT - ASSESSMENT
Tmax 103    On acetaminophen  Suppository 650 milliGRAM(s) Rectal every 6 hours PRN  glycopyrrolate Injectable 0.2 milliGRAM(s) IV Push every 6 hours  haloperidol    Injectable 5 milliGRAM(s) IV Push every 6 hours PRN  LORazepam   Injectable 4 milliGRAM(s) IV Push every 2 hours PRN  LORazepam Infusion 0.042 mG/kG/Hr IV Continuous <Continuous>  morphine  - Injectable 10 milliGRAM(s) IV Push every 1 hour PRN  morphine  Infusion 18 mG/Hr IV Continuous <Continuous>  nicotine - 21 mG/24Hr(s) Patch 1 patch Transdermal daily    PLAN  Continue Comfort measures

## 2018-06-22 NOTE — PROGRESS NOTE ADULT - ASSESSMENT
47yMale being evaluated for comfort measures. Patient remains unresponsive, no acute distress noted. Patient tolerated ativa drip at 3mg/hr, will tiltrate morphine. Spouse/ son at bedside agreeable with plan.           Recommendations: 47yMale being evaluated for comfort measures. Patient remains unresponsive, no acute distress noted. Patient tolerated Ativan drip at 3mg/hr, will tiltrate  morphine drip. Family at bedside and emotional support provided.          Recommendations:  Morphine drip @ 18mg/hr  Continue ativan @ 3mg/hr  Glycopyrrolate 0.2mg iv q6hrs PRN secretion  End of life care  DNI/DNR

## 2018-06-22 NOTE — PROGRESS NOTE ADULT - SUBJECTIVE AND OBJECTIVE BOX
47yMale with diagnosis: HYPONATREMIA;ASCITES    Patient seen, lethargic. No overnight event noted.     PHYSICAL EXAM    Unchanged    T(C): , Max: 39.4 (15:30)  T(F): 99.9  HR: 120 (120 - 138)  BP: 97/68 (97/68 - 99/69)  RR: 22 (20 - 22)  SpO2: --                                                                                                                                                         MEDICATIONS  (STANDING):  LORazepam Infusion 0.042 mG/kG/Hr (3 mL/Hr) IV Continuous <Continuous>  morphine  Infusion 20 mG/Hr (20 mL/Hr) IV Continuous <Continuous>  nicotine - 21 mG/24Hr(s) Patch 1 patch Transdermal daily    MEDICATIONS  (PRN):  acetaminophen  Suppository 650 milliGRAM(s) Rectal every 6 hours PRN For Temp greater than 38 C (100.4 F)  haloperidol    Injectable 5 milliGRAM(s) IV Push every 6 hours PRN agitation  LORazepam   Injectable 4 milliGRAM(s) IV Push every 2 hours PRN Agitation  morphine  - Injectable 10 milliGRAM(s) IV Push every 1 hour PRN Breakthrough pain

## 2018-07-02 DIAGNOSIS — F11.20 OPIOID DEPENDENCE, UNCOMPLICATED: ICD-10-CM

## 2018-07-02 DIAGNOSIS — R64 CACHEXIA: ICD-10-CM

## 2018-07-02 DIAGNOSIS — R50.81 FEVER PRESENTING WITH CONDITIONS CLASSIFIED ELSEWHERE: ICD-10-CM

## 2018-07-02 DIAGNOSIS — Z51.5 ENCOUNTER FOR PALLIATIVE CARE: ICD-10-CM

## 2018-07-02 DIAGNOSIS — Z86.19 PERSONAL HISTORY OF OTHER INFECTIOUS AND PARASITIC DISEASES: ICD-10-CM

## 2018-07-02 DIAGNOSIS — F17.210 NICOTINE DEPENDENCE, CIGARETTES, UNCOMPLICATED: ICD-10-CM

## 2018-07-02 DIAGNOSIS — B20 HUMAN IMMUNODEFICIENCY VIRUS [HIV] DISEASE: ICD-10-CM

## 2018-07-02 DIAGNOSIS — E22.2 SYNDROME OF INAPPROPRIATE SECRETION OF ANTIDIURETIC HORMONE: ICD-10-CM

## 2018-07-02 DIAGNOSIS — R18.0 MALIGNANT ASCITES: ICD-10-CM

## 2018-07-02 DIAGNOSIS — Z90.89 ACQUIRED ABSENCE OF OTHER ORGANS: ICD-10-CM

## 2018-07-02 DIAGNOSIS — B18.2 CHRONIC VIRAL HEPATITIS C: ICD-10-CM

## 2018-07-02 DIAGNOSIS — G40.89 OTHER SEIZURES: ICD-10-CM

## 2018-07-02 DIAGNOSIS — K72.90 HEPATIC FAILURE, UNSPECIFIED WITHOUT COMA: ICD-10-CM

## 2018-07-02 DIAGNOSIS — K65.2 SPONTANEOUS BACTERIAL PERITONITIS: ICD-10-CM

## 2018-07-02 DIAGNOSIS — K70.31 ALCOHOLIC CIRRHOSIS OF LIVER WITH ASCITES: ICD-10-CM

## 2018-07-02 DIAGNOSIS — F10.20 ALCOHOL DEPENDENCE, UNCOMPLICATED: ICD-10-CM

## 2018-07-02 DIAGNOSIS — C78.6 SECONDARY MALIGNANT NEOPLASM OF RETROPERITONEUM AND PERITONEUM: ICD-10-CM

## 2018-07-02 DIAGNOSIS — R53.0 NEOPLASTIC (MALIGNANT) RELATED FATIGUE: ICD-10-CM

## 2018-07-02 DIAGNOSIS — C22.1 INTRAHEPATIC BILE DUCT CARCINOMA: ICD-10-CM

## 2018-07-02 DIAGNOSIS — I85.00 ESOPHAGEAL VARICES WITHOUT BLEEDING: ICD-10-CM

## 2018-07-02 DIAGNOSIS — Z66 DO NOT RESUSCITATE: ICD-10-CM

## 2018-07-02 DIAGNOSIS — R76.11 NONSPECIFIC REACTION TO TUBERCULIN SKIN TEST WITHOUT ACTIVE TUBERCULOSIS: ICD-10-CM

## 2018-07-07 LAB
CULTURE RESULTS: SIGNIFICANT CHANGE UP
SPECIMEN SOURCE: SIGNIFICANT CHANGE UP

## 2018-07-17 PROBLEM — B20 HUMAN IMMUNODEFICIENCY VIRUS [HIV] DISEASE: Chronic | Status: ACTIVE | Noted: 2018-04-10

## 2018-07-17 PROBLEM — B19.20 UNSPECIFIED VIRAL HEPATITIS C WITHOUT HEPATIC COMA: Chronic | Status: ACTIVE | Noted: 2018-04-10

## 2018-07-28 LAB
CULTURE RESULTS: SIGNIFICANT CHANGE UP
SPECIMEN SOURCE: SIGNIFICANT CHANGE UP

## 2019-06-03 PROBLEM — B20 HIV DISEASE: Status: RESOLVED | Noted: 2018-06-05 | Resolved: 2019-06-03

## 2021-10-04 NOTE — PATIENT PROFILE ADULT. - MEDICATIONS BROUGHT TO HOSPITAL, PROFILE
Vaccine Information Statement(s) was given today. This has been reviewed, questions answered, and verbal consent given by Patient for injection(s) and administration of Influenza (Inactivated).    Patient tolerated without incident. See immunization grid for documentation.   no

## 2022-06-02 NOTE — ED PROCEDURE NOTE - CPROC ED PARACENTESIS DETAIL1
The anatomic location was identified, and a needle with catheter/plastic sheath was introduced into the peritoneal cavity. Fluid was allowed to drain.
yes

## 2023-01-23 NOTE — PROGRESS NOTE ADULT - ASSESSMENT
46 yo M w/ h/o HIV on HAART therapy, liver cirrhosis/liver carcinoma, Hepatitis C infection s/p treatment, alcohol abuse, withdrawal seizures in past, h/o heroin abuse on methadone program sent in Dr Reilly from his office for therapeutic paracentesis. pt has been having progressive abdominal distension with abdominal pain for about a month. For the past few days he has been having difficulty ambulating and SOB on exertion so Dr Reilly sent him in for therapeutic para. In ED, 3 L of ascitic fluid was removed and pt was supposed to be d/luis from ED but due to low sodium on BMP, he was admitted to medicine. His problems are being managed as follows --     #SBP (PMN count > 900 in ascitic fluid) in setting of liver CA, hep C/cirrhosis  - s/p therapeutic paracentesis in ED, 3 L was removed, no tests were sent ; and therapeutic/diagnostic paracentesis w/ IR 6/7   - f/u ascitic fluid final cx and path  - c/w rocephin  - ID following appreciate recs  - GI c/s, appreciate recs  - IR consulted for repeat paracentesis today , requesting abdominal US first     #AMS, 2/2 hepatic encephalopathy vs polypharmacy (on ativan ciwa protocol and methadone)  - c/w lactulose, pt having adequate BMs this am  - c/w rifaximin    #Chronic moderate hyponatremia likely 2/2 cirrhosis vs HIV related SIADH, stable  - fluid restriction to less than 1 L per day  - nephrology c/s appreciate recs  - c/w lasix and aldactone     #HIV, last CD4 count 152 from 05/18 and HIV RNA undetectable  - c/w HAART therapy    #Metastatic hepatocellular ca s/p L lobe resection  - pt is being considered for therapy by Dr Reilly, per heme/onc fellow likely not a candidate for therapy   - OP f/u with hem/onc  - pain management c/s, recalled again today  - hospice c/s placed per family request, f/u recs    #Decreased PO intake   - c/w PPN , Dr. Hinojosa following    #Alcohol abuse  - folate, multivitamin, thiamine  - ativan prn agitation    #h/o heroin abuse on methadone   - c/w methadone 110 mg, dose verified with [facility]    #DVT ppx: lovenox    #Dispo: pending improvement of MS, resolution of SBP; from home Topical Sulfur Applications Counseling: Topical Sulfur Counseling: Patient counseled that this medication may cause skin irritation or allergic reactions.  In the event of skin irritation, the patient was advised to reduce the amount of the drug applied or use it less frequently.   The patient verbalized understanding of the proper use and possible adverse effects of topical sulfur application.  All of the patient's questions and concerns were addressed.

## 2024-04-29 NOTE — CONSULT NOTE ADULT - SUBJECTIVE AND OBJECTIVE BOX
INTERVENTIONAL RADIOLOGY CONSULT:     Procedure Requested: diagnostic/therapeutic paracentesis.     HPI:  46 yo M w/ h/o HIV on HAART therapy, liver cirrhosis/liver carcinoma, Hepatitis C infection s/p treatment, alcohol abuse, withdrawal seizures in past, h/o heroin abuse-on methadone program since 2 weeks now  sent in Dr Reilly from his office for therapeutic paracentesis.    Pt went to see Dr Reilly for his liver cancer. He has been having progressive abdominal distention for about 1 month. It is associated with abdominal pain, nausea/vomiting, sometimes fever/chills, b/l LE edema. He has been having difficulty walking around due to the belly distension so Dr Reilly thought the paracentesis would help him and sent him here. The abdominal pain is manly in upper abdomen, severe per pt, per wife it chronic but getting worse. He is going to see pain management on Friday. He has been having nausea/vomiting daily for past few months, yesterday 4 times, non-bloody. He is also having fever/chills on and off for about a month per wife. Wife also mentioned him being a little confused during day. On my assessment, pt is AAO*3.      In ED, 3 L of ascitic fluid was removed and pt was supposed to be d/luis from ED but due to low sodium on BMP, pt is being admitted.     pt denies any dizziness, numbness. (06 Jun 2018 03:34)      PAST MEDICAL & SURGICAL HISTORY:  PPD+ (purified protein derivative positive)  Hepatitis-C: s/p treatment  Nicotine dependence  HIV (human immunodeficiency virus infection): on HAART  EtOH dependence  H/O resection of liver: partial      MEDICATIONS  (STANDING):  cefTRIAXone   IVPB 2 Gram(s) IV Intermittent every 24 hours  darunavir 600 milliGRAM(s) Oral two times a day  dronabinol 10 milliGRAM(s) Oral two times a day  enoxaparin Injectable 30 milliGRAM(s) SubCutaneous daily  etravirine 200 milliGRAM(s) Oral two times a day after meals  folic acid 1 milliGRAM(s) Oral daily  furosemide    Tablet 40 milliGRAM(s) Oral daily  lactulose Syrup 20 Gram(s) Oral five times a day  LORazepam   Injectable 1.5 milliGRAM(s) IV Push every 4 hours  LORazepam   Injectable   IV Push   methadone    Tablet 110 milliGRAM(s) Oral daily  multivitamin/minerals 1 Tablet(s) Oral daily  nicotine - 21 mG/24Hr(s) Patch 1 patch Transdermal daily  raltegravir Tablet 400 milliGRAM(s) Oral two times a day  rifaximin 400 milliGRAM(s) Oral every 8 hours  ritonavir Tablet 100 milliGRAM(s) Oral two times a day  spironolactone 100 milliGRAM(s) Oral daily  thiamine 100 milliGRAM(s) Oral daily    MEDICATIONS  (PRN):  ibuprofen  Tablet 400 milliGRAM(s) Oral three times a day PRN Temp > 100.4  traMADol 50 milliGRAM(s) Oral two times a day PRN Severe Pain (7 - 10)      Allergies    No Known Allergies    Intolerances      FAMILY HISTORY:  No pertinent family history in first degree relatives      Vital Signs Last 24 Hrs  T(C): 36.4 (06 Jun 2018 22:30), Max: 38.4 (06 Jun 2018 20:46)  T(F): 97.6 (06 Jun 2018 22:30), Max: 101.1 (06 Jun 2018 20:46)  HR: 114 (07 Jun 2018 05:00) (107 - 121)  BP: 135/95 (07 Jun 2018 05:00) (110/75 - 135/95)  BP(mean): --  RR: 20 (07 Jun 2018 05:00) (18 - 20)  SpO2: 84% (07 Jun 2018 05:00) (84% - 96%)        Labs:                         11.4   8.05  )-----------( 153      ( 06 Jun 2018 11:55 )             34.9     06-06    124<L>  |  82<L>  |  8<L>  ----------------------------<  95  4.5   |  25  |  0.7    Ca    8.2<L>      06 Jun 2018 21:47  Phos  2.9     06-06  Mg     1.6     06-06    TPro  6.5  /  Alb  2.6<L>  /  TBili  1.4<H>  /  DBili  x   /  AST  371<H>  /  ALT  127<H>  /  AlkPhos  247<H>  06-06    PT/INR - ( 05 Jun 2018 20:43 )   PT: 14.80 sec;   INR: 1.36 ratio         PTT - ( 05 Jun 2018 20:43 )  PTT:26.5 sec    Pertinent labs:                      11.4   8.05  )-----------( 153      ( 06 Jun 2018 11:55 )             34.9       06-06    124<L>  |  82<L>  |  8<L>  ----------------------------<  95  4.5   |  25  |  0.7    Ca    8.2<L>      06 Jun 2018 21:47  Phos  2.9     06-06  Mg     1.6     06-06    TPro  6.5  /  Alb  2.6<L>  /  TBili  1.4<H>  /  DBili  x   /  AST  371<H>  /  ALT  127<H>  /  AlkPhos  247<H>  06-06      PT/INR - ( 05 Jun 2018 20:43 )   PT: 14.80 sec;   INR: 1.36 ratio      PTT - ( 05 Jun 2018 20:43 )  PTT:26.5 sec    Radiology & Additional Studies:     Radiology imaging reviewed.       ASSESSMENT/ PLAN:     46 yo M w/ moderate ascites. h/o HIV on HAART therapy, HCC s/p resection, Hepatitis C s/p treatment.    -CT reviewed, moderate volume ascites.   -f/u 4 quadrant US.   -Plan for paracentesis today.       Thank you for the courtesy of this consult, please call [phone] with any further questions. Detail Level: Simple Show Applicator Variable?: Yes Aperture Size (Optional): B Application Tool (Optional): Cry-AC Render Note In Bullet Format When Appropriate: No Duration Of Freeze Thaw-Cycle (Seconds): 1 Post-Care Instructions: I reviewed with the patient in detail post-care instructions. Patient is to wear sunprotection, and avoid picking at any of the treated lesions. Pt may apply Vaseline to crusted or scabbing areas. Number Of Freeze-Thaw Cycles: 1 freeze-thaw cycle Consent: The patient's consent was obtained including but not limited to risks of crusting, scabbing, blistering, scarring, darker or lighter pigmentary change, recurrence, incomplete removal and infection.